# Patient Record
Sex: MALE | Race: WHITE | NOT HISPANIC OR LATINO
[De-identification: names, ages, dates, MRNs, and addresses within clinical notes are randomized per-mention and may not be internally consistent; named-entity substitution may affect disease eponyms.]

---

## 2017-01-11 ENCOUNTER — NON-APPOINTMENT (OUTPATIENT)
Age: 62
End: 2017-01-11

## 2017-01-11 ENCOUNTER — APPOINTMENT (OUTPATIENT)
Dept: ELECTROPHYSIOLOGY | Facility: CLINIC | Age: 62
End: 2017-01-11

## 2017-01-11 VITALS
DIASTOLIC BLOOD PRESSURE: 77 MMHG | BODY MASS INDEX: 31.15 KG/M2 | HEART RATE: 79 BPM | OXYGEN SATURATION: 99 % | SYSTOLIC BLOOD PRESSURE: 128 MMHG | WEIGHT: 230 LBS | HEIGHT: 72 IN

## 2017-04-26 ENCOUNTER — APPOINTMENT (OUTPATIENT)
Dept: ELECTROPHYSIOLOGY | Facility: CLINIC | Age: 62
End: 2017-04-26

## 2017-04-26 ENCOUNTER — NON-APPOINTMENT (OUTPATIENT)
Age: 62
End: 2017-04-26

## 2017-04-26 VITALS
BODY MASS INDEX: 31.15 KG/M2 | WEIGHT: 230 LBS | DIASTOLIC BLOOD PRESSURE: 85 MMHG | HEART RATE: 91 BPM | HEIGHT: 72 IN | OXYGEN SATURATION: 97 % | SYSTOLIC BLOOD PRESSURE: 133 MMHG

## 2017-04-26 RX ORDER — INSULIN GLARGINE 100 [IU]/ML
INJECTION, SOLUTION SUBCUTANEOUS
Refills: 0 | Status: ACTIVE | COMMUNITY

## 2017-08-02 ENCOUNTER — APPOINTMENT (OUTPATIENT)
Dept: ELECTROPHYSIOLOGY | Facility: CLINIC | Age: 62
End: 2017-08-02
Payer: MEDICARE

## 2017-08-02 ENCOUNTER — NON-APPOINTMENT (OUTPATIENT)
Age: 62
End: 2017-08-02

## 2017-08-02 VITALS
BODY MASS INDEX: 31.15 KG/M2 | HEIGHT: 72 IN | WEIGHT: 230 LBS | HEART RATE: 99 BPM | SYSTOLIC BLOOD PRESSURE: 118 MMHG | OXYGEN SATURATION: 98 % | DIASTOLIC BLOOD PRESSURE: 78 MMHG

## 2017-08-02 PROCEDURE — 93280 PM DEVICE PROGR EVAL DUAL: CPT

## 2017-08-22 ENCOUNTER — EMERGENCY (EMERGENCY)
Facility: HOSPITAL | Age: 62
LOS: 1 days | Discharge: HOME | End: 2017-08-22
Attending: EMERGENCY MEDICINE | Admitting: EMERGENCY MEDICINE
Payer: MEDICARE

## 2017-08-22 VITALS
DIASTOLIC BLOOD PRESSURE: 66 MMHG | HEART RATE: 100 BPM | RESPIRATION RATE: 16 BRPM | SYSTOLIC BLOOD PRESSURE: 156 MMHG | OXYGEN SATURATION: 100 % | TEMPERATURE: 97 F

## 2017-08-22 VITALS
HEART RATE: 85 BPM | RESPIRATION RATE: 16 BRPM | OXYGEN SATURATION: 99 % | SYSTOLIC BLOOD PRESSURE: 124 MMHG | DIASTOLIC BLOOD PRESSURE: 62 MMHG

## 2017-08-22 LAB
ALBUMIN SERPL ELPH-MCNC: 4.1 G/DL — SIGNIFICANT CHANGE UP (ref 3.3–5)
ALP SERPL-CCNC: 80 U/L — SIGNIFICANT CHANGE UP (ref 40–120)
ALT FLD-CCNC: 23 U/L — SIGNIFICANT CHANGE UP (ref 4–41)
APTT BLD: 31.4 SEC — SIGNIFICANT CHANGE UP (ref 27.5–37.4)
AST SERPL-CCNC: 30 U/L — SIGNIFICANT CHANGE UP (ref 4–40)
BASE EXCESS BLDV CALC-SCNC: -0.9 MMOL/L — SIGNIFICANT CHANGE UP
BASOPHILS # BLD AUTO: 0.03 K/UL — SIGNIFICANT CHANGE UP (ref 0–0.2)
BASOPHILS NFR BLD AUTO: 0.3 % — SIGNIFICANT CHANGE UP (ref 0–2)
BILIRUB SERPL-MCNC: 0.2 MG/DL — SIGNIFICANT CHANGE UP (ref 0.2–1.2)
BLOOD GAS VENOUS - CREATININE: 0.96 MG/DL — SIGNIFICANT CHANGE UP (ref 0.5–1.3)
BUN SERPL-MCNC: 21 MG/DL — SIGNIFICANT CHANGE UP (ref 7–23)
CALCIUM SERPL-MCNC: 8.7 MG/DL — SIGNIFICANT CHANGE UP (ref 8.4–10.5)
CHLORIDE BLDV-SCNC: 105 MMOL/L — SIGNIFICANT CHANGE UP (ref 96–108)
CHLORIDE SERPL-SCNC: 102 MMOL/L — SIGNIFICANT CHANGE UP (ref 98–107)
CK MB BLD-MCNC: 1.98 NG/ML — SIGNIFICANT CHANGE UP (ref 1–6.6)
CK MB BLD-MCNC: 2.24 NG/ML — SIGNIFICANT CHANGE UP (ref 1–6.6)
CK SERPL-CCNC: 61 U/L — SIGNIFICANT CHANGE UP (ref 30–200)
CK SERPL-CCNC: 68 U/L — SIGNIFICANT CHANGE UP (ref 30–200)
CO2 SERPL-SCNC: 23 MMOL/L — SIGNIFICANT CHANGE UP (ref 22–31)
CREAT SERPL-MCNC: 1.09 MG/DL — SIGNIFICANT CHANGE UP (ref 0.5–1.3)
EOSINOPHIL # BLD AUTO: 0.18 K/UL — SIGNIFICANT CHANGE UP (ref 0–0.5)
EOSINOPHIL NFR BLD AUTO: 1.6 % — SIGNIFICANT CHANGE UP (ref 0–6)
GAS PNL BLDV: 137 MMOL/L — SIGNIFICANT CHANGE UP (ref 136–146)
GLUCOSE BLDV-MCNC: 264 — HIGH (ref 70–99)
GLUCOSE SERPL-MCNC: 248 MG/DL — HIGH (ref 70–99)
HCO3 BLDV-SCNC: 23 MMOL/L — SIGNIFICANT CHANGE UP (ref 20–27)
HCT VFR BLD CALC: 36.2 % — LOW (ref 39–50)
HCT VFR BLDV CALC: 37 % — LOW (ref 39–51)
HGB BLD-MCNC: 11.9 G/DL — LOW (ref 13–17)
HGB BLDV-MCNC: 12 G/DL — LOW (ref 13–17)
IMM GRANULOCYTES # BLD AUTO: 0.1 # — SIGNIFICANT CHANGE UP
IMM GRANULOCYTES NFR BLD AUTO: 0.9 % — SIGNIFICANT CHANGE UP (ref 0–1.5)
INR BLD: 1 — SIGNIFICANT CHANGE UP (ref 0.88–1.17)
LACTATE BLDV-MCNC: 2.9 MMOL/L — HIGH (ref 0.5–2)
LYMPHOCYTES # BLD AUTO: 1.1 K/UL — SIGNIFICANT CHANGE UP (ref 1–3.3)
LYMPHOCYTES # BLD AUTO: 10 % — LOW (ref 13–44)
MCHC RBC-ENTMCNC: 28.4 PG — SIGNIFICANT CHANGE UP (ref 27–34)
MCHC RBC-ENTMCNC: 32.9 % — SIGNIFICANT CHANGE UP (ref 32–36)
MCV RBC AUTO: 86.4 FL — SIGNIFICANT CHANGE UP (ref 80–100)
MONOCYTES # BLD AUTO: 0.86 K/UL — SIGNIFICANT CHANGE UP (ref 0–0.9)
MONOCYTES NFR BLD AUTO: 7.8 % — SIGNIFICANT CHANGE UP (ref 2–14)
NEUTROPHILS # BLD AUTO: 8.69 K/UL — HIGH (ref 1.8–7.4)
NEUTROPHILS NFR BLD AUTO: 79.4 % — HIGH (ref 43–77)
NRBC # FLD: 0 — SIGNIFICANT CHANGE UP
PCO2 BLDV: 42 MMHG — SIGNIFICANT CHANGE UP (ref 41–51)
PH BLDV: 7.37 PH — SIGNIFICANT CHANGE UP (ref 7.32–7.43)
PLATELET # BLD AUTO: 255 K/UL — SIGNIFICANT CHANGE UP (ref 150–400)
PMV BLD: 10.5 FL — SIGNIFICANT CHANGE UP (ref 7–13)
PO2 BLDV: 44 MMHG — HIGH (ref 35–40)
POTASSIUM BLDV-SCNC: 4.5 MMOL/L — SIGNIFICANT CHANGE UP (ref 3.4–4.5)
POTASSIUM SERPL-MCNC: 4.8 MMOL/L — SIGNIFICANT CHANGE UP (ref 3.5–5.3)
POTASSIUM SERPL-SCNC: 4.8 MMOL/L — SIGNIFICANT CHANGE UP (ref 3.5–5.3)
PROT SERPL-MCNC: 7.5 G/DL — SIGNIFICANT CHANGE UP (ref 6–8.3)
PROTHROM AB SERPL-ACNC: 11.2 SEC — SIGNIFICANT CHANGE UP (ref 9.8–13.1)
RBC # BLD: 4.19 M/UL — LOW (ref 4.2–5.8)
RBC # FLD: 13.2 % — SIGNIFICANT CHANGE UP (ref 10.3–14.5)
SAO2 % BLDV: 75.6 % — SIGNIFICANT CHANGE UP (ref 60–85)
SODIUM SERPL-SCNC: 139 MMOL/L — SIGNIFICANT CHANGE UP (ref 135–145)
TROPONIN T SERPL-MCNC: < 0.06 NG/ML — SIGNIFICANT CHANGE UP (ref 0–0.06)
TROPONIN T SERPL-MCNC: < 0.06 NG/ML — SIGNIFICANT CHANGE UP (ref 0–0.06)
WBC # BLD: 10.96 K/UL — HIGH (ref 3.8–10.5)
WBC # FLD AUTO: 10.96 K/UL — HIGH (ref 3.8–10.5)

## 2017-08-22 PROCEDURE — 93010 ELECTROCARDIOGRAM REPORT: CPT | Mod: 76

## 2017-08-22 PROCEDURE — 71020: CPT | Mod: 26

## 2017-08-22 PROCEDURE — 99284 EMERGENCY DEPT VISIT MOD MDM: CPT | Mod: 25,GC

## 2017-08-22 RX ORDER — IPRATROPIUM/ALBUTEROL SULFATE 18-103MCG
3 AEROSOL WITH ADAPTER (GRAM) INHALATION ONCE
Qty: 0 | Refills: 0 | Status: COMPLETED | OUTPATIENT
Start: 2017-08-22 | End: 2017-08-22

## 2017-08-22 RX ADMIN — Medication 3 MILLILITER(S): at 09:05

## 2017-08-22 NOTE — ED PROVIDER NOTE - MEDICAL DECISION MAKING DETAILS
61 yo presenting with difficulty breathing that woke him up caused by mucous congestion. , afebrile, no respiratory distress on exam, some baseline anxiety. CXR, cardiac enzymes, and EKG pending. Will reassess.

## 2017-08-22 NOTE — ED PROVIDER NOTE - PMH
Arrhythmia as indication for cardiac pacemaker replacement    Depression    Diabetes mellitus type 2 in obese    Diabetes mellitus type II, controlled, with no complications    Dyslipidemia    HLD (hyperlipidemia)    HTN (hypertension)    HTN (hypertension)

## 2017-08-22 NOTE — ED PROVIDER NOTE - CARE PLAN
Instructions for follow-up, activity and diet:	1. You were seen for shortness of breath caused my mucous. Your chest X-ray showed no signs of pneumonia. Your EKG and cardiac markers were normal.  2. Take over the counter mucinex for your cough as prescribed. Try an over the counter Neti Pot for your congestion. Drink plenty of fluids and rest.   3. Follow up with your primary care doctor in 1-2 days for further workup of your congestion and cough.  4. Return to the emergency department for any shortness of breath, wheezing, chest pain, fever, bleeding, or fainting. Principal Discharge DX:	Bronchitis  Instructions for follow-up, activity and diet:	1. You were seen for shortness of breath caused my mucous. Your chest X-ray showed no signs of pneumonia. Your EKG and cardiac markers were normal.  2. Take over the counter mucinex for your cough as prescribed. Try an over the counter Neti Pot for your congestion. Drink plenty of fluids and rest.   3. Follow up with your primary care doctor in 1-2 days for further workup of your congestion and cough.  4. Return to the emergency department for any shortness of breath, wheezing, chest pain, fever, bleeding, or fainting.

## 2017-08-22 NOTE — ED PROVIDER NOTE - PROGRESS NOTE DETAILS
62 YOM PM arrhythmia NOS, pacemaker, former smoker, HTN, HLD, followed by cardiologist Dr. Champion p/w sudden onset SOB last night in the context of productive cough for 4 days.  Associated chest tightness.  Sx resolved with cough in two minutes.  In the ED Pt denies CP.  In the ED HR high 90's.  VS otherwise WNL.  Physical Exam: General: alert and oriented x3 in no acute distress.  Cardiovascular: Regular rate and rhythm, S1 and S2 normal.  No murmurs, rubs, or gallops.  No peripheral edema or JVD.  Respiratory: Bilateral rhonchi, mild wheezing diffusely.  Abdominal: Non-distended, normal bowel sounds in all four quadrants, non tender to percussion and palpation in all four quadrants.  No mass, rigidity, or guarding.  Extremities: No sign of trauma, inflammation, or effusion.  Full range of motion in the cervical, lumbar, and thoracic spine and all four extremities without limitation.  No peripheral edema.  Neurological: AOx3 NAD.  Cranial nerves I-XII intact.  Normal gross motor and sensory function. Pt ambulatory. 5/5 muscular strength with normal bulk and tone.  DDX: Bronchitis, PNA, IPF, primary pulmonary process.  Given Hx R/O ACS/Arrhythmia.  Treat symptomatically for SOB.  Reassess. Spoke with PCP on phone. PCP faxed pt's medical records to [facility]:  1. stress test 4/28/16: no evidence of stress-induced myocardial ischemia, LVEF: 30%  2. EKG on 3/10/16: ventricularly paced EKG CXR, and cardiac enzymes and EKG x 2 are normal. Pt breathing comfortably. Will discharge pt with instructions for supportive care for bronchitis and have him follow up with his PCP for further workup for persistent cough.

## 2017-08-22 NOTE — ED ADULT NURSE NOTE - OBJECTIVE STATEMENT
Patient received in room 8. Pt. is A&O x3 and ambulatory at baseline. Pt. c/o congestion and productive cough that began "about a month ago." Pt. states he woke up and had difficulty breathing, drank some water which helped. Pt. states the mucous was brown and clear. Respirations are even and unlabored on room air. VS as noted. MD Remy at bedside. Will continue to monitor.

## 2017-08-22 NOTE — ED ADULT TRIAGE NOTE - CHIEF COMPLAINT QUOTE
Pt. has been congested x 1 month.  this morning he felt as though he could not expel mucus and had some difficulty breathing. noted with some expiratory wheezes.

## 2017-08-22 NOTE — ED PROVIDER NOTE - OBJECTIVE STATEMENT
63 yo M presenting with difficulty breathing 2/2 congestion last night. Pt had pharyngitis for 1 month that resolved 1 week ago, a non-productive cough that started 3 weeks ago. A few nights ago pt started having congestion and last night pt had mucous building up that woke pt up with difficulty breathing. Mucous was white and brown and non-purulent. Pt produced 1/2 cup mucous last night. Pt drank water which helped decrease the mucous. No hx of sx.     PCP: Alesha Ng  Pharm: [id number] 61 yo M presenting with difficulty breathing 2/2 congestion last night. Pt had pharyngitis for 1 month that resolved 1 week ago, a non-productive cough that started 3 weeks ago. A few nights ago pt started having congestion and last night pt had mucous building up that woke pt up with difficulty breathing. Mucous was white and brown and non-purulent. Pt produced 1/2 cup mucous last night. Pt drank water which helped decrease the mucous. No hx of sx.     Meds: parotetine, lisinopril, gemfibrozil, januvia, metformin, glimepiride, asa 81    PCP: Alesha Ng  Pharm: [id number] [address]

## 2017-08-22 NOTE — ED PROVIDER NOTE - ATTENDING CONTRIBUTION TO CARE
ED Attending Dr. Robb: 61 yo male with PPM for unclear reasons, former smoker, in ED with 3 weeks of nonproductive cough, now with mucus production since last night.  Associated with vague intermittent chest pressure.  Pt feels improved after coughing up mucus.  At time of my exam there is no CP/SOB, N/V/D or abdominal pain.  On exam pt well appearing, in NAD, heart RRR, lungs scant wheezing and rhonchi throughout, abd NTND, extremities without swelling, strength 5/5 in all extremities and skin without rash.

## 2017-08-22 NOTE — ED PROVIDER NOTE - PLAN OF CARE
1. You were seen for shortness of breath caused my mucous. Your chest X-ray showed no signs of pneumonia. Your EKG and cardiac markers were normal.  2. Take over the counter mucinex for your cough as prescribed. Try an over the counter Neti Pot for your congestion. Drink plenty of fluids and rest.   3. Follow up with your primary care doctor in 1-2 days for further workup of your congestion and cough.  4. Return to the emergency department for any shortness of breath, wheezing, chest pain, fever, bleeding, or fainting.

## 2017-08-22 NOTE — ED PROVIDER NOTE - ENMT, MLM
Airway patent, Nasal mucosa clear. Mouth with normal mucosa. Throat has no vesicles, no oropharyngeal exudates

## 2017-12-06 ENCOUNTER — NON-APPOINTMENT (OUTPATIENT)
Age: 62
End: 2017-12-06

## 2017-12-06 ENCOUNTER — APPOINTMENT (OUTPATIENT)
Dept: ELECTROPHYSIOLOGY | Facility: CLINIC | Age: 62
End: 2017-12-06
Payer: MEDICARE

## 2017-12-06 VITALS — SYSTOLIC BLOOD PRESSURE: 119 MMHG | HEART RATE: 94 BPM | DIASTOLIC BLOOD PRESSURE: 74 MMHG | OXYGEN SATURATION: 95 %

## 2017-12-06 PROCEDURE — 93280 PM DEVICE PROGR EVAL DUAL: CPT

## 2018-01-04 ENCOUNTER — OTHER (OUTPATIENT)
Age: 63
End: 2018-01-04

## 2018-01-04 DIAGNOSIS — I47.2 VENTRICULAR TACHYCARDIA: ICD-10-CM

## 2018-01-23 ENCOUNTER — APPOINTMENT (OUTPATIENT)
Dept: CV DIAGNOSTICS | Facility: HOSPITAL | Age: 63
End: 2018-01-23

## 2018-02-08 ENCOUNTER — APPOINTMENT (OUTPATIENT)
Dept: ELECTROPHYSIOLOGY | Facility: CLINIC | Age: 63
End: 2018-02-08

## 2018-04-04 ENCOUNTER — APPOINTMENT (OUTPATIENT)
Dept: ELECTROPHYSIOLOGY | Facility: CLINIC | Age: 63
End: 2018-04-04
Payer: MEDICARE

## 2018-04-04 VITALS — HEART RATE: 71 BPM | SYSTOLIC BLOOD PRESSURE: 120 MMHG | DIASTOLIC BLOOD PRESSURE: 71 MMHG

## 2018-04-04 PROCEDURE — 93280 PM DEVICE PROGR EVAL DUAL: CPT

## 2018-04-04 RX ORDER — ASPIRIN 81 MG/1
81 TABLET ORAL
Refills: 0 | Status: ACTIVE | COMMUNITY

## 2018-08-07 ENCOUNTER — APPOINTMENT (OUTPATIENT)
Dept: ELECTROPHYSIOLOGY | Facility: CLINIC | Age: 63
End: 2018-08-07

## 2018-08-17 PROBLEM — I49.9 CARDIAC ARRHYTHMIA, UNSPECIFIED: Chronic | Status: ACTIVE | Noted: 2017-08-22

## 2018-08-21 ENCOUNTER — APPOINTMENT (OUTPATIENT)
Dept: ELECTROPHYSIOLOGY | Facility: CLINIC | Age: 63
End: 2018-08-21
Payer: MEDICARE

## 2018-08-21 VITALS
HEIGHT: 72 IN | WEIGHT: 230 LBS | HEART RATE: 99 BPM | DIASTOLIC BLOOD PRESSURE: 84 MMHG | BODY MASS INDEX: 31.15 KG/M2 | OXYGEN SATURATION: 97 % | SYSTOLIC BLOOD PRESSURE: 128 MMHG

## 2018-08-21 PROCEDURE — 93280 PM DEVICE PROGR EVAL DUAL: CPT

## 2018-11-13 ENCOUNTER — NON-APPOINTMENT (OUTPATIENT)
Age: 63
End: 2018-11-13

## 2018-11-13 ENCOUNTER — APPOINTMENT (OUTPATIENT)
Dept: ELECTROPHYSIOLOGY | Facility: CLINIC | Age: 63
End: 2018-11-13
Payer: MEDICARE

## 2018-11-13 VITALS
OXYGEN SATURATION: 97 % | HEART RATE: 60 BPM | WEIGHT: 230 LBS | DIASTOLIC BLOOD PRESSURE: 82 MMHG | HEIGHT: 72 IN | BODY MASS INDEX: 31.15 KG/M2 | SYSTOLIC BLOOD PRESSURE: 156 MMHG

## 2018-11-13 PROCEDURE — 93280 PM DEVICE PROGR EVAL DUAL: CPT

## 2018-11-13 PROCEDURE — 99214 OFFICE O/P EST MOD 30 MIN: CPT

## 2018-11-13 NOTE — DISCUSSION/SUMMARY
[FreeTextEntry1] : Normal device function with adequate safety margins.  Since his mode switch (9/21/2018) he has been 99%  at 60 bpm.  Given his symptoms of shortness of breath I suggested that we add rate response.  We also discussed that persistent atrial arrhythmia can cause these symptoms and that his ECG is c/w typical CTI dependant flutter, a rhythm amenable to ablation with a low risk and high likelihood for success.  Of course this would not address his PAF and thus he should/would remain on oral AC.  I stressed the importance of this and counseled that if finances are a concern he should consider Coumadin.  He will d/w his PMD.  He declines EPS/Flutter ablation, again out of concern for cost.  He will follow up in 2 months.

## 2018-11-13 NOTE — HISTORY OF PRESENT ILLNESS
[de-identified] : He is back on Eliquis.  He is concerned about long term therapy given the cost. No palpitations. No chest pain. He has noted some shortness of breath on exertion over the past few months.  It is not severe.  No lightheadedness/dizziness.

## 2018-11-13 NOTE — PHYSICAL EXAM
[General Appearance - Well Developed] : well developed [Normal Appearance] : normal appearance [Well Groomed] : well groomed [General Appearance - Well Nourished] : well nourished [No Deformities] : no deformities [General Appearance - In No Acute Distress] : no acute distress [Heart Rate And Rhythm] : heart rate and rhythm were normal [Heart Sounds] : normal S1 and S2 [Systolic grade ___/6] : A grade [unfilled]/6 systolic murmur was heard. [Respiration, Rhythm And Depth] : normal respiratory rhythm and effort [Exaggerated Use Of Accessory Muscles For Inspiration] : no accessory muscle use [Auscultation Breath Sounds / Voice Sounds] : lungs were clear to auscultation bilaterally [Left Infraclavicular] : left infraclavicular area [Clean] : clean [Dry] : dry [Well-Healed] : well-healed [Abdomen Soft] : soft [Abdomen Tenderness] : non-tender [Abdomen Mass (___ Cm)] : no abdominal mass palpated [Nail Clubbing] : no clubbing of the fingernails [Cyanosis, Localized] : no localized cyanosis [Petechial Hemorrhages (___cm)] : no petechial hemorrhages [] : no ischemic changes

## 2018-11-13 NOTE — PROCEDURE
[Pacemaker] : pacemaker [Threshold Testing Performed] : Threshold testing was performed [Lead Imp:  ___ohms] : lead impedance was [unfilled] ohms [Sensing Amplitude ___mv] : sensing amplitude was [unfilled] mv [___V @] : [unfilled] V [___ ms] : [unfilled] ms [de-identified] : [employer]  [de-identified] : [employer] [de-identified] : [id number] [de-identified] : 2/28/2013 [de-identified] : Persistent atrial arrhythmia since 9/21/2018

## 2019-03-05 ENCOUNTER — NON-APPOINTMENT (OUTPATIENT)
Age: 64
End: 2019-03-05

## 2019-03-05 ENCOUNTER — APPOINTMENT (OUTPATIENT)
Dept: ELECTROPHYSIOLOGY | Facility: CLINIC | Age: 64
End: 2019-03-05
Payer: MEDICARE

## 2019-03-05 VITALS
OXYGEN SATURATION: 97 % | DIASTOLIC BLOOD PRESSURE: 74 MMHG | WEIGHT: 230 LBS | SYSTOLIC BLOOD PRESSURE: 124 MMHG | HEIGHT: 72 IN | BODY MASS INDEX: 31.15 KG/M2 | HEART RATE: 63 BPM

## 2019-03-05 DIAGNOSIS — I48.92 UNSPECIFIED ATRIAL FLUTTER: ICD-10-CM

## 2019-03-05 PROCEDURE — 99213 OFFICE O/P EST LOW 20 MIN: CPT

## 2019-03-05 PROCEDURE — 93280 PM DEVICE PROGR EVAL DUAL: CPT

## 2019-03-05 RX ORDER — APIXABAN 5 MG/1
5 TABLET, FILM COATED ORAL
Qty: 60 | Refills: 5 | Status: ACTIVE | COMMUNITY
Start: 2018-11-13

## 2019-03-05 NOTE — DISCUSSION/SUMMARY
[FreeTextEntry1] : Normal device function with adequate safety margins.  Remains in persistent atrial flutter.  Will not try manual ATP as he is only taking the Eliquis 2x/day.  He was advised of the importance of this intervention.  We again discussed ablation.  He expresses concern over the cost of his healthcare, meds and procedures.  Follow up in 4 months.

## 2019-03-05 NOTE — PROCEDURE
[Pacemaker] : pacemaker [Threshold Testing Performed] : Threshold testing was performed [Lead Imp:  ___ohms] : lead impedance was [unfilled] ohms [Sensing Amplitude ___mv] : sensing amplitude was [unfilled] mv [___V @] : [unfilled] V [___ ms] : [unfilled] ms [de-identified] : [employer]  [de-identified] : [employer] [de-identified] : [id number] [de-identified] : 2/28/2013 [de-identified] : Persistent atrial arrhythmia since 9/21/2018

## 2019-03-05 NOTE — HISTORY OF PRESENT ILLNESS
[de-identified] : He has noted better effort tolerance since changing adding rate response.  No palpitations. NO chest pain. No shortness of breath.

## 2019-04-20 ENCOUNTER — INPATIENT (INPATIENT)
Facility: HOSPITAL | Age: 64
LOS: 3 days | Discharge: HOME | DRG: 378 | End: 2019-04-24
Attending: STUDENT IN AN ORGANIZED HEALTH CARE EDUCATION/TRAINING PROGRAM | Admitting: STUDENT IN AN ORGANIZED HEALTH CARE EDUCATION/TRAINING PROGRAM
Payer: MEDICARE

## 2019-04-20 VITALS
TEMPERATURE: 98 F | RESPIRATION RATE: 18 BRPM | OXYGEN SATURATION: 98 % | SYSTOLIC BLOOD PRESSURE: 105 MMHG | HEART RATE: 60 BPM | DIASTOLIC BLOOD PRESSURE: 67 MMHG | WEIGHT: 229.06 LBS

## 2019-04-20 DIAGNOSIS — K92.2 GASTROINTESTINAL HEMORRHAGE, UNSPECIFIED: ICD-10-CM

## 2019-04-20 DIAGNOSIS — E11.9 TYPE 2 DIABETES MELLITUS WITHOUT COMPLICATIONS: ICD-10-CM

## 2019-04-20 DIAGNOSIS — D50.0 IRON DEFICIENCY ANEMIA SECONDARY TO BLOOD LOSS (CHRONIC): ICD-10-CM

## 2019-04-20 DIAGNOSIS — F41.9 ANXIETY DISORDER, UNSPECIFIED: ICD-10-CM

## 2019-04-20 DIAGNOSIS — Z29.9 ENCOUNTER FOR PROPHYLACTIC MEASURES, UNSPECIFIED: ICD-10-CM

## 2019-04-20 DIAGNOSIS — I10 ESSENTIAL (PRIMARY) HYPERTENSION: ICD-10-CM

## 2019-04-20 LAB
ALBUMIN SERPL ELPH-MCNC: 4.4 G/DL — SIGNIFICANT CHANGE UP (ref 3.3–5)
ALP SERPL-CCNC: 96 U/L — SIGNIFICANT CHANGE UP (ref 40–120)
ALT FLD-CCNC: 15 U/L — SIGNIFICANT CHANGE UP (ref 10–45)
ANION GAP SERPL CALC-SCNC: 17 MMOL/L — SIGNIFICANT CHANGE UP (ref 5–17)
APPEARANCE UR: CLEAR — SIGNIFICANT CHANGE UP
APTT BLD: 30.8 SEC — SIGNIFICANT CHANGE UP (ref 27.5–36.3)
AST SERPL-CCNC: 21 U/L — SIGNIFICANT CHANGE UP (ref 10–40)
BASOPHILS # BLD AUTO: 0.1 K/UL — SIGNIFICANT CHANGE UP (ref 0–0.2)
BASOPHILS NFR BLD AUTO: 0.4 % — SIGNIFICANT CHANGE UP (ref 0–2)
BILIRUB SERPL-MCNC: 0.2 MG/DL — SIGNIFICANT CHANGE UP (ref 0.2–1.2)
BILIRUB UR-MCNC: NEGATIVE — SIGNIFICANT CHANGE UP
BLD GP AB SCN SERPL QL: NEGATIVE — SIGNIFICANT CHANGE UP
BUN SERPL-MCNC: 43 MG/DL — HIGH (ref 7–23)
CALCIUM SERPL-MCNC: 9.5 MG/DL — SIGNIFICANT CHANGE UP (ref 8.4–10.5)
CHLORIDE SERPL-SCNC: 100 MMOL/L — SIGNIFICANT CHANGE UP (ref 96–108)
CO2 SERPL-SCNC: 20 MMOL/L — LOW (ref 22–31)
COLOR SPEC: SIGNIFICANT CHANGE UP
CREAT SERPL-MCNC: 1.35 MG/DL — HIGH (ref 0.5–1.3)
DIFF PNL FLD: NEGATIVE — SIGNIFICANT CHANGE UP
EOSINOPHIL # BLD AUTO: 0.1 K/UL — SIGNIFICANT CHANGE UP (ref 0–0.5)
EOSINOPHIL NFR BLD AUTO: 1 % — SIGNIFICANT CHANGE UP (ref 0–6)
GAS PNL BLDV: SIGNIFICANT CHANGE UP
GLUCOSE SERPL-MCNC: 254 MG/DL — HIGH (ref 70–99)
GLUCOSE UR QL: ABNORMAL
HCT VFR BLD CALC: 27.3 % — LOW (ref 39–50)
HGB BLD-MCNC: 9 G/DL — LOW (ref 13–17)
INR BLD: 1.24 RATIO — HIGH (ref 0.88–1.16)
KETONES UR-MCNC: NEGATIVE — SIGNIFICANT CHANGE UP
LEUKOCYTE ESTERASE UR-ACNC: NEGATIVE — SIGNIFICANT CHANGE UP
LYMPHOCYTES # BLD AUTO: 13.8 % — SIGNIFICANT CHANGE UP (ref 13–44)
LYMPHOCYTES # BLD AUTO: 2 K/UL — SIGNIFICANT CHANGE UP (ref 1–3.3)
MCHC RBC-ENTMCNC: 27.5 PG — SIGNIFICANT CHANGE UP (ref 27–34)
MCHC RBC-ENTMCNC: 32.9 GM/DL — SIGNIFICANT CHANGE UP (ref 32–36)
MCV RBC AUTO: 83.5 FL — SIGNIFICANT CHANGE UP (ref 80–100)
MONOCYTES # BLD AUTO: 1.3 K/UL — HIGH (ref 0–0.9)
MONOCYTES NFR BLD AUTO: 8.8 % — SIGNIFICANT CHANGE UP (ref 2–14)
NEUTROPHILS # BLD AUTO: 10.9 K/UL — HIGH (ref 1.8–7.4)
NEUTROPHILS NFR BLD AUTO: 75.9 % — SIGNIFICANT CHANGE UP (ref 43–77)
NITRITE UR-MCNC: NEGATIVE — SIGNIFICANT CHANGE UP
OB PNL STL: POSITIVE
PH UR: 6 — SIGNIFICANT CHANGE UP (ref 5–8)
PLATELET # BLD AUTO: 334 K/UL — SIGNIFICANT CHANGE UP (ref 150–400)
POTASSIUM SERPL-MCNC: 4.2 MMOL/L — SIGNIFICANT CHANGE UP (ref 3.5–5.3)
POTASSIUM SERPL-SCNC: 4.2 MMOL/L — SIGNIFICANT CHANGE UP (ref 3.5–5.3)
PROT SERPL-MCNC: 7.8 G/DL — SIGNIFICANT CHANGE UP (ref 6–8.3)
PROT UR-MCNC: NEGATIVE — SIGNIFICANT CHANGE UP
PROTHROM AB SERPL-ACNC: 14.3 SEC — HIGH (ref 10–12.9)
RBC # BLD: 3.27 M/UL — LOW (ref 4.2–5.8)
RBC # FLD: 13.2 % — SIGNIFICANT CHANGE UP (ref 10.3–14.5)
RH IG SCN BLD-IMP: POSITIVE — SIGNIFICANT CHANGE UP
SODIUM SERPL-SCNC: 137 MMOL/L — SIGNIFICANT CHANGE UP (ref 135–145)
SP GR SPEC: 1.02 — SIGNIFICANT CHANGE UP (ref 1.01–1.02)
UROBILINOGEN FLD QL: NEGATIVE — SIGNIFICANT CHANGE UP
WBC # BLD: 14.4 K/UL — HIGH (ref 3.8–10.5)
WBC # FLD AUTO: 14.4 K/UL — HIGH (ref 3.8–10.5)

## 2019-04-20 PROCEDURE — 99285 EMERGENCY DEPT VISIT HI MDM: CPT | Mod: GC

## 2019-04-20 PROCEDURE — 99223 1ST HOSP IP/OBS HIGH 75: CPT | Mod: GC

## 2019-04-20 RX ORDER — DEXTROSE 50 % IN WATER 50 %
25 SYRINGE (ML) INTRAVENOUS ONCE
Qty: 0 | Refills: 0 | Status: DISCONTINUED | OUTPATIENT
Start: 2019-04-20 | End: 2019-04-24

## 2019-04-20 RX ORDER — GLUCAGON INJECTION, SOLUTION 0.5 MG/.1ML
1 INJECTION, SOLUTION SUBCUTANEOUS ONCE
Qty: 0 | Refills: 0 | Status: DISCONTINUED | OUTPATIENT
Start: 2019-04-20 | End: 2019-04-24

## 2019-04-20 RX ORDER — DEXTROSE 50 % IN WATER 50 %
12.5 SYRINGE (ML) INTRAVENOUS ONCE
Qty: 0 | Refills: 0 | Status: DISCONTINUED | OUTPATIENT
Start: 2019-04-20 | End: 2019-04-24

## 2019-04-20 RX ORDER — PANTOPRAZOLE SODIUM 20 MG/1
40 TABLET, DELAYED RELEASE ORAL ONCE
Qty: 0 | Refills: 0 | Status: COMPLETED | OUTPATIENT
Start: 2019-04-20 | End: 2019-04-20

## 2019-04-20 RX ORDER — PANTOPRAZOLE SODIUM 20 MG/1
40 TABLET, DELAYED RELEASE ORAL EVERY 12 HOURS
Qty: 0 | Refills: 0 | Status: DISCONTINUED | OUTPATIENT
Start: 2019-04-20 | End: 2019-04-23

## 2019-04-20 RX ORDER — SODIUM CHLORIDE 9 MG/ML
1000 INJECTION, SOLUTION INTRAVENOUS ONCE
Qty: 0 | Refills: 0 | Status: COMPLETED | OUTPATIENT
Start: 2019-04-20 | End: 2019-04-20

## 2019-04-20 RX ORDER — INFLUENZA VIRUS VACCINE 15; 15; 15; 15 UG/.5ML; UG/.5ML; UG/.5ML; UG/.5ML
0.5 SUSPENSION INTRAMUSCULAR ONCE
Qty: 0 | Refills: 0 | Status: DISCONTINUED | OUTPATIENT
Start: 2019-04-20 | End: 2019-04-24

## 2019-04-20 RX ORDER — INSULIN LISPRO 100/ML
VIAL (ML) SUBCUTANEOUS AT BEDTIME
Qty: 0 | Refills: 0 | Status: DISCONTINUED | OUTPATIENT
Start: 2019-04-20 | End: 2019-04-24

## 2019-04-20 RX ORDER — INSULIN LISPRO 100/ML
VIAL (ML) SUBCUTANEOUS
Qty: 0 | Refills: 0 | Status: DISCONTINUED | OUTPATIENT
Start: 2019-04-20 | End: 2019-04-24

## 2019-04-20 RX ORDER — SODIUM CHLORIDE 9 MG/ML
1000 INJECTION, SOLUTION INTRAVENOUS
Qty: 0 | Refills: 0 | Status: DISCONTINUED | OUTPATIENT
Start: 2019-04-20 | End: 2019-04-24

## 2019-04-20 RX ORDER — DEXTROSE 50 % IN WATER 50 %
15 SYRINGE (ML) INTRAVENOUS ONCE
Qty: 0 | Refills: 0 | Status: DISCONTINUED | OUTPATIENT
Start: 2019-04-20 | End: 2019-04-24

## 2019-04-20 RX ORDER — METFORMIN HYDROCHLORIDE 850 MG/1
1 TABLET ORAL
Qty: 0 | Refills: 0 | COMMUNITY

## 2019-04-20 RX ORDER — INSULIN GLARGINE 100 [IU]/ML
30 INJECTION, SOLUTION SUBCUTANEOUS AT BEDTIME
Qty: 0 | Refills: 0 | Status: DISCONTINUED | OUTPATIENT
Start: 2019-04-20 | End: 2019-04-21

## 2019-04-20 RX ADMIN — INSULIN GLARGINE 30 UNIT(S): 100 INJECTION, SOLUTION SUBCUTANEOUS at 23:43

## 2019-04-20 RX ADMIN — SODIUM CHLORIDE 1000 MILLILITER(S): 9 INJECTION, SOLUTION INTRAVENOUS at 18:36

## 2019-04-20 RX ADMIN — PANTOPRAZOLE SODIUM 40 MILLIGRAM(S): 20 TABLET, DELAYED RELEASE ORAL at 23:50

## 2019-04-20 RX ADMIN — PANTOPRAZOLE SODIUM 40 MILLIGRAM(S): 20 TABLET, DELAYED RELEASE ORAL at 19:26

## 2019-04-20 RX ADMIN — Medication 2: at 23:44

## 2019-04-20 RX ADMIN — SODIUM CHLORIDE 1000 MILLILITER(S): 9 INJECTION, SOLUTION INTRAVENOUS at 19:45

## 2019-04-20 NOTE — ED PROVIDER NOTE - CLINICAL SUMMARY MEDICAL DECISION MAKING FREE TEXT BOX
64 Y M on Eliquis for valve stenosis for 5 months presents with dark stools x 1 month with possible gross blood/melanotic diarrhea yesterday. Will get fecal occult, type and screen, transfuse PRN.

## 2019-04-20 NOTE — H&P ADULT - NSHPPHYSICALEXAM_GEN_ALL_CORE
PHYSICAL EXAM:   GEN: Age appropriate, resting comfortably in bed, no acute distress, non toxic appearing, speaking in complete sentences.   HEENT: Conjunctival pallor  PULM: Lungs CTAB, no wheezes, rales, rhonchi  CV: RRR, S1S2. +Systolic murmur FANY radiating to carotids  Abdominal: Soft, nontender to palpation, non-distended, +BS  Extremities: No edema or cyanosis  NEURO: AAOx3  Skin: No rashes, lesions PHYSICAL EXAM:   GEN: Age appropriate, resting comfortably in bed, no acute distress, non toxic appearing, speaking in complete sentences.   HEENT: Conjunctival pallor  PULM: Lungs CTAB, no wheezes, rales, rhonchi  CV: RRR, S1S2. +Systolic murmur FANY radiating to carotids  Abdominal: Soft, nontender to palpation, non-distended, +BS  Extremities: No edema or cyanosis  NEURO: AAOx3  Skin: No rashes, lesions    Vital Signs Last 24 Hrs  T(C): 36.8 (20 Apr 2019 21:27), Max: 36.8 (20 Apr 2019 21:27)  T(F): 98.2 (20 Apr 2019 21:27), Max: 98.2 (20 Apr 2019 21:27)  HR: 60 (20 Apr 2019 21:27) (60 - 62)  BP: 125/65 (20 Apr 2019 21:27) (105/67 - 142/65)  BP(mean): --  RR: 18 (20 Apr 2019 21:27) (16 - 20)  SpO2: 96% (20 Apr 2019 21:27) (96% - 99%)

## 2019-04-20 NOTE — H&P ADULT - PROBLEM SELECTOR PLAN 2
-Hb 9 on CBC. Last Hb 2017 11.9 in [place]. No Hb seen on [employer]. -Hb 9 on CBC. Last Hb 2017 11.9 in [place]. No Hb seen on [employer].  -Trend cbc

## 2019-04-20 NOTE — H&P ADULT - NSHPSOCIALHISTORY_GEN_ALL_CORE
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   No drugs etoh tobacco use currently  Smoked 1.5 PPD for 20 years quit 2001

## 2019-04-20 NOTE — H&P ADULT - ATTENDING COMMENTS
I have reviewed the labs, imaging and ekg. I have edited the above note as appropriate and agree with above.

## 2019-04-20 NOTE — H&P ADULT - ASSESSMENT
The patient is a 65 yo F with a PMH of GERD, T2DM, HTN, anxiety, valvular stenosis (on apixaban), right arm fracture (2013, takes ibuprofen once per week prior to physical therapy), s/p PPM (2013), who presents to ED with 2 days of black, tarry stools and intermittent episodes nausea, dizziness, muscle weakness and pressure like sensation running from his abdomen up his central chest to his forehead found to have evidence of an upper GI bleed with quiac + stool. Differential includes PUD vs malignancy. The patient is a 63 yo F with a PMH of GERD, T2DM, HTN, anxiety, Atrial flutter (on apixaban), aortic stenosis, right arm fracture (2013, takes ibuprofen once per week prior to physical therapy), s/p PPM (2013, last interrogated 03/19), who presents to ED with 2 days of black, tarry stools and intermittent episodes nausea, dizziness, muscle weakness and pressure like sensation running from his abdomen up his central chest to his forehead found to have evidence of an upper GI bleed with quiac + stool. Differential includes PUD vs GERD/gastritis vs malignancy.

## 2019-04-20 NOTE — H&P ADULT - NSICDXPASTMEDICALHX_GEN_ALL_CORE_FT
PAST MEDICAL HISTORY:  Arrhythmia as indication for cardiac pacemaker replacement     Depression     Diabetes mellitus type 2 in obese     Diabetes mellitus type II, controlled, with no complications     Dyslipidemia     HLD (hyperlipidemia)     HTN (hypertension)     HTN (hypertension)

## 2019-04-20 NOTE — H&P ADULT - NSHPLABSRESULTS_GEN_ALL_CORE
CBC Full  -  ( 2019 17:08 )  WBC Count : 14.4 K/uL  RBC Count : 3.27 M/uL  Hemoglobin : 9.0 g/dL  Hematocrit : 27.3 %  Platelet Count - Automated : 334 K/uL  Mean Cell Volume : 83.5 fl  Mean Cell Hemoglobin : 27.5 pg  Mean Cell Hemoglobin Concentration : 32.9 gm/dL  Auto Neutrophil # : 10.9 K/uL  Auto Lymphocyte # : 2.0 K/uL  Auto Monocyte # : 1.3 K/uL  Auto Eosinophil # : 0.1 K/uL  Auto Basophil # : 0.1 K/uL  Auto Neutrophil % : 75.9 %  Auto Lymphocyte % : 13.8 %  Auto Monocyte % : 8.8 %  Auto Eosinophil % : 1.0 %  Auto Basophil % : 0.4 %        137  |  100  |  43<H>  ----------------------------<  254<H>  4.2   |  20<L>  |  1.35<H>    LIVER FUNCTIONS - ( 2019 17:08 )  Alb: 4.4 g/dL / Pro: 7.8 g/dL / ALK PHOS: 96 U/L / ALT: 15 U/L / AST: 21 U/L / GGT: x           PT/INR - ( 2019 17:08 )   PT: 14.3 sec;   INR: 1.24 ratio         PTT - ( 2019 17:08 )  PTT:30.8 sec    Urinalysis Basic - ( 2019 17:17 )    Color: Light Yellow / Appearance: Clear / S.025 / pH: x  Gluc: x / Ketone: Negative  / Bili: Negative / Urobili: Negative   Blood: x / Protein: Negative / Nitrite: Negative   Leuk Esterase: Negative / RBC: x / WBC x   Sq Epi: x / Non Sq Epi: x / Bacteria: x    17:07 - VBG - pH: 7.35  | pCO2: 42    | pO2: 32    | Lactate: 2.5 CBC Full  -  ( 2019 17:08 )  WBC Count : 14.4 K/uL  RBC Count : 3.27 M/uL  Hemoglobin : 9.0 g/dL  Hematocrit : 27.3 %  Platelet Count - Automated : 334 K/uL  Mean Cell Volume : 83.5 fl  Mean Cell Hemoglobin : 27.5 pg  Mean Cell Hemoglobin Concentration : 32.9 gm/dL  Auto Neutrophil # : 10.9 K/uL  Auto Lymphocyte # : 2.0 K/uL  Auto Monocyte # : 1.3 K/uL  Auto Eosinophil # : 0.1 K/uL  Auto Basophil # : 0.1 K/uL  Auto Neutrophil % : 75.9 %  Auto Lymphocyte % : 13.8 %  Auto Monocyte % : 8.8 %  Auto Eosinophil % : 1.0 %  Auto Basophil % : 0.4 %        137  |  100  |  43<H>  ----------------------------<  254<H>  4.2   |  20<L>  |  1.35<H>    LIVER FUNCTIONS - ( 2019 17:08 )  Alb: 4.4 g/dL / Pro: 7.8 g/dL / ALK PHOS: 96 U/L / ALT: 15 U/L / AST: 21 U/L / GGT: x           PT/INR - ( 2019 17:08 )   PT: 14.3 sec;   INR: 1.24 ratio         PTT - ( 2019 17:08 )  PTT:30.8 sec    Urinalysis Basic - ( 2019 17:17 )    Color: Light Yellow / Appearance: Clear / S.025 / pH: x  Gluc: x / Ketone: Negative  / Bili: Negative / Urobili: Negative   Blood: x / Protein: Negative / Nitrite: Negative   Leuk Esterase: Negative / RBC: x / WBC x   Sq Epi: x / Non Sq Epi: x / Bacteria: x    17:07 - VBG - pH: 7.35  | pCO2: 42    | pO2: 32    | Lactate: 2.5    EKG reviewed by me:  Rate: 60  Rhythm: a flutter  Axis: normal axis  LA: n/a  QRS: 140  QTC: 470  No Radha/STd/TWI/LVH CBC Full  -  ( 2019 17:08 )  WBC Count : 14.4 K/uL  RBC Count : 3.27 M/uL  Hemoglobin : 9.0 g/dL  Hematocrit : 27.3 %  Platelet Count - Automated : 334 K/uL  Mean Cell Volume : 83.5 fl  Mean Cell Hemoglobin : 27.5 pg  Mean Cell Hemoglobin Concentration : 32.9 gm/dL  Auto Neutrophil # : 10.9 K/uL  Auto Lymphocyte # : 2.0 K/uL  Auto Monocyte # : 1.3 K/uL  Auto Eosinophil # : 0.1 K/uL  Auto Basophil # : 0.1 K/uL  Auto Neutrophil % : 75.9 %  Auto Lymphocyte % : 13.8 %  Auto Monocyte % : 8.8 %  Auto Eosinophil % : 1.0 %  Auto Basophil % : 0.4 %    04    137  |  100  |  43<H>  ----------------------------<  254<H>  4.2   |  20<L>  |  1.35<H>    LIVER FUNCTIONS - ( 2019 17:08 )  Alb: 4.4 g/dL / Pro: 7.8 g/dL / ALK PHOS: 96 U/L / ALT: 15 U/L / AST: 21 U/L / GGT: x           PT/INR - ( 2019 17:08 )   PT: 14.3 sec;   INR: 1.24 ratio         PTT - ( 2019 17:08 )  PTT:30.8 sec    Urinalysis Basic - ( 2019 17:17 )    Color: Light Yellow / Appearance: Clear / S.025 / pH: x  Gluc: x / Ketone: Negative  / Bili: Negative / Urobili: Negative   Blood: x / Protein: Negative / Nitrite: Negative   Leuk Esterase: Negative / RBC: x / WBC x   Sq Epi: x / Non Sq Epi: x / Bacteria: x    17:07 - VBG - pH: 7.35  | pCO2: 42    | pO2: 32    | Lactate: 2.5    EKG reviewed by me:  Rate: 60  Rhythm: a flutter  Axis: normal axis  LA: n/a  QRS: 140  QTC: 470  No Radha/STd/TWI/LVH     quiac + CBC Full  -  ( 2019 17:08 )  WBC Count : 14.4 K/uL  RBC Count : 3.27 M/uL  Hemoglobin : 9.0 g/dL  Hematocrit : 27.3 %  Platelet Count - Automated : 334 K/uL  Mean Cell Volume : 83.5 fl  Mean Cell Hemoglobin : 27.5 pg  Mean Cell Hemoglobin Concentration : 32.9 gm/dL  Auto Neutrophil # : 10.9 K/uL  Auto Lymphocyte # : 2.0 K/uL  Auto Monocyte # : 1.3 K/uL  Auto Eosinophil # : 0.1 K/uL  Auto Basophil # : 0.1 K/uL  Auto Neutrophil % : 75.9 %  Auto Lymphocyte % : 13.8 %  Auto Monocyte % : 8.8 %  Auto Eosinophil % : 1.0 %  Auto Basophil % : 0.4 %    04    137  |  100  |  43<H>  ----------------------------<  254<H>  4.2   |  20<L>  |  1.35<H>    LIVER FUNCTIONS - ( 2019 17:08 )  Alb: 4.4 g/dL / Pro: 7.8 g/dL / ALK PHOS: 96 U/L / ALT: 15 U/L / AST: 21 U/L / GGT: x           PT/INR - ( 2019 17:08 )   PT: 14.3 sec;   INR: 1.24 ratio      PTT - ( 2019 17:08 )  PTT:30.8 sec    Urinalysis Basic - ( 2019 17:17 )    Color: Light Yellow / Appearance: Clear / S.025 / pH: x  Gluc: x / Ketone: Negative  / Bili: Negative / Urobili: Negative   Blood: x / Protein: Negative / Nitrite: Negative   Leuk Esterase: Negative / RBC: x / WBC x   Sq Epi: x / Non Sq Epi: x / Bacteria: x    17:07 - VBG - pH: 7.35  | pCO2: 42    | pO2: 32    | Lactate: 2.5    EKG reviewed by me:  Rate: 60  Rhythm: a flutter  Axis: normal axis  MS: n/a  QRS: 140  QTC: 470  No Radha/STd/TWI/LVH     quiac +    I have reviewed the labs, imaging and ekg.

## 2019-04-20 NOTE — H&P ADULT - PROBLEM SELECTOR PLAN 7
-DVT prophylaxis with SCDs (on apixaban at home)  -Regular diet DASH / TLC  -CODE STATUS: Full code per discussion with patient and wife at bedside in ED

## 2019-04-20 NOTE — ED ADULT NURSE NOTE - NSIMPLEMENTINTERV_GEN_ALL_ED
Implemented All Universal Safety Interventions:  [place] to call system. Call bell, personal items and telephone within reach. Instruct patient to call for assistance. Room bathroom lighting operational. Non-slip footwear when patient is off stretcher. Physically safe environment: no spills, clutter or unnecessary equipment. Stretcher in lowest position, wheels locked, appropriate side rails in place.

## 2019-04-20 NOTE — ED PROVIDER NOTE - OBJECTIVE STATEMENT
64 Y M on Eliquis for valvular stenosis, PPM, DM on insulin, HTN, says that he has been having diarrhea for the last few days that was dark and then yesterday it was black, he says that he did take some pepto but it was after he noticed the stool was black. He has only been on the Eliquis for 5 months, he thinks that he first noticed dark stools a month ago. He thinks that the diarrhea he had yesterday was black and mixed with blood. He admits to weakness, dizziness and SOB. Pt has never had a colonoscopy or EGD.

## 2019-04-20 NOTE — H&P ADULT - PROBLEM SELECTOR PLAN 3
-On insulin 30 QHS, metformin, Empagliflozin (jardiance), glimipiride as outpt   -C/w insulin 30 QHS, moderate ISS. A1c AM. Unclear why patient is not on statin. -On insulin 30 QHS, metformin, Empagliflozin (jardiance), glimipiride as outpt. Will hold PO diabetes meds for now.   -C/w insulin 30 QHS, moderate ISS. A1c AM. Unclear why patient is not on statin.

## 2019-04-20 NOTE — H&P ADULT - PROBLEM SELECTOR PLAN 4
-hold home anti-HTN meds in setting of normal BP and UGIB -Hx of A flutter on apixaban at home  -Apixaban on hold for GI bleed.

## 2019-04-20 NOTE — ED ADULT NURSE NOTE - OBJECTIVE STATEMENT
Patient presents to Ed with c/o dark stool. Patient on Eliquis for vascular stenosis reports experiencing diarrhea since Thursday, loose stool  yesterday and normal stool today dark an blood tinged. Patient A&Ox3 denies chest pain reports sob no nausea vomiting fever chills headache    reports dizziness and  weakness no le swelling. LFA 20g iv lock place, labs drawn and sent.

## 2019-04-20 NOTE — H&P ADULT - PROBLEM SELECTOR PLAN 6
-DVT prophylaxis with SCDs (on apixaban at home)  -Regular diet DASH / TLC  -CODE STATUS: Full code per discussion with patient and wife at bedside in ED -c/w home paroxetine 40

## 2019-04-20 NOTE — ED PROVIDER NOTE - ATTENDING CONTRIBUTION TO CARE
MD Inman:  patient seen and evaluated with the resident.  I was present for key portions of the History & Physical, and I agree with the Impression & Plan.  MD Inman:  63 yo M, c/o dark stools on DOAC.  Patient has MHx of aortic stenosis; known 3/6 systolic murmur, MHx aflutter with [employer] V-pacer; being followed by Dr. Woodall.    Patient was put on Eliquis because of "the valve and the pacemaker."  ECG= v-paced, with underlying atrial flutter.    Hemoccult positive.   Impression:  DDx includes symptomatic GIB, likely exacerbated by underlying aortic stenosis.  Plan:  CBC, cmp, Type, INR, possible d/w EP

## 2019-04-20 NOTE — H&P ADULT - PROBLEM SELECTOR PLAN 1
-Likely UGIB given hx of melena, quiac +. Maybe 2/2 PUD vs GERD/gastritis vs malignancy (hx of smoking, weight loss, night sweats). Pt taking ibuprofen once per week chronically. On apixaban and ASA daily.  -received pantoprazole 40 in ED. Will give another 40 now and continue w 40 BID  -Hb 9, vitals jorje. GI consulted via email. Likely EGD next week.   -hold apixaban and ASA 81.   -Will trend CBC daily, maintain active type and screen. -Likely UGIB given hx of melena, quiac +. Maybe 2/2 PUD vs GERD/gastritis vs malignancy (hx of smoking, uniten. weight loss, night sweats) vs AVM from aortic stenosis. Pt taking ibuprofen once per week chronically. On apixaban and ASA daily.  -received pantoprazole 40 in ED. Will give another 40 now and continue w 40 BID  -Hb 9, vitals jorje. GI consulted via email. Likely EGD next week.   -hold apixaban and ASA 81.   -Will trend CBC daily, maintain active type and screen.  -monitor for melena with stool -Likely UGIB given hx of melena, guaiac +. Maybe 2/2 PUD vs GERD/gastritis vs malignancy (hx of smoking, unintentional weight loss, night sweats) vs AVM from aortic stenosis. Pt taking ibuprofen once per week chronically. On apixaban and ASA daily.  -received pantoprazole 40 in ED. Will give another 40 now and continue w 40 IV BID  -Hb 9, vitals jorje. GI consulted via email. Likely EGD next week.   -hold apixaban and ASA 81.   -Will trend CBC daily, maintain active type and screen.  -monitor for melena with stool

## 2019-04-20 NOTE — ED PROVIDER NOTE - PROGRESS NOTE DETAILS
Resident: Mike Vallejo - Paged Dr. Yi regarding admission, awaiting callback. Resident: Mike Vallejo - Spoke with Dr. Yi and she would like pt admitted to hospitalist service. GI consulted, will see patient. - resident Cl Vallejo

## 2019-04-20 NOTE — H&P ADULT - HISTORY OF PRESENT ILLNESS
The patient is a 65 yo F with a PMH of GERD, T2DM, HTN, anxiety, valvular stenosis (on apixaban), right arm fracture (2013, takes ibuprofen once per week prior to physical therapy), s/p PPM (2013), who presents to ED with 2 days of black, tarry stools and intermittent episodes nausea, dizziness, and pressure like sensation running from his abdomen up his central chest to his forehead. He reports he has never had melena in the past. He reports that he was in normal state of health prior to 2 days ago. He endorses a hx of intermittent GERD. He reports that he takes ibuprofen once per week prior to therapy for his right arm fracture.   He has no hx of ulcers or chronic steroid use. He denies CP, SOB, Abd pain, VD, BRPBR, hematemesis. The patient is a 63 yo F with a PMH of GERD, T2DM, HTN, anxiety, valvular stenosis (on apixaban), right arm fracture (2013, takes ibuprofen once per week prior to physical therapy), s/p PPM (2013), who presents to ED with 2 days of black, tarry stools and intermittent episodes nausea, dizziness, muscle weakness and pressure like sensation running from his abdomen up his central chest to his forehead. He reports he has never had melena in the past. He reports that he was in normal state of health prior to 2 days ago. He endorses a hx of intermittent GERD. He reports that he takes ibuprofen once per week prior to therapy for his right arm fracture.   He has no hx of ulcers or chronic steroid use. He and his wife report he appears more pale than usual. He denies CP, SOB, Abd pain, VD, BRPBR, hematemesis. ROS+ for 7 pounds of unintentional weight loss as well as frequent night sweats which drench his pillow. He reports that he smoked 1.5 PPD for 20 years however quit in 2001. The patient is a 63 yo F with a PMH of GERD, T2DM, HTN, anxiety, valvular stenosis (on apixaban), right arm fracture (2013, takes ibuprofen once per week prior to physical therapy), s/p PPM (2013), who presents to ED with 2 days of black, tarry stools and intermittent episodes nausea, dizziness, muscle weakness and pressure like sensation running from his abdomen up his central chest to his forehead. He reports he has never had melena in the past. He reports that he was in normal state of health prior to 2 days ago. He endorses a hx of intermittent GERD. He reports that he takes ibuprofen once per week prior to therapy for his right arm fracture.   He has no hx of ulcers or chronic steroid use. He and his wife report he appears more pale than usual. He denies CP, SOB, Abd pain, VD, BRPBR, hematemesis. ROS+ for 7 pounds of unintentional weight loss as well as frequent night sweats which drench his pillow. He reports that he smoked 1.5 PPD for 20 years however quit in 2001.     In the ED vital signs: 97.5, 60, 105/67, 18, 98. The patient received 1L of LR and was given 40 of pantoprazole IV. The patient is a 63 yo F with a PMH of GERD, T2DM, HTN, anxiety, Atrial flutter (on apixaban), aortic stenosis, right arm fracture (2013, takes ibuprofen once per week prior to physical therapy), s/p PPM (2013, last interrogated 03/19) who presents to ED with 2 days of black, tarry stools and intermittent episodes nausea, dizziness, muscle weakness and pressure like sensation running from his abdomen up his central chest to his forehead. He reports he has never had melena in the past. He reports that he was in normal state of health prior to 2 days ago. He endorses a hx of intermittent GERD. He reports that he takes ibuprofen once per week prior to therapy for his right arm fracture.   He has no hx of ulcers or chronic steroid use. He and his wife report he appears more pale than usual. He denies CP, SOB, Abd pain, VD, BRPBR, hematemesis. ROS+ for 7 pounds of unintentional weight loss as well as frequent night sweats which drench his pillow. He reports that he smoked 1.5 PPD for 20 years however quit in 2001.     In the ED vital signs: 97.5, 60, 105/67, 18, 98. The patient received 1L of LR and was given 40 of pantoprazole IV.

## 2019-04-20 NOTE — ED ADULT NURSE REASSESSMENT NOTE - NS ED NURSE REASSESS COMMENT FT1
Patient received 1900 from [facility]. Receive be assignment and awaiting transport. Denies complaints. Aware of care plan. Wife at bedside.

## 2019-04-20 NOTE — H&P ADULT - NSHPREVIEWOFSYSTEMS_GEN_ALL_CORE
Constitutional: denies fevers, chills,  HEENT: denies visual changes, hearing changes, rhinitis, odynophagia, or dysphagia  Cardiovascular: denies palpitations, chest pain, edema  Respiratory: denies SOB, wheezing  Gastrointestinal: denies V/D, abdominal pain, hematochezia,   : denies dysuria, hematuria  MSK:  joint pain  Skin: denies new rashes or masses  Heme: denies bleeding  Neuro: denies headache Constitutional: denies fevers, chills,  HEENT: denies visual changes, hearing changes, rhinitis, odynophagia, or dysphagia  Cardiovascular: denies palpitations, chest pain, edema  Respiratory: denies SOB, wheezing  Gastrointestinal: denies V/D, abdominal pain, hematochezia,   : denies dysuria, hematuria  MSK: no joint pain  Skin: denies new rashes or masses  Heme: denies bleeding  Neuro: denies headache

## 2019-04-21 DIAGNOSIS — I48.92 UNSPECIFIED ATRIAL FLUTTER: ICD-10-CM

## 2019-04-21 LAB
ANION GAP SERPL CALC-SCNC: 14 MMOL/L — SIGNIFICANT CHANGE UP (ref 5–17)
BASOPHILS # BLD AUTO: 0.04 K/UL — SIGNIFICANT CHANGE UP (ref 0–0.2)
BASOPHILS NFR BLD AUTO: 0.3 % — SIGNIFICANT CHANGE UP (ref 0–2)
BUN SERPL-MCNC: 37 MG/DL — HIGH (ref 7–23)
CALCIUM SERPL-MCNC: 8.9 MG/DL — SIGNIFICANT CHANGE UP (ref 8.4–10.5)
CHLORIDE SERPL-SCNC: 104 MMOL/L — SIGNIFICANT CHANGE UP (ref 96–108)
CO2 SERPL-SCNC: 21 MMOL/L — LOW (ref 22–31)
CREAT SERPL-MCNC: 1.35 MG/DL — HIGH (ref 0.5–1.3)
EOSINOPHIL # BLD AUTO: 0.24 K/UL — SIGNIFICANT CHANGE UP (ref 0–0.5)
EOSINOPHIL NFR BLD AUTO: 2.1 % — SIGNIFICANT CHANGE UP (ref 0–6)
GLUCOSE SERPL-MCNC: 170 MG/DL — HIGH (ref 70–99)
HBA1C BLD-MCNC: 9.8 % — HIGH (ref 4–5.6)
HCT VFR BLD CALC: 23 % — LOW (ref 39–50)
HCT VFR BLD CALC: 24.1 % — LOW (ref 39–50)
HCV AB S/CO SERPL IA: 0.06 S/CO — SIGNIFICANT CHANGE UP (ref 0–0.99)
HCV AB SERPL-IMP: SIGNIFICANT CHANGE UP
HGB BLD-MCNC: 7.5 G/DL — LOW (ref 13–17)
HGB BLD-MCNC: 7.8 G/DL — LOW (ref 13–17)
IMM GRANULOCYTES NFR BLD AUTO: 0.7 % — SIGNIFICANT CHANGE UP (ref 0–1.5)
LYMPHOCYTES # BLD AUTO: 1.97 K/UL — SIGNIFICANT CHANGE UP (ref 1–3.3)
LYMPHOCYTES # BLD AUTO: 17 % — SIGNIFICANT CHANGE UP (ref 13–44)
MCHC RBC-ENTMCNC: 26 PG — LOW (ref 27–34)
MCHC RBC-ENTMCNC: 28.3 PG — SIGNIFICANT CHANGE UP (ref 27–34)
MCHC RBC-ENTMCNC: 31.1 GM/DL — LOW (ref 32–36)
MCHC RBC-ENTMCNC: 34.1 GM/DL — SIGNIFICANT CHANGE UP (ref 32–36)
MCV RBC AUTO: 83.1 FL — SIGNIFICANT CHANGE UP (ref 80–100)
MCV RBC AUTO: 83.7 FL — SIGNIFICANT CHANGE UP (ref 80–100)
MONOCYTES # BLD AUTO: 1.15 K/UL — HIGH (ref 0–0.9)
MONOCYTES NFR BLD AUTO: 9.9 % — SIGNIFICANT CHANGE UP (ref 2–14)
NEUTROPHILS # BLD AUTO: 8.08 K/UL — HIGH (ref 1.8–7.4)
NEUTROPHILS NFR BLD AUTO: 70 % — SIGNIFICANT CHANGE UP (ref 43–77)
PLATELET # BLD AUTO: 262 K/UL — SIGNIFICANT CHANGE UP (ref 150–400)
PLATELET # BLD AUTO: 292 K/UL — SIGNIFICANT CHANGE UP (ref 150–400)
POTASSIUM SERPL-MCNC: 4.1 MMOL/L — SIGNIFICANT CHANGE UP (ref 3.5–5.3)
POTASSIUM SERPL-SCNC: 4.1 MMOL/L — SIGNIFICANT CHANGE UP (ref 3.5–5.3)
RBC # BLD: 2.77 M/UL — LOW (ref 4.2–5.8)
RBC # BLD: 2.88 M/UL — LOW (ref 4.2–5.8)
RBC # FLD: 12.9 % — SIGNIFICANT CHANGE UP (ref 10.3–14.5)
RBC # FLD: 13.2 % — SIGNIFICANT CHANGE UP (ref 10.3–14.5)
SODIUM SERPL-SCNC: 139 MMOL/L — SIGNIFICANT CHANGE UP (ref 135–145)
WBC # BLD: 11.56 K/UL — HIGH (ref 3.8–10.5)
WBC # BLD: 13 K/UL — HIGH (ref 3.8–10.5)
WBC # FLD AUTO: 11.56 K/UL — HIGH (ref 3.8–10.5)
WBC # FLD AUTO: 13 K/UL — HIGH (ref 3.8–10.5)

## 2019-04-21 PROCEDURE — 99233 SBSQ HOSP IP/OBS HIGH 50: CPT

## 2019-04-21 RX ORDER — INSULIN GLARGINE 100 [IU]/ML
8 INJECTION, SOLUTION SUBCUTANEOUS ONCE
Qty: 0 | Refills: 0 | Status: COMPLETED | OUTPATIENT
Start: 2019-04-21 | End: 2019-04-21

## 2019-04-21 RX ORDER — INSULIN GLARGINE 100 [IU]/ML
15 INJECTION, SOLUTION SUBCUTANEOUS AT BEDTIME
Qty: 0 | Refills: 0 | Status: DISCONTINUED | OUTPATIENT
Start: 2019-04-21 | End: 2019-04-24

## 2019-04-21 RX ADMIN — PANTOPRAZOLE SODIUM 40 MILLIGRAM(S): 20 TABLET, DELAYED RELEASE ORAL at 17:34

## 2019-04-21 RX ADMIN — Medication 6: at 11:59

## 2019-04-21 RX ADMIN — Medication 2: at 17:34

## 2019-04-21 RX ADMIN — Medication 2: at 08:39

## 2019-04-21 RX ADMIN — PANTOPRAZOLE SODIUM 40 MILLIGRAM(S): 20 TABLET, DELAYED RELEASE ORAL at 06:34

## 2019-04-21 RX ADMIN — INSULIN GLARGINE 8 UNIT(S): 100 INJECTION, SOLUTION SUBCUTANEOUS at 23:50

## 2019-04-21 RX ADMIN — Medication 40 MILLIGRAM(S): at 00:37

## 2019-04-21 RX ADMIN — Medication 40 MILLIGRAM(S): at 21:30

## 2019-04-21 NOTE — CONSULT NOTE ADULT - ATTENDING COMMENTS
Patient seen and examined. Agree with above. Continue PPI, plan for EGD for evaluation of melena. Monitor blood counts, transfuse for Hgb ~8.

## 2019-04-21 NOTE — PROGRESS NOTE ADULT - SUBJECTIVE AND OBJECTIVE BOX
Patient is a 64y old  Male who presents with a chief complaint of Melena (2019 12:50)        SUBJECTIVE / OVERNIGHT EVENTS: Patient denies any SOB or CP. He says he hasn't had a BM yet today. He denies any pain. He says he feels bloated.       MEDICATIONS  (STANDING):  dextrose 5%. 1000 milliLiter(s) (50 mL/Hr) IV Continuous <Continuous>  dextrose 50% Injectable 12.5 Gram(s) IV Push once  dextrose 50% Injectable 25 Gram(s) IV Push once  dextrose 50% Injectable 25 Gram(s) IV Push once  influenza   Vaccine 0.5 milliLiter(s) IntraMuscular once  insulin glargine Injectable (LANTUS) 30 Unit(s) SubCutaneous at bedtime  insulin lispro (HumaLOG) corrective regimen sliding scale   SubCutaneous three times a day before meals  insulin lispro (HumaLOG) corrective regimen sliding scale   SubCutaneous at bedtime  pantoprazole  Injectable 40 milliGRAM(s) IV Push every 12 hours  PARoxetine 40 milliGRAM(s) Oral daily    MEDICATIONS  (PRN):  dextrose 40% Gel 15 Gram(s) Oral once PRN Blood Glucose LESS THAN 70 milliGRAM(s)/deciliter  glucagon  Injectable 1 milliGRAM(s) IntraMuscular once PRN Glucose LESS THAN 70 milligrams/deciliter      Vital Signs Last 24 Hrs  T(C): 36.4 (2019 14:54), Max: 36.8 (2019 21:27)  T(F): 97.5 (2019 14:54), Max: 98.2 (2019 21:27)  HR: 60 (2019 14:54) (60 - 62)  BP: 144/73 (2019 14:54) (125/65 - 162/59)  BP(mean): --  RR: 19 (2019 14:54) (18 - 20)  SpO2: 96% (2019 14:54) (95% - 99%)  CAPILLARY BLOOD GLUCOSE      POCT Blood Glucose.: 158 mg/dL (2019 17:24)  POCT Blood Glucose.: 296 mg/dL (2019 11:58)  POCT Blood Glucose.: 166 mg/dL (2019 08:02)  POCT Blood Glucose.: 270 mg/dL (2019 23:27)    I&O's Summary    2019 07:  -  2019 07:00  --------------------------------------------------------  IN: 360 mL / OUT: 0 mL / NET: 360 mL    2019 07:  -  2019 17:28  --------------------------------------------------------  IN: 1160 mL / OUT: 0 mL / NET: 1160 mL          PHYSICAL EXAM:   GENERAL: NAD, well-developed, pale appearing  HEAD:  Atraumatic, Normocephalic  EYES: Conjunctiva and sclera mild pallor  NECK: Supple  CHEST/LUNG: Clear to auscultation bilaterally; No wheeze  HEART: S1S2 normal. Regular rate and rhythm; systolic murmur present.   ABDOMEN: Soft, Nontender, Nondistended; Bowel sounds present  EXTREMITIES: No LE edema  PSYCH/Neuro: AAOx3. Non-focal.   SKIN: No rashes or lesions      LABS:                        7.8    13.0  )-----------( 262      ( 2019 16:24 )             23.0         139  |  104  |  37<H>  ----------------------------<  170<H>  4.1   |  21<L>  |  1.35<H>    Ca    8.9      2019 06:35    TPro  7.8  /  Alb  4.4  /  TBili  0.2  /  DBili  x   /  AST  21  /  ALT  15  /  AlkPhos  96  04-20    PT/INR - ( 2019 17:08 )   PT: 14.3 sec;   INR: 1.24 ratio         PTT - ( 2019 17:08 )  PTT:30.8 sec      Urinalysis Basic - ( 2019 17:17 )    Color: Light Yellow / Appearance: Clear / S.025 / pH: x  Gluc: x / Ketone: Negative  / Bili: Negative / Urobili: Negative   Blood: x / Protein: Negative / Nitrite: Negative   Leuk Esterase: Negative / RBC: x / WBC x   Sq Epi: x / Non Sq Epi: x / Bacteria: x          RADIOLOGY & ADDITIONAL TESTS:    Imaging Personally Reviewed:   Consultant(s) Notes Reviewed:  GI  Care Discussed with Consultants/Other Providers:

## 2019-04-21 NOTE — CONSULT NOTE ADULT - ASSESSMENT
64 year old male with T2DM, HTN, anxiety, Atrial flutter (on apixaban), moderate aortic stenosis, right arm fracture (2013, takes ibuprofen once per week prior to physical therapy), s/p PPM (2013, last interrogated 03/19), GERD who presented to the ER with chief complain of black, tarry stools for 2 days.     IMPRESSION  - Acute blood loss anemis with melena Ddx: esophagitis, varices, PUD, erosive gastritis, dieulafoy lesion, angioectasia, stomach cancer    RECOMMENDATION:    - trend CBC, CMP, INR, transfuse as needed  - continue pantoprazole 40mg IV BID  - plan for upper endoscopy tomorrow 4/22/2019  - clear liquid diet today, please keep NPO after midnight   - supportive care as per primary team      Shahriar Dolan, PGY-5  GI fellow  B- [id number]/ [phone]  Please call GI fellow on call after 5pm and on weekends 64 year old male with T2DM, HTN, anxiety, Atrial flutter (on apixaban), moderate aortic stenosis, right arm fracture (2013, takes ibuprofen once per week prior to physical therapy), s/p PPM (2013, last interrogated 03/19), GERD who presented to the ER with chief complain of black, tarry stools for 2 days.     IMPRESSION  - Acute blood loss anemis with melena (Hb 9-->7.5) Ddx: esophagitis, varices, PUD, erosive gastritis, dieulafoy lesion, angioectasia, stomach cancer  - Atrial flutter (on apixaban), moderate AS, PPM placed    RECOMMENDATION:    - trend CBC, CMP, INR, transfuse as needed  - continue pantoprazole 40mg IV BID  - plan for upper endoscopy tomorrow 4/22/2019  - clear liquid diet today, please keep NPO after midnight   - would appreciate Cardiology clearance for the procedure  - recommending holding Apixaban (if no contraindications), in the setting of active GI bleeding   - supportive care as per primary team      Shahriar Dolan, PGY-5  GI fellow  B- [id number]/ [phone]  Please call GI fellow on call after 5pm and on weekends 64 year old male with T2DM, HTN, anxiety, Atrial flutter (on apixaban), moderate aortic stenosis, right arm fracture (2013, takes ibuprofen once per week prior to physical therapy), s/p PPM (2013, last interrogated 03/19), GERD who presented to the ER with chief complain of black, tarry stools for 2 days.     IMPRESSION  - Acute blood loss anemis with melena (Hb 9-->7.5) Ddx: esophagitis, varices, PUD, erosive gastritis, dieulafoy lesion, angioectasia, stomach cancer  - Atrial flutter (on apixaban), moderate AS, PPM placed    RECOMMENDATION:    - trend CBC, CMP, INR, transfuse as needed  - continue pantoprazole 40mg IV BID  - plan for upper endoscopy tomorrow 4/22/2019  - clear liquid diet today, please keep NPO after midnight   - would appreciate Cardiology risk stratification for the procedure  - recommending holding Apixaban (if no contraindications), in the setting of active GI bleeding   - supportive care as per primary team      Shahriar Dolan, PGY-5  GI fellow  B- [id number]/ [phone]  Please call GI fellow on call after 5pm and on weekends 64 year old male with T2DM, HTN, anxiety, Atrial flutter (on apixaban), moderate aortic stenosis, right arm fracture (2013, takes ibuprofen once per week prior to physical therapy), s/p PPM (2013, last interrogated 03/19), GERD who presented to the ER with chief complain of black, tarry stools for 2 days.     IMPRESSION  - Acute blood loss anemis with melena (Hb 9-->7.5), in the setting of recent NSAID use Ddx: esophagitis, varices, PUD, erosive gastritis, dieulafoy lesion, angioectasia, stomach cancer  - Atrial flutter (on apixaban), moderate AS, PPM placed    RECOMMENDATION:    - trend CBC, CMP, INR, transfuse as needed  - continue pantoprazole 40mg IV BID  - plan for upper endoscopy tomorrow 4/22/2019  - clear liquid diet today, please keep NPO after midnight   - would appreciate Cardiology risk stratification for the procedure (follows with Dr Hilton as outpatient)  - recommending holding Apixaban (if no contraindications), in the setting of active GI bleeding   - he will need a colonoscopy as an outpatient on discharge for colorectal cancer screening   - supportive care as per primary team      Shahriar Dolan, PGY-5  GI fellow  B- [id number]/ [phone]  Please call GI fellow on call after 5pm and on weekends

## 2019-04-21 NOTE — CONSULT NOTE ADULT - SUBJECTIVE AND OBJECTIVE BOX
Chief Complaint:  Patient is a 64y old  Male who presents with a chief complaint of Melena (2019 21:39)      HPI:  64 year old male with T2DM, HTN, anxiety, Atrial flutter (on apixaban), moderate aortic stenosis, right arm fracture (, takes ibuprofen once per week prior to physical therapy), s/p PPM (, last interrogated ), GERD who presented to the ER with chief complain of black, tarry stools for 2 days.     As per the patient, he has had intermittent episodes of nausea and dizziness for the past few days. He also has muscle weakness and pressure like sensation running from his abdomen up his central chest to his forehead. He has now noticed black tarry stool for 2 days. He denies vomiting, abdominal pain, BRPBR, hematemesis. but reports unintentional weight loss (7lb).     He reports that he takes ibuprofen once per week prior to therapy for his right arm fracture. He denies history of gastric ulcers.     In the ED vital signs: 97.5, 60, 105/67, 18, 98.    Last EGD:   Last colonoscopy:   Outpatient GI:     Allergies:  No Known Drug Allergies  pollen (Rhinitis; Rhinorrhea; Eye Irritation)      Home Medications:  Incomplete Medication History as of 2019 20:58 documented in Structured Notes  · 	gemfibrozil 600 mg oral tablet: 1 tab(s) orally 2 times a day, Last Dose Taken:    · 	glimepiride 4 mg oral tablet: 1 tab(s) orally once a day, Last Dose Taken:    · 	lisinopril 20 mg oral tablet: 1 tab(s) orally once a day, Last Dose Taken:    · 	PARoxetine 40 mg oral tablet: 1 tab(s) orally once a day, Last Dose Taken:    · 	Eliquis 5 mg oral tablet: 1 tab(s) orally 2 times a day  	Note: per pt may be held as of 19, Last Dose Taken: 2019 PM  · 	Toujeo SoloStar 300 units/mL subcutaneous solution: 30 unit(s) subcutaneous once a day (at bedtime), Last Dose Taken:    · 	multivitamin: 1 tab(s) orally once a day, Last Dose Taken:    · 	aspirin 81 mg oral delayed release tablet: 1 tab(s) orally once a day, Last Dose Taken:    · 	Jardiance 25 mg oral tablet: 1 tab(s) orally once a day, Last Dose Taken:    · 	metFORMIN 500 mg oral tablet: 1 tab(s) orally 2 times a day, Last Dose Taken:          Hospital Medications:  dextrose 40% Gel 15 Gram(s) Oral once PRN  dextrose 5%. 1000 milliLiter(s) IV Continuous <Continuous>  dextrose 50% Injectable 12.5 Gram(s) IV Push once  dextrose 50% Injectable 25 Gram(s) IV Push once  dextrose 50% Injectable 25 Gram(s) IV Push once  glucagon  Injectable 1 milliGRAM(s) IntraMuscular once PRN  influenza   Vaccine 0.5 milliLiter(s) IntraMuscular once  insulin glargine Injectable (LANTUS) 30 Unit(s) SubCutaneous at bedtime  insulin lispro (HumaLOG) corrective regimen sliding scale   SubCutaneous three times a day before meals  insulin lispro (HumaLOG) corrective regimen sliding scale   SubCutaneous at bedtime  pantoprazole  Injectable 40 milliGRAM(s) IV Push every 12 hours  PARoxetine 40 milliGRAM(s) Oral daily      PMHX/PSHX:  Arrhythmia as indication for cardiac pacemaker replacement  HLD (hyperlipidemia)  Depression  Diabetes mellitus type 2 in obese  HTN (hypertension)  Diabetes mellitus type II, controlled, with no complications  Diabetes mellitus type II  Dyslipidemia  HTN (hypertension)  No significant past surgical history      Family history:  No pertinent family history in first degree relatives  Denies family history of colon cancer, liver cancer, uterine cancer, ovarian cancer, breast cancer, gastric ulcers, colon polyps, gallstones    Social History:   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   No drugs etoh tobacco use currently  Smoked 1.5 PPD for 20 years quit     ROS:     General:  No wt loss, fevers, chills, night sweats, fatigue,   Eyes:  Good vision, no reported pain  ENT:  No sore throat, pain, runny nose, dysphagia  CV:  No pain, palpitations, hypo/hypertension  Resp:  No dyspnea, cough, tachypnea, wheezing  GI:  See HPI  :  No pain, bleeding, incontinence, nocturia  Muscle:  No pain, weakness  Neuro:  No weakness, tingling, memory problems  Psych:  No fatigue, insomnia, mood problems, depression  Endocrine:  No polyuria, polydipsia, cold/heat intolerance  Heme:  No petechiae, ecchymosis, easy bruisability  Skin:  No rash, edema      PHYSICAL EXAM:     GENERAL:  Appears stated age, well-groomed, well-nourished, no distress  HEENT:  NC/AT,  conjunctivae clear and pink,  no JVD  CHEST:  Full & symmetric excursion, no increased effort, breath sounds clear  HEART:  Regular rhythm, S1, S2, no murmur/rub/S3/S4, no abdominal bruit, no edema  ABDOMEN:  Soft, non-tender, non-distended, normoactive bowel sounds,  no masses ,  EXTREMITIES:  no cyanosis,clubbing or edema  SKIN:  No rash/erythema/ecchymoses/petechiae/wounds/abscess/warm/dry  NEURO:  Alert, oriented    Vital Signs:  Vital Signs Last 24 Hrs  T(C): 36.5 (2019 04:55), Max: 36.8 (2019 21:27)  T(F): 97.7 (2019 04:55), Max: 98.2 (2019 21:27)  HR: 60 (2019 04:55) (60 - 62)  BP: 162/59 (2019 04:55) (105/67 - 162/59)  BP(mean): --  RR: 18 (2019 04:55) (16 - 20)  SpO2: 95% (2019 04:55) (95% - 99%)  Daily Height in cm: 182.88 (2019 21:27)    Daily Weight in k.4 (2019 21:27)    LABS:                        7.5    11.56 )-----------( 292      ( 2019 09:19 )             24.1     04-21    139  |  104  |  37<H>  ----------------------------<  170<H>  4.1   |  21<L>  |  1.35<H>    Ca    8.9      2019 06:35    TPro  7.8  /  Alb  4.4  /  TBili  0.2  /  DBili  x   /  AST  21  /  ALT  15  /  AlkPhos  96  04-20    LIVER FUNCTIONS - ( 2019 17:08 )  Alb: 4.4 g/dL / Pro: 7.8 g/dL / ALK PHOS: 96 U/L / ALT: 15 U/L / AST: 21 U/L / GGT: x           PT/INR - ( 2019 17:08 )   PT: 14.3 sec;   INR: 1.24 ratio         PTT - ( 2019 17:08 )  PTT:30.8 sec  Urinalysis Basic - ( 2019 17:17 )    Color: Light Yellow / Appearance: Clear / S.025 / pH: x  Gluc: x / Ketone: Negative  / Bili: Negative / Urobili: Negative   Blood: x / Protein: Negative / Nitrite: Negative   Leuk Esterase: Negative / RBC: x / WBC x   Sq Epi: x / Non Sq Epi: x / Bacteria: x      Imaging:    < from: Transthoracic Echocardiogram w/Doppler (13 @ 11:28) >  Conclusions:  1. Normal mitral valve. Minimal mitral regurgitation.  2. Aortic valve leaflet morphology not well visualized.  The valve is calcified. Peak transaortic valve gradient  equals 54 mm Hg, mean transaortic valve gradient equals 37  mm Hg, consistent with at least moderate aortic stenosis.  However, unable to accurately estimate the aortic valve  area.  3. Normal left ventricular internal dimensions and wall  thicknesses.  4. Endocardium not well visualized; grossly normal left  ventricular systolic function.  5. Normal diastolic function  6. The right ventricle is not well visualized; grossly  normalright ventricular systolic function.  7. Estimated right ventricular systolic pressure equals 40  mm Hg, assuming right atrial pressure equals 10 mm Hg,  consistent with mild pulmonary hypertension.  Recommend RAJAT for further evaluation of the aorticvalve,  if clinically indicated.    < end of copied text > Chief Complaint:  Patient is a 64y old  Male who presents with a chief complaint of Melena (2019 21:39)      HPI:  64 year old male with T2DM, HTN, anxiety, Atrial flutter (on apixaban), moderate aortic stenosis, right arm fracture (, takes ibuprofen once per week prior to physical therapy), s/p PPM (, last interrogated ), GERD who presented to the ER with chief complain of black, tarry stools for 2 days.     As per the patient, he has had intermittent episodes of nausea and dizziness for the past few days. He also has muscle weakness and pressure like sensation running from his abdomen up his central chest to his forehead. He has now noticed black tarry stool for 2 days. He denies vomiting, abdominal pain, BRPBR, hematemesis. but reports unintentional weight loss (7lb). At baseline, he has 1 BM per day, with no straining.     He reports that he takes ibuprofen once per week prior to therapy for his right arm fracture. He denies history of gastric ulcers.     In the ED vital signs: 97.5, 60, 105/67, 18, 98.    Last EGD: never   Last colonoscopy: never  Outpatient GI: nonr    Allergies:  No Known Drug Allergies  pollen (Rhinitis; Rhinorrhea; Eye Irritation)      Home Medications:  Incomplete Medication History as of 2019 20:58 documented in Structured Notes  · 	gemfibrozil 600 mg oral tablet: 1 tab(s) orally 2 times a day, Last Dose Taken:    · 	glimepiride 4 mg oral tablet: 1 tab(s) orally once a day, Last Dose Taken:    · 	lisinopril 20 mg oral tablet: 1 tab(s) orally once a day, Last Dose Taken:    · 	PARoxetine 40 mg oral tablet: 1 tab(s) orally once a day, Last Dose Taken:    · 	Eliquis 5 mg oral tablet: 1 tab(s) orally 2 times a day  	Note: per pt may be held as of 19, Last Dose Taken: 2019 PM  · 	Toujeo SoloStar 300 units/mL subcutaneous solution: 30 unit(s) subcutaneous once a day (at bedtime), Last Dose Taken:    · 	multivitamin: 1 tab(s) orally once a day, Last Dose Taken:    · 	aspirin 81 mg oral delayed release tablet: 1 tab(s) orally once a day, Last Dose Taken:    · 	Jardiance 25 mg oral tablet: 1 tab(s) orally once a day, Last Dose Taken:    · 	metFORMIN 500 mg oral tablet: 1 tab(s) orally 2 times a day, Last Dose Taken:          Hospital Medications:  dextrose 40% Gel 15 Gram(s) Oral once PRN  dextrose 5%. 1000 milliLiter(s) IV Continuous <Continuous>  dextrose 50% Injectable 12.5 Gram(s) IV Push once  dextrose 50% Injectable 25 Gram(s) IV Push once  dextrose 50% Injectable 25 Gram(s) IV Push once  glucagon  Injectable 1 milliGRAM(s) IntraMuscular once PRN  influenza   Vaccine 0.5 milliLiter(s) IntraMuscular once  insulin glargine Injectable (LANTUS) 30 Unit(s) SubCutaneous at bedtime  insulin lispro (HumaLOG) corrective regimen sliding scale   SubCutaneous three times a day before meals  insulin lispro (HumaLOG) corrective regimen sliding scale   SubCutaneous at bedtime  pantoprazole  Injectable 40 milliGRAM(s) IV Push every 12 hours  PARoxetine 40 milliGRAM(s) Oral daily      PMHX/PSHX:  Arrhythmia as indication for cardiac pacemaker replacement  HLD (hyperlipidemia)  Depression  Diabetes mellitus type 2 in obese  HTN (hypertension)  Diabetes mellitus type II, controlled, with no complications  Diabetes mellitus type II  Dyslipidemia  HTN (hypertension)  No significant past surgical history      Family history:  No pertinent family history in first degree relatives  Mother: breast cancer  Uncle: colon cancer  Denies family history of liver cancer, uterine cancer, ovarian cancer, gastric ulcers, colon polyps, gallstones    Social History:   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   No drugs etoh tobacco use currently  Smoked 1.5 PPD for 20 years quit     ROS:     General:  No wt loss, fevers, chills, night sweats, fatigue,   Eyes:  Good vision, no reported pain  ENT:  No sore throat, pain, runny nose, dysphagia  CV:  No pain, palpitations, hypo/hypertension  Resp:  No dyspnea, cough, tachypnea, wheezing  GI:  See HPI  :  No pain, bleeding, incontinence, nocturia  Muscle:  No pain, weakness  Neuro:  No weakness, tingling, memory problems  Psych:  No fatigue, insomnia, mood problems, depression  Endocrine:  No polyuria, polydipsia, cold/heat intolerance  Heme:  No petechiae, ecchymosis, easy bruisability  Skin:  No rash, edema      PHYSICAL EXAM:     GENERAL:  Appears stated age, well-groomed, well-nourished, no distress  HEENT:  NC/AT,  conjunctivae clear and pink,  no JVD  CHEST:  Full & symmetric excursion, no increased effort, breath sounds clear  HEART:  Regular rhythm, S1, S2, no murmur/rub/S3/S4, no abdominal bruit, no edema  ABDOMEN:  Soft, non-tender, non-distended, normoactive bowel sounds,  no masses ,  EXTREMITIES:  no cyanosis,clubbing or edema  SKIN:  No rash/erythema/ecchymoses/petechiae/wounds/abscess/warm/dry  NEURO:  Alert, oriented    Vital Signs:  Vital Signs Last 24 Hrs  T(C): 36.5 (2019 04:55), Max: 36.8 (2019 21:27)  T(F): 97.7 (2019 04:55), Max: 98.2 (2019 21:27)  HR: 60 (2019 04:55) (60 - 62)  BP: 162/59 (2019 04:55) (105/67 - 162/59)  BP(mean): --  RR: 18 (2019 04:55) (16 - 20)  SpO2: 95% (2019 04:55) (95% - 99%)  Daily Height in cm: 182.88 (2019 21:27)    Daily Weight in k.4 (2019 21:27)    LABS:                        7.5    11.56 )-----------( 292      ( 2019 09:19 )             24.1     -    139  |  104  |  37<H>  ----------------------------<  170<H>  4.1   |  21<L>  |  1.35<H>    Ca    8.9      2019 06:35    TPro  7.8  /  Alb  4.4  /  TBili  0.2  /  DBili  x   /  AST  21  /  ALT  15  /  AlkPhos  96  04-20    LIVER FUNCTIONS - ( 2019 17:08 )  Alb: 4.4 g/dL / Pro: 7.8 g/dL / ALK PHOS: 96 U/L / ALT: 15 U/L / AST: 21 U/L / GGT: x           PT/INR - ( 2019 17:08 )   PT: 14.3 sec;   INR: 1.24 ratio         PTT - ( 2019 17:08 )  PTT:30.8 sec  Urinalysis Basic - ( 2019 17:17 )    Color: Light Yellow / Appearance: Clear / S.025 / pH: x  Gluc: x / Ketone: Negative  / Bili: Negative / Urobili: Negative   Blood: x / Protein: Negative / Nitrite: Negative   Leuk Esterase: Negative / RBC: x / WBC x   Sq Epi: x / Non Sq Epi: x / Bacteria: x      Imaging:    < from: Transthoracic Echocardiogram w/Doppler (13 @ 11:28) >  Conclusions:  1. Normal mitral valve. Minimal mitral regurgitation.  2. Aortic valve leaflet morphology not well visualized.  The valve is calcified. Peak transaortic valve gradient  equals 54 mm Hg, mean transaortic valve gradient equals 37  mm Hg, consistent with at least moderate aortic stenosis.  However, unable to accurately estimate the aortic valve  area.  3. Normal left ventricular internal dimensions and wall  thicknesses.  4. Endocardium not well visualized; grossly normal left  ventricular systolic function.  5. Normal diastolic function  6. The right ventricle is not well visualized; grossly  normalright ventricular systolic function.  7. Estimated right ventricular systolic pressure equals 40  mm Hg, assuming right atrial pressure equals 10 mm Hg,  consistent with mild pulmonary hypertension.  Recommend RAJAT for further evaluation of the aorticvalve,  if clinically indicated.    < end of copied text > Chief Complaint:  Patient is a 64y old  Male who presents with a chief complaint of Melena (2019 21:39)      HPI:  64 year old male with T2DM, HTN, anxiety, Atrial flutter (on apixaban), moderate aortic stenosis, right arm fracture (, takes ibuprofen once per week prior to physical therapy), s/p PPM (, last interrogated ), GERD who presented to the ER with chief complain of black, tarry stools for 2 days.     As per the patient, he has had intermittent episodes of nausea and dizziness for the past few days. He also has muscle weakness and pressure like sensation running from his abdomen up his central chest to his forehead. He has now noticed black tarry stool for 2 days. He denies vomiting, abdominal pain, BRPBR, hematemesis. but reports unintentional weight loss (7lb). At baseline, he has 1 BM per day, with no straining.     He reports that he takes ibuprofentherapy for his right arm fracture. He denies history of gastric ulcers.     In the ED vital signs: 97.5, 60, 105/67, 18, 98.    Last EGD: never   Last colonoscopy: never  Outpatient GI: nonr    Allergies:  No Known Drug Allergies  pollen (Rhinitis; Rhinorrhea; Eye Irritation)      Home Medications:  Incomplete Medication History as of 2019 20:58 documented in Structured Notes  · 	gemfibrozil 600 mg oral tablet: 1 tab(s) orally 2 times a day, Last Dose Taken:    · 	glimepiride 4 mg oral tablet: 1 tab(s) orally once a day, Last Dose Taken:    · 	lisinopril 20 mg oral tablet: 1 tab(s) orally once a day, Last Dose Taken:    · 	PARoxetine 40 mg oral tablet: 1 tab(s) orally once a day, Last Dose Taken:    · 	Eliquis 5 mg oral tablet: 1 tab(s) orally 2 times a day  	Note: per pt may be held as of 19, Last Dose Taken: 2019 PM  · 	Toujeo SoloStar 300 units/mL subcutaneous solution: 30 unit(s) subcutaneous once a day (at bedtime), Last Dose Taken:    · 	multivitamin: 1 tab(s) orally once a day, Last Dose Taken:    · 	aspirin 81 mg oral delayed release tablet: 1 tab(s) orally once a day, Last Dose Taken:    · 	Jardiance 25 mg oral tablet: 1 tab(s) orally once a day, Last Dose Taken:    · 	metFORMIN 500 mg oral tablet: 1 tab(s) orally 2 times a day, Last Dose Taken:          Hospital Medications:  dextrose 40% Gel 15 Gram(s) Oral once PRN  dextrose 5%. 1000 milliLiter(s) IV Continuous <Continuous>  dextrose 50% Injectable 12.5 Gram(s) IV Push once  dextrose 50% Injectable 25 Gram(s) IV Push once  dextrose 50% Injectable 25 Gram(s) IV Push once  glucagon  Injectable 1 milliGRAM(s) IntraMuscular once PRN  influenza   Vaccine 0.5 milliLiter(s) IntraMuscular once  insulin glargine Injectable (LANTUS) 30 Unit(s) SubCutaneous at bedtime  insulin lispro (HumaLOG) corrective regimen sliding scale   SubCutaneous three times a day before meals  insulin lispro (HumaLOG) corrective regimen sliding scale   SubCutaneous at bedtime  pantoprazole  Injectable 40 milliGRAM(s) IV Push every 12 hours  PARoxetine 40 milliGRAM(s) Oral daily      PMHX/PSHX:  Arrhythmia as indication for cardiac pacemaker replacement  HLD (hyperlipidemia)  Depression  Diabetes mellitus type 2 in obese  HTN (hypertension)  Diabetes mellitus type II, controlled, with no complications  Diabetes mellitus type II  Dyslipidemia  HTN (hypertension)  No significant past surgical history      Family history:  No pertinent family history in first degree relatives  Mother: breast cancer  Uncle: colon cancer  Denies family history of liver cancer, uterine cancer, ovarian cancer, gastric ulcers, colon polyps, gallstones    Social History:   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   No drugs etoh tobacco use currently  Smoked 1.5 PPD for 20 years quit     ROS:     General:  No wt loss, fevers, chills, night sweats, fatigue,   Eyes:  Good vision, no reported pain  ENT:  No sore throat, pain, runny nose, dysphagia  CV:  No pain, palpitations, hypo/hypertension  Resp:  No dyspnea, cough, tachypnea, wheezing  GI:  See HPI  :  No pain, bleeding, incontinence, nocturia  Muscle:  No pain, weakness  Neuro:  No weakness, tingling, memory problems  Psych:  No fatigue, insomnia, mood problems, depression  Endocrine:  No polyuria, polydipsia, cold/heat intolerance  Heme:  No petechiae, ecchymosis, easy bruisability  Skin:  No rash, edema      PHYSICAL EXAM:     GENERAL:  Appears stated age, well-groomed, well-nourished, no distress  HEENT:  NC/AT,  conjunctivae clear and pink,  no JVD  CHEST:  Full & symmetric excursion, no increased effort, breath sounds clear  HEART:  Regular rhythm, S1, S2, no murmur  ABDOMEN:  Soft, non-tender, non-distended, normoactive bowel sounds,  no masses ,  EXTREMITIES:  no cyanosis,clubbing or edema  SKIN:  No rash/erythema  NEURO:  Alert, oriented    Vital Signs:  Vital Signs Last 24 Hrs  T(C): 36.5 (2019 04:55), Max: 36.8 (2019 21:27)  T(F): 97.7 (2019 04:55), Max: 98.2 (2019 21:27)  HR: 60 (2019 04:55) (60 - 62)  BP: 162/59 (2019 04:55) (105/67 - 162/59)  BP(mean): --  RR: 18 (2019 04:55) (16 - 20)  SpO2: 95% (2019 04:55) (95% - 99%)  Daily Height in cm: 182.88 (2019 21:27)    Daily Weight in k.4 (2019 21:27)    LABS:                        7.5    11.56 )-----------( 292      ( 2019 09:19 )             24.1     04-21    139  |  104  |  37<H>  ----------------------------<  170<H>  4.1   |  21<L>  |  1.35<H>    Ca    8.9      2019 06:35    TPro  7.8  /  Alb  4.4  /  TBili  0.2  /  DBili  x   /  AST  21  /  ALT  15  /  AlkPhos  96  04-20    LIVER FUNCTIONS - ( 2019 17:08 )  Alb: 4.4 g/dL / Pro: 7.8 g/dL / ALK PHOS: 96 U/L / ALT: 15 U/L / AST: 21 U/L / GGT: x           PT/INR - ( 2019 17:08 )   PT: 14.3 sec;   INR: 1.24 ratio         PTT - ( 2019 17:08 )  PTT:30.8 sec  Urinalysis Basic - ( 2019 17:17 )    Color: Light Yellow / Appearance: Clear / S.025 / pH: x  Gluc: x / Ketone: Negative  / Bili: Negative / Urobili: Negative   Blood: x / Protein: Negative / Nitrite: Negative   Leuk Esterase: Negative / RBC: x / WBC x   Sq Epi: x / Non Sq Epi: x / Bacteria: x      Imaging:    < from: Transthoracic Echocardiogram w/Doppler (13 @ 11:28) >  Conclusions:  1. Normal mitral valve. Minimal mitral regurgitation.  2. Aortic valve leaflet morphology not well visualized.  The valve is calcified. Peak transaortic valve gradient  equals 54 mm Hg, mean transaortic valve gradient equals 37  mm Hg, consistent with at least moderate aortic stenosis.  However, unable to accurately estimate the aortic valve  area.  3. Normal left ventricular internal dimensions and wall  thicknesses.  4. Endocardium not well visualized; grossly normal left  ventricular systolic function.  5. Normal diastolic function  6. The right ventricle is not well visualized; grossly  normalright ventricular systolic function.  7. Estimated right ventricular systolic pressure equals 40  mm Hg, assuming right atrial pressure equals 10 mm Hg,  consistent with mild pulmonary hypertension.  Recommend RAJAT for further evaluation of the aorticvalve,  if clinically indicated.    < end of copied text >

## 2019-04-22 ENCOUNTER — TRANSCRIPTION ENCOUNTER (OUTPATIENT)
Age: 64
End: 2019-04-22

## 2019-04-22 ENCOUNTER — RESULT REVIEW (OUTPATIENT)
Age: 64
End: 2019-04-22

## 2019-04-22 DIAGNOSIS — I35.0 NONRHEUMATIC AORTIC (VALVE) STENOSIS: ICD-10-CM

## 2019-04-22 LAB
ANION GAP SERPL CALC-SCNC: 10 MMOL/L — SIGNIFICANT CHANGE UP (ref 5–17)
BUN SERPL-MCNC: 24 MG/DL — HIGH (ref 7–23)
CALCIUM SERPL-MCNC: 8.7 MG/DL — SIGNIFICANT CHANGE UP (ref 8.4–10.5)
CHLORIDE SERPL-SCNC: 105 MMOL/L — SIGNIFICANT CHANGE UP (ref 96–108)
CO2 SERPL-SCNC: 24 MMOL/L — SIGNIFICANT CHANGE UP (ref 22–31)
CREAT SERPL-MCNC: 1.05 MG/DL — SIGNIFICANT CHANGE UP (ref 0.5–1.3)
GLUCOSE SERPL-MCNC: 100 MG/DL — HIGH (ref 70–99)
HCT VFR BLD CALC: 27.2 % — LOW (ref 39–50)
HCT VFR BLD CALC: 27.3 % — LOW (ref 39–50)
HGB BLD-MCNC: 8.4 G/DL — LOW (ref 13–17)
HGB BLD-MCNC: 8.8 G/DL — LOW (ref 13–17)
MCHC RBC-ENTMCNC: 26.4 PG — LOW (ref 27–34)
MCHC RBC-ENTMCNC: 26.9 PG — LOW (ref 27–34)
MCHC RBC-ENTMCNC: 30.9 GM/DL — LOW (ref 32–36)
MCHC RBC-ENTMCNC: 32.2 GM/DL — SIGNIFICANT CHANGE UP (ref 32–36)
MCV RBC AUTO: 83.4 FL — SIGNIFICANT CHANGE UP (ref 80–100)
MCV RBC AUTO: 85.5 FL — SIGNIFICANT CHANGE UP (ref 80–100)
PLATELET # BLD AUTO: 275 K/UL — SIGNIFICANT CHANGE UP (ref 150–400)
PLATELET # BLD AUTO: 322 K/UL — SIGNIFICANT CHANGE UP (ref 150–400)
POTASSIUM SERPL-MCNC: 4 MMOL/L — SIGNIFICANT CHANGE UP (ref 3.5–5.3)
POTASSIUM SERPL-SCNC: 4 MMOL/L — SIGNIFICANT CHANGE UP (ref 3.5–5.3)
RBC # BLD: 3.18 M/UL — LOW (ref 4.2–5.8)
RBC # BLD: 3.27 M/UL — LOW (ref 4.2–5.8)
RBC # FLD: 12.9 % — SIGNIFICANT CHANGE UP (ref 10.3–14.5)
RBC # FLD: 13.2 % — SIGNIFICANT CHANGE UP (ref 10.3–14.5)
SODIUM SERPL-SCNC: 139 MMOL/L — SIGNIFICANT CHANGE UP (ref 135–145)
WBC # BLD: 11.9 K/UL — HIGH (ref 3.8–10.5)
WBC # BLD: 15.47 K/UL — HIGH (ref 3.8–10.5)
WBC # FLD AUTO: 11.9 K/UL — HIGH (ref 3.8–10.5)
WBC # FLD AUTO: 15.47 K/UL — HIGH (ref 3.8–10.5)

## 2019-04-22 PROCEDURE — 99223 1ST HOSP IP/OBS HIGH 75: CPT

## 2019-04-22 PROCEDURE — 99233 SBSQ HOSP IP/OBS HIGH 50: CPT

## 2019-04-22 PROCEDURE — 99223 1ST HOSP IP/OBS HIGH 75: CPT | Mod: GC,25

## 2019-04-22 PROCEDURE — 43239 EGD BIOPSY SINGLE/MULTIPLE: CPT | Mod: GC

## 2019-04-22 PROCEDURE — 88305 TISSUE EXAM BY PATHOLOGIST: CPT | Mod: 26

## 2019-04-22 PROCEDURE — 88312 SPECIAL STAINS GROUP 1: CPT | Mod: 26

## 2019-04-22 RX ADMIN — Medication 2: at 22:11

## 2019-04-22 RX ADMIN — Medication 40 MILLIGRAM(S): at 22:06

## 2019-04-22 RX ADMIN — Medication 2: at 11:44

## 2019-04-22 RX ADMIN — PANTOPRAZOLE SODIUM 40 MILLIGRAM(S): 20 TABLET, DELAYED RELEASE ORAL at 17:30

## 2019-04-22 RX ADMIN — INSULIN GLARGINE 15 UNIT(S): 100 INJECTION, SOLUTION SUBCUTANEOUS at 22:10

## 2019-04-22 RX ADMIN — PANTOPRAZOLE SODIUM 40 MILLIGRAM(S): 20 TABLET, DELAYED RELEASE ORAL at 05:19

## 2019-04-22 RX ADMIN — Medication 6: at 17:29

## 2019-04-22 NOTE — PRE-ANESTHESIA EVALUATION ADULT - NSANTHOSAYNRD_GEN_A_CORE
No. LENNOX screening performed.  STOP BANG Legend: 0-2 = LOW Risk; 3-4 = INTERMEDIATE Risk; 5-8 = HIGH Risk

## 2019-04-22 NOTE — CONSULT NOTE ADULT - SUBJECTIVE AND OBJECTIVE BOX
Cardiology Attending Consult Note      CHIEF COMPLAINT/REASON FOR CONSULT: pre-op risk stratification   Date of Admission: 04-20-19    HISTORY OF PRESENT ILLNESS:    64y.o. Male with DM2, HTN, s/p Right arm fracture (2013, takes ibuprofen for physical therapy), CHB s/p PPM ([facility] [employer] [id number], implanted 02/28/2013, last interrogation 03/05/2019 without events, patient with persistent AFL since 09/21/2018), Persistent Atrial Flutter (on apixaban), TTE 02/27/2013 (Normal LV/RV function, unable to arcuately assess aortic valve, gradients suggestive of at least moderate AS, mild pHTN), now admitted with black stools x 2 days, +FOBT, anemia (hgb 7.5) concerning for acute UGIB. Cardiology consulted for pre-op risk stratification in anticipation of urgent EGD. He denies Chest Pain. No SOB. No HA. +Intermittent Dizziness and nausea. No Palpitations. No V/D/F/C.      EKG with underlying AFL with variable block, paced rhythm@60      ALLERGIES:  pollen (Rhinitis; Rhinorrhea; Eye Irritation)    Home Medications:  aspirin 81 mg oral delayed release tablet: 1 tab(s) orally once a day (20 Apr 2019 20:58)  Eliquis 5 mg oral tablet: 1 tab(s) orally 2 times a day  Note: per pt may be held as of 4/20/19 (20 Apr 2019 20:58)  gemfibrozil 600 mg oral tablet: 1 tab(s) orally 2 times a day (20 Apr 2019 20:58)  glimepiride 4 mg oral tablet: 1 tab(s) orally once a day (20 Apr 2019 20:58)  Jardiance 25 mg oral tablet: 1 tab(s) orally once a day (20 Apr 2019 20:58)  lisinopril 20 mg oral tablet: 1 tab(s) orally once a day (20 Apr 2019 20:58)  metFORMIN 500 mg oral tablet: 1 tab(s) orally 2 times a day (20 Apr 2019 20:58)  multivitamin: 1 tab(s) orally once a day (20 Apr 2019 20:58)  PARoxetine 40 mg oral tablet: 1 tab(s) orally once a day (20 Apr 2019 20:58)  Toujeo SoloStar 300 units/mL subcutaneous solution: 30 unit(s) subcutaneous once a day (at bedtime) (20 Apr 2019 20:58)      MEDICATIONS:        PARoxetine 40 milliGRAM(s) Oral daily    pantoprazole  Injectable 40 milliGRAM(s) IV Push every 12 hours    dextrose 50% Injectable 12.5 Gram(s) IV Push once  dextrose 50% Injectable 25 Gram(s) IV Push once  dextrose 50% Injectable 25 Gram(s) IV Push once  insulin glargine Injectable (LANTUS) 15 Unit(s) SubCutaneous at bedtime  insulin lispro (HumaLOG) corrective regimen sliding scale   SubCutaneous three times a day before meals  insulin lispro (HumaLOG) corrective regimen sliding scale   SubCutaneous at bedtime    dextrose 5%. 1000 milliLiter(s) IV Continuous <Continuous>  influenza   Vaccine 0.5 milliLiter(s) IntraMuscular once      PAST MEDICAL & SURGICAL HISTORY:  Arrhythmia as indication for cardiac pacemaker replacement  HLD (hyperlipidemia)  Depression  Diabetes mellitus type 2 in obese  HTN (hypertension)  Diabetes mellitus type II, controlled, with no complications  Dyslipidemia  HTN (hypertension)  No significant past surgical history      FAMILY HISTORY:  No pertinent family history in first degree relatives      SOCIAL HISTORY:    Never smoked, no ETOH, no IVDU    REVIEW OF SYSTEMS:    CONSTITUTIONAL: No weakness, fevers or chills  EYES/ENT: No visual changes;  No vertigo or throat pain   NECK: No pain or stiffness  RESPIRATORY: No cough, wheezing, hemoptysis; No shortness of breath  CARDIOVASCULAR: No chest pain or palpitations  GASTROINTESTINAL: No abdominal or epigastric pain. No nausea, vomiting, or hematemesis; No diarrhea or constipation. No melena or hematochezia.  GENITOURINARY: No dysuria, frequency or hematuria  NEUROLOGICAL: No numbness or weakness  SKIN: No itching, burning, rashes, or lesions   All other review of systems is negative unless indicated above.    PHYSICAL EXAM:  T(C): 36.9 (04-22-19 @ 00:15), Max: 37.4 (04-21-19 @ 20:55)  HR: 60 (04-22-19 @ 00:15) (60 - 60)  BP: 111/42 (04-22-19 @ 00:15) (111/42 - 174/65)  RR: 20 (04-22-19 @ 00:15) (18 - 20)  SpO2: 96% (04-22-19 @ 00:15) (95% - 99%)  Wt(kg): --    Drug Dosing Weight  Height (cm): 182.88 (20 Apr 2019 21:27)  Weight (kg): 100.4 (20 Apr 2019 21:27)  BMI (kg/m2): 30 (20 Apr 2019 21:27)  BSA (m2): 2.22 (20 Apr 2019 21:27)    I&O's Summary    20 Apr 2019 07:01  -  21 Apr 2019 07:00  --------------------------------------------------------  IN: 360 mL / OUT: 0 mL / NET: 360 mL    21 Apr 2019 07:01  -  22 Apr 2019 00:45  --------------------------------------------------------  IN: 1160 mL / OUT: 0 mL / NET: 1160 mL        Appearance: Normal	  HEENT:   Normal oral mucosa, PERRL, EOMI	  Lymphatic: No lymphadenopathy  Cardiovascular: Normal S1 S2, No JVD, No murmurs  Respiratory: Lungs clear to auscultation	  Psychiatry: A & O x 3, Mood & affect appropriate  Gastrointestinal:  Soft, Non-tender, + BS	  Skin: No rashes, No ecchymoses, No cyanosis	  Neurologic: Non-focal  Extremities: Normal range of motion, No clubbing, cyanosis or edema  Vascular: Peripheral pulses palpable 2+ bilaterally    LABS:	 	    CBC Full  -  ( 21 Apr 2019 16:24 )  WBC Count : 13.0 K/uL  Hemoglobin : 7.8 g/dL  Hematocrit : 23.0 %  Platelet Count - Automated : 262 K/uL  Mean Cell Volume : 83.1 fl  Mean Cell Hemoglobin : 28.3 pg  Mean Cell Hemoglobin Concentration : 34.1 gm/dL  Auto Neutrophil # : x  Auto Lymphocyte # : x  Auto Monocyte # : x  Auto Eosinophil # : x  Auto Basophil # : x  Auto Neutrophil % : x  Auto Lymphocyte % : x  Auto Monocyte % : x  Auto Eosinophil % : x  Auto Basophil % : x    04-21    139  |  104  |  37<H>  ----------------------------<  170<H>  4.1   |  21<L>  |  1.35<H>  04-20    137  |  100  |  43<H>  ----------------------------<  254<H>  4.2   |  20<L>  |  1.35<H>    Ca    8.9      21 Apr 2019 06:35  Ca    9.5      20 Apr 2019 17:08    TPro  7.8  /  Alb  4.4  /  TBili  0.2  /  DBili  x   /  AST  21  /  ALT  15  /  AlkPhos  96  04-20    PT/INR - ( 20 Apr 2019 17:08 )   PT: 14.3 sec;   INR: 1.24 ratio         PTT - ( 20 Apr 2019 17:08 )  PTT:30.8 sec  LIVER FUNCTIONS - ( 20 Apr 2019 17:08 )  Alb: 4.4 g/dL / Pro: 7.8 g/dL / ALK PHOS: 96 U/L / ALT: 15 U/L / AST: 21 U/L / GGT: x           Hemoglobin A1C, Whole Blood: 9.8 % (04-21 @ 09:19)        EKG: EKG with underlying AFL with variable block, paced rhythm@60    Telemetry: AFL, paced rhythm    CXR: None     TTE: 02/27/2013:  Ejection Fraction: 62 %  ------------------------------------------------------------------------  Observations:  Mitral Valve: Normal mitral valve. Minimal mitral  regurgitation.  Aortic Valve/Aorta: Aortic valve leaflet morphology not  well visualized.  The valve is calcified. Peak transaortic  valve gradient equals 54 mm Hg, mean transaortic valve  gradient equals 37 mm Hg, consistent with at least moderate  aortic stenosis.  Normal aortic root.  Left Ventricle: Endocardium not well visualized; grossly  normal left ventricular systolic function. Normal left  ventricular internal dimensions and wall thicknesses.  Normal diastolic function  Right Heart: Normal right atrium. The right ventricle is  not well visualized; grossly normal right ventricular  systolic function. Normal tricuspid valve.  Minimal  tricuspid regurgitation. Normal pulmonic valve. Minimal  pulmonic regurgitation.  Pericardium/Pleura: Normal pericardium with no pericardial  effusion.  Hemodynamic: Estimated right atrial pressure is 10 mm Hg.  Estimated right ventricular systolic pressure equals 40 mm  Hg, assuming right atrial pressure equals 10 mm Hg,  consistent with mild pulmonary hypertension.  ------------------------------------------------------------------------  Conclusions:  1. Normal mitral valve. Minimal mitral regurgitation.  2. Aortic valve leaflet morphology not well visualized.  The valve is calcified. Peak transaortic valve gradient  equals 54 mm Hg, mean transaortic valve gradient equals 37  mm Hg, consistent with at least moderate aortic stenosis.  However, unable to accurately estimate the aortic valve  area.  3. Normal left ventricular internal dimensions and wall  thicknesses.  4. Endocardium not well visualized; grossly normal left  ventricular systolic function.  5. Normal diastolic function  6. The right ventricle is not well visualized; grossly  normal right ventricular systolic function.  7. Estimated right ventricular systolic pressure equals 40  mm Hg, assuming right atrial pressure equals 10 mm Hg,  consistent with mild pulmonary hypertension.  Recommend RAJAT for further evaluation of the aortic valve,  if clinically indicated.    [facility]: 01/30/2012:  Valves: Aortic valve: There was moderate aortic stenosis.  Coronary vessels: The coronary circulation is right dominant.  LM:      LM: Normal.  LAD:      D1: There was a 30 % stenosis.  CX:      Circumflex: Normal.  RCA:      Mid RCA: Angiography showed mild atherosclerosis with no flow  limiting lesions.  Complications: There were no complications.      A/P:  64y.o. Male with DM2, HTN, s/p Right arm fracture (2013, takes ibuprofen for physical therapy), CHB s/p PPM ([facility] [id number], implanted 02/28/2013, last interrogation 03/05/2019 without events, patient with persistent AFL since 09/21/2018), Persistent Atrial Flutter (on apixaban), TTE 02/27/2013 (Normal LV/RV function, unable to arcuately assess aortic valve, gradients suggestive of at least moderate AS, mild pHTN), now admitted with black stools x 2 days, +FOBT, anemia (hgb 7.5) concerning for acute UGIB. Cardiology consulted for pre-op risk stratification in anticipation of urgent EGD. He denies Chest Pain. No SOB. No HA. +Intermittent Dizziness and nausea. No Palpitations. No V/D/F/C.    1. Cardiovascular Risk Stratification - RCRI = 3 (CAD, IDDM, Aortic Stenosis ).  -Patient with significant uncorrected aortic stenosis. No recent Chest pain or SOB. >4METS exercise tolerance.   -Most recent [facility] 2012 non-obstructive  -Active arrythmia: Underlying Atrial Flutter (persistent since 09/2018), PPM dependent 2/2 complete heart block.  - Overall this patient is at high risk (>11%) for cardiac death, nonfatal myocardial infarction, and nonfatal cardiac arrest perioperatively for this low to moderate risk procedure (EGD)  -Given the urgent need for this procedure (EGD in the setting of active GIB with anemia requiring transfusion), would not delay procedure as the risk of delaying the EGD likely outweighs the benefit of further cardiac for further cardiac diagnostic or interventional procedures.  -Please order routine TTE, although would not delay EGD For TTE As noted above.  -Presumed severe aortic stenosis given expected progression in AS from last TTE in 2013.  -If possible, avoid general anesthesia as negative inotropy may decrease cardiac output.  -Consider cardiac anesthesia with hemodynamic monitoring (A-Line/[place] placement)  -No further diagnostic or interventional procedures are required prior to the planned procedure.    2. AFL, CHB s/p PPM -   -Hold Apixaban in the setting of GIB  -[id number] = 3 (HTN, DM2, CAD). Overall he's at moderate risk for thromboembolic events.  - last interrogation 03/05/2019 without events, patient with persistent AFL since 09/21/2018)  -Follows with Dr. Woodall for EPS    3. CAD - Last [facility] 2012 non-obstructive (only minor luminal irregularities at that time)  -Hold ASA in the setting of GIB    4. HTN - BP Controlled  -Hold ACE in the setting of GIB, concern for hypotension.     Thank you for this interesting consult.     Jamie Hernandez MD  Cardiology Attending  [facility]  Cell: [phone]  (Cardiology Nocturnist cell number available 7 pm - 7 am every night; available daytime week days for follow-up only; daytime weekends covered by general cardiology consult service)

## 2019-04-22 NOTE — PROGRESS NOTE ADULT - PROBLEM SELECTOR PLAN 8
-DVT prophylaxis with SCDs (holding home apixaban in setting of GIB)  -Dash diet/Diabetic diet  -CODE STATUS: Full code per previous discussion with patient and wife

## 2019-04-22 NOTE — DISCHARGE NOTE NURSING/CASE MANAGEMENT/SOCIAL WORK - NSDCPEWEB_GEN_ALL_CORE
[place] website --- [email or web address] for Tobacco Control website --- [email or web address]

## 2019-04-22 NOTE — PROGRESS NOTE ADULT - SUBJECTIVE AND OBJECTIVE BOX
Pre-Endoscopy Evaluation      Referring Physician: dr. liliana tavarez                                   Procedure:  upper gastrointestinal endoscopy     Indication for Procedure: gib    Pertinent History: 64y male with T2DM, HTN, anxiety, Atrial flutter (on apixaban), moderate aortic stenosis, right arm fracture (2013, takes ibuprofen once per week prior to physical therapy), s/p PPM (2013, last interrogated 03/19), GERD who presented to the ER with chief complain of black, tarry stools, acute blood loss anemia in the setting of NSAID use        Sedation by Anesthesia [X]    PAST MEDICAL & SURGICAL HISTORY:  Arrhythmia as indication for cardiac pacemaker replacement  HLD (hyperlipidemia)  Depression  Diabetes mellitus type 2 in obese  HTN (hypertension)  Dyslipidemia  HTN (hypertension)  No significant past surgical history      PMH of Gastroparesis [ ]  Gastric Surgery [ ]  Gastric Outlet Obstruction [ ]    Allergies;    No Known Drug Allergies  pollen (Rhinitis; Rhinorrhea; Eye Irritation)    Intolerances:    Latex allergy: [ ] yes [X] no    Medications:MEDICATIONS  (STANDING):  dextrose 5%. 1000 milliLiter(s) (50 mL/Hr) IV Continuous <Continuous>  dextrose 50% Injectable 12.5 Gram(s) IV Push once  dextrose 50% Injectable 25 Gram(s) IV Push once  dextrose 50% Injectable 25 Gram(s) IV Push once  influenza   Vaccine 0.5 milliLiter(s) IntraMuscular once  insulin glargine Injectable (LANTUS) 15 Unit(s) SubCutaneous at bedtime  insulin lispro (HumaLOG) corrective regimen sliding scale   SubCutaneous three times a day before meals  insulin lispro (HumaLOG) corrective regimen sliding scale   SubCutaneous at bedtime  pantoprazole  Injectable 40 milliGRAM(s) IV Push every 12 hours  PARoxetine 40 milliGRAM(s) Oral daily    MEDICATIONS  (PRN):  dextrose 40% Gel 15 Gram(s) Oral once PRN Blood Glucose LESS THAN 70 milliGRAM(s)/deciliter  glucagon  Injectable 1 milliGRAM(s) IntraMuscular once PRN Glucose LESS THAN 70 milligrams/deciliter      Smoking: [ ] yes  [X] no    AICD/PPM: [ ] yes   [X] no    Pertinent lab data:                        8.8    11.9  )-----------( 275      ( 22 Apr 2019 05:30 )             27.3     04-22    139  |  105  |  24<H>  ----------------------------<  100<H>  4.0   |  24  |  1.05    Ca    8.7      22 Apr 2019 05:30    TPro  7.8  /  Alb  4.4  /  TBili  0.2  /  DBili  x   /  AST  21  /  ALT  15  /  AlkPhos  96  04-20    PT/INR - ( 20 Apr 2019 17:08 )   PT: 14.3 sec;   INR: 1.24 ratio         PTT - ( 20 Apr 2019 17:08 )  PTT:30.8 sec        CAPILLARY BLOOD GLUCOSE  POCT Blood Glucose.: 107 mg/dL (22 Apr 2019 07:33)      < from: Transthoracic Echocardiogram w/Doppler (02.27.13 @ 11:28) >  Conclusions:  1. Normal mitral valve. Minimal mitral regurgitation.  2. Aortic valve leaflet morphology not well visualized.  The valve is calcified. Peak transaortic valve gradient  equals 54 mm Hg, mean transaortic valve gradient equals 37  mm Hg, consistent with at least moderate aortic stenosis.  However, unable to accurately estimate the aortic valve  area.  3. Normal left ventricular internal dimensions and wall  thicknesses.  4. Endocardium not well visualized; grossly normal left  ventricular systolic function.  5. Normal diastolic function  6. The right ventricle is not well visualized; grossly  normalright ventricular systolic function.  7. Estimated right ventricular systolic pressure equals 40  mm Hg, assuming right atrial pressure equals 10 mm Hg,  consistent with mild pulmonary hypertension.  Recommend RAJAT for further evaluation of the aorticvalve,  if clinically indicated.    < end of copied text >      Physical Examination:       Daily   Vital Signs Last 24 Hrs  T(C): 36.8 (22 Apr 2019 06:00), Max: 37.4 (21 Apr 2019 20:55)  T(F): 98.2 (22 Apr 2019 06:00), Max: 99.4 (21 Apr 2019 20:55)  HR: 60 (22 Apr 2019 06:00) (60 - 60)  BP: 153/72 (22 Apr 2019 06:00) (111/42 - 174/65)  BP(mean): --  RR: 19 (22 Apr 2019 06:00) (19 - 20)  SpO2: 97% (22 Apr 2019 06:00) (96% - 99%)    Drug Dosing Weight  Height (cm): 182.88 (20 Apr 2019 21:27)  Weight (kg): 100.4 (20 Apr 2019 21:27)  BMI (kg/m2): 30 (20 Apr 2019 21:27)  BSA (m2): 2.22 (20 Apr 2019 21:27)    Constitutional: NAD     Neck:  No JVD    Respiratory: CTAB/L    Cardiovascular: S1 and S2    Gastrointestinal: BS+, soft, NT/ND    Extremities: No peripheral edema    Neurological: A/O x 3    : No [place]    Skin: No rashes    Comments:    ASA Class: I [ ]  II [ ]  III [x]  IV [ ]    The patient is a suitable candidate for the planned procedure unless box checked [ ]  No, explain:

## 2019-04-22 NOTE — PROGRESS NOTE ADULT - SUBJECTIVE AND OBJECTIVE BOX
Patient is a 64y old  Male who presents with a chief complaint of Melena (2019 08:54)      INTERVAL HPI/OVERNIGHT EVENTS: Went for EGD this morning. Denies any additional bowel movements       Gastrointestinal hemorrhage  No pertinent family history in first degree relatives  Handoff  MEWS Score  Arrhythmia as indication for cardiac pacemaker replacement  HLD (hyperlipidemia)  Depression  Diabetes mellitus type 2 in obese  HTN (hypertension)  Diabetes mellitus type II, controlled, with no complications  Diabetes mellitus type II  Dyslipidemia  HTN (hypertension)  GI bleed  Atrial flutter  Need for prophylactic measure  Anxiety  HTN (hypertension)  Type 2 diabetes mellitus  Anemia due to blood loss  GI bleed  No significant past surgical history  RECTAL BLEED  90+      Review of Systems: 12 point review of systems otherwise negative  ( - )fevers/chills  ( - ) dyspnea  ( - ) cough  ( - ) chest pain  ( - ) palpatations  ( - ) dizziness/lightheadedness  ( - ) nausea/vomiting  ( - ) abd pain  ( - ) diarrhea  ( - ) melena  ( - ) hematochezia  ( - ) dysuria  ( - ) hematuria  ( - ) leg swelling  ( -) calf tenderness  ( - ) motor weakness  ( - ) extremity numbness  ( - ) back pain  ( + ) tolerating POs  ( + ) BM    MEDICATIONS  (STANDING):  dextrose 5%. 1000 milliLiter(s) (50 mL/Hr) IV Continuous <Continuous>  dextrose 50% Injectable 12.5 Gram(s) IV Push once  dextrose 50% Injectable 25 Gram(s) IV Push once  dextrose 50% Injectable 25 Gram(s) IV Push once  influenza   Vaccine 0.5 milliLiter(s) IntraMuscular once  insulin glargine Injectable (LANTUS) 15 Unit(s) SubCutaneous at bedtime  insulin lispro (HumaLOG) corrective regimen sliding scale   SubCutaneous three times a day before meals  insulin lispro (HumaLOG) corrective regimen sliding scale   SubCutaneous at bedtime  pantoprazole  Injectable 40 milliGRAM(s) IV Push every 12 hours  PARoxetine 40 milliGRAM(s) Oral daily    MEDICATIONS  (PRN):  dextrose 40% Gel 15 Gram(s) Oral once PRN Blood Glucose LESS THAN 70 milliGRAM(s)/deciliter  glucagon  Injectable 1 milliGRAM(s) IntraMuscular once PRN Glucose LESS THAN 70 milligrams/deciliter      Allergies    No Known Drug Allergies  pollen (Rhinitis; Rhinorrhea; Eye Irritation)    Intolerances          Vital Signs Last 24 Hrs  T(C): 36.9 (2019 12:06), Max: 37.4 (2019 20:55)  T(F): 98.4 (2019 12:06), Max: 99.4 (2019 20:55)  HR: 61 (2019 12:06) (60 - 61)  BP: 109/52 (2019 12:06) (109/52 - 174/65)  BP(mean): --  RR: 19 (2019 12:06) (19 - 20)  SpO2: 99% (2019 12:06) (96% - 99%)  CAPILLARY BLOOD GLUCOSE      POCT Blood Glucose.: 181 mg/dL (2019 11:44)  POCT Blood Glucose.: 107 mg/dL (2019 07:33)  POCT Blood Glucose.: 125 mg/dL (2019 23:45)  POCT Blood Glucose.: 125 mg/dL (2019 22:09)  POCT Blood Glucose.: 158 mg/dL (2019 17:24)       @ 07:01  -  - @ 07:00  --------------------------------------------------------  IN: 1160 mL / OUT: 0 mL / NET: 1160 mL        Physical Exam:    Daily Height in cm: 182.88 (2019 09:24)    Daily   General:  NAD  HEENT:  Nonicteric, PERRLA  CV:  RRR, no murmur  Lungs:  CTA B/L, no wheezes, rales, rhonchi  Abdomen:  Soft, non-tender, no distended, positive BS,  Extremities:  no edema  Skin:  Warm and dry, no rashes  :  No loera  Neuro:  AAOx3, non-focal  No Restraints    LABS:                        8.8    11.9  )-----------( 275      ( 2019 05:30 )             27.3         139  |  105  |  24<H>  ----------------------------<  100<H>  4.0   |  24  |  1.05    Ca    8.7      2019 05:30    TPro  7.8  /  Alb  4.4  /  TBili  0.2  /  DBili  x   /  AST  21  /  ALT  15  /  AlkPhos  96  04-20    PT/INR - ( 2019 17:08 )   PT: 14.3 sec;   INR: 1.24 ratio         PTT - ( 2019 17:08 )  PTT:30.8 sec  Urinalysis Basic - ( 2019 17:17 )    Color: Light Yellow / Appearance: Clear / S.025 / pH: x  Gluc: x / Ketone: Negative  / Bili: Negative / Urobili: Negative   Blood: x / Protein: Negative / Nitrite: Negative   Leuk Esterase: Negative / RBC: x / WBC x   Sq Epi: x / Non Sq Epi: x / Bacteria: x          RADIOLOGY & ADDITIONAL TESTS:    ---------------------------------------------------------------------------  I personally reviewed: [  ]EKG   [  ]CXR    [  ] CT    [  ]Other  ---------------------------------------------------------------------------  PLEASE CHECK WHEN PRESENT:     [  ]Heart Failure     [  ] Acute     [  ] Acute on Chronic     [  ] Chronic  -------------------------------------------------------------------     [  ]Diastolic [HFpEF]     [  ]Systolic [HFrEF]     [  ]Combined [HFpEF & HFrEF]     [  ]Other:  -------------------------------------------------------------------  [  ]MARGARITA     [  ]ATN     [  ]Reneal Medullary Necrosis     [  ]Renal Cortical Necrosis     [  ]Other Pathological Lesions:    [  ]CKD 1  [  ]CKD 2  [  ]CKD 3  [  ]CKD 4  [  ]CKD 5  [  ]Other  -------------------------------------------------------------------  [  ]Other/Unspecified:    --------------------------------------------------------------------    Abdominal Nutritional Status  [  ]Malnutrition: See Nutrition Note  [  ]Cachexia  [  ]Other:   [  ]Supplement Ordered:  [  ]Morbid Obesity (BMI >=40]

## 2019-04-22 NOTE — DISCHARGE NOTE NURSING/CASE MANAGEMENT/SOCIAL WORK - NSDCDPATPORTLINK_GEN_ALL_CORE
You can access the [facility] Portal, offered by [facility], by registering with the following website: [email or web address]

## 2019-04-22 NOTE — PROGRESS NOTE ADULT - ASSESSMENT
64 year old female with PMH of GERD, DM-2, HTN, anxiety, Atrial flutter (on apixaban), severe aortic stenosis, right arm fracture (2013, takes ibuprofen once per week prior to physical therapy), 3rd degree AV block s/p PPM (2013, last interrogated 03/19); admitted for melena and UGIB 2/2 gastritis found to have gastric nodule.

## 2019-04-23 LAB
HCT VFR BLD CALC: 23.9 % — LOW (ref 39–50)
HCT VFR BLD CALC: 26.4 % — LOW (ref 39–50)
HGB BLD-MCNC: 8 G/DL — LOW (ref 13–17)
HGB BLD-MCNC: 8 G/DL — LOW (ref 13–17)
MCHC RBC-ENTMCNC: 26.1 PG — LOW (ref 27–34)
MCHC RBC-ENTMCNC: 27.7 PG — SIGNIFICANT CHANGE UP (ref 27–34)
MCHC RBC-ENTMCNC: 30.3 GM/DL — LOW (ref 32–36)
MCHC RBC-ENTMCNC: 33.3 GM/DL — SIGNIFICANT CHANGE UP (ref 32–36)
MCV RBC AUTO: 83.2 FL — SIGNIFICANT CHANGE UP (ref 80–100)
MCV RBC AUTO: 86.3 FL — SIGNIFICANT CHANGE UP (ref 80–100)
PLATELET # BLD AUTO: 241 K/UL — SIGNIFICANT CHANGE UP (ref 150–400)
PLATELET # BLD AUTO: 299 K/UL — SIGNIFICANT CHANGE UP (ref 150–400)
RBC # BLD: 2.87 M/UL — LOW (ref 4.2–5.8)
RBC # BLD: 3.06 M/UL — LOW (ref 4.2–5.8)
RBC # FLD: 13.4 % — SIGNIFICANT CHANGE UP (ref 10.3–14.5)
RBC # FLD: 13.4 % — SIGNIFICANT CHANGE UP (ref 10.3–14.5)
WBC # BLD: 11 K/UL — HIGH (ref 3.8–10.5)
WBC # BLD: 12.64 K/UL — HIGH (ref 3.8–10.5)
WBC # FLD AUTO: 11 K/UL — HIGH (ref 3.8–10.5)
WBC # FLD AUTO: 12.64 K/UL — HIGH (ref 3.8–10.5)

## 2019-04-23 PROCEDURE — 99232 SBSQ HOSP IP/OBS MODERATE 35: CPT

## 2019-04-23 PROCEDURE — 99233 SBSQ HOSP IP/OBS HIGH 50: CPT

## 2019-04-23 PROCEDURE — 99232 SBSQ HOSP IP/OBS MODERATE 35: CPT | Mod: GC

## 2019-04-23 RX ORDER — SENNA PLUS 8.6 MG/1
2 TABLET ORAL AT BEDTIME
Qty: 0 | Refills: 0 | Status: DISCONTINUED | OUTPATIENT
Start: 2019-04-23 | End: 2019-04-24

## 2019-04-23 RX ORDER — INSULIN LISPRO 100/ML
4 VIAL (ML) SUBCUTANEOUS
Qty: 0 | Refills: 0 | Status: DISCONTINUED | OUTPATIENT
Start: 2019-04-23 | End: 2019-04-24

## 2019-04-23 RX ORDER — APIXABAN 2.5 MG/1
2.5 TABLET, FILM COATED ORAL EVERY 12 HOURS
Qty: 0 | Refills: 0 | Status: DISCONTINUED | OUTPATIENT
Start: 2019-04-23 | End: 2019-04-24

## 2019-04-23 RX ORDER — DOCUSATE SODIUM 100 MG
100 CAPSULE ORAL THREE TIMES A DAY
Qty: 0 | Refills: 0 | Status: DISCONTINUED | OUTPATIENT
Start: 2019-04-23 | End: 2019-04-24

## 2019-04-23 RX ORDER — POLYETHYLENE GLYCOL 3350 17 G/17G
17 POWDER, FOR SOLUTION ORAL DAILY
Qty: 0 | Refills: 0 | Status: DISCONTINUED | OUTPATIENT
Start: 2019-04-23 | End: 2019-04-23

## 2019-04-23 RX ORDER — PANTOPRAZOLE SODIUM 20 MG/1
40 TABLET, DELAYED RELEASE ORAL
Qty: 0 | Refills: 0 | Status: DISCONTINUED | OUTPATIENT
Start: 2019-04-23 | End: 2019-04-24

## 2019-04-23 RX ORDER — APIXABAN 2.5 MG/1
5 TABLET, FILM COATED ORAL EVERY 12 HOURS
Qty: 0 | Refills: 0 | Status: DISCONTINUED | OUTPATIENT
Start: 2019-04-23 | End: 2019-04-23

## 2019-04-23 RX ADMIN — POLYETHYLENE GLYCOL 3350 17 GRAM(S): 17 POWDER, FOR SOLUTION ORAL at 12:19

## 2019-04-23 RX ADMIN — Medication 4 UNIT(S): at 17:06

## 2019-04-23 RX ADMIN — Medication 4: at 17:06

## 2019-04-23 RX ADMIN — PANTOPRAZOLE SODIUM 40 MILLIGRAM(S): 20 TABLET, DELAYED RELEASE ORAL at 06:13

## 2019-04-23 RX ADMIN — Medication 40 MILLIGRAM(S): at 22:06

## 2019-04-23 RX ADMIN — SENNA PLUS 2 TABLET(S): 8.6 TABLET ORAL at 22:06

## 2019-04-23 RX ADMIN — Medication 100 MILLIGRAM(S): at 22:06

## 2019-04-23 RX ADMIN — APIXABAN 5 MILLIGRAM(S): 2.5 TABLET, FILM COATED ORAL at 14:49

## 2019-04-23 RX ADMIN — INSULIN GLARGINE 15 UNIT(S): 100 INJECTION, SOLUTION SUBCUTANEOUS at 22:05

## 2019-04-23 RX ADMIN — Medication 100 MILLIGRAM(S): at 12:23

## 2019-04-23 RX ADMIN — Medication 4: at 12:19

## 2019-04-23 NOTE — PHARMACOTHERAPY INTERVENTION NOTE - COMMENTS
65 yo M with DM A1C 9.8 currently on lantus 15 units subcutaneously at bedtime and mod dose correctional scale. BG in past 24 hours were 144, 258, 268, and 181. Recommendation made to add humalog 4 units subcutaneously three times daily.    Dheeraj Barksdale, PharmD  [phone]

## 2019-04-23 NOTE — DIETITIAN INITIAL EVALUATION ADULT. - ADHERENCE
poor/Pt reports not following any type of diet or restriction at home; diet recall indicates high carbohydrate and salt intake and admits to consume fast food. Reports taking Multivitamin PTA. Denies monitoring BG and states taking Glimepiride, Toujeo, Jardiance, and Metformin at home; HbA1c per chart (04/21) 9.8% - indicates poor BG control. Pt reports having DM for more than 20 years and states not doing changes as BG has always been the same and he has "no medical complications"; states following up with an outpatient dietitian every 6 months.

## 2019-04-23 NOTE — DIETITIAN INITIAL EVALUATION ADULT. - OTHER INFO
Nutrition consult received for HbA1c 9.8%. Pt previously on clear liquid diet x 1 day for EGD - states requesting to advance diet immediately after EGD due to being "extremely hungry". Pt reports good appetite and PO intake. Noted 100% PO intake as per flow sheets. Denies difficulty chewing/swallowing. Pt denies nausea, vomiting, diarrhea, or constipation, last BM 4 days ago (04/19) - pt on bowel regimen as per chart.

## 2019-04-23 NOTE — DIETITIAN INITIAL EVALUATION ADULT. - PROBLEM SELECTOR PLAN 3
-On insulin 30 QHS, metformin, Empagliflozin (jardiance), glimipiride as outpt. Will hold PO diabetes meds for now.   -C/w insulin 30 QHS, moderate ISS. A1c AM. Unclear why patient is not on statin.

## 2019-04-23 NOTE — DIETITIAN INITIAL EVALUATION ADULT. - NS FNS WEIGHT CHANGE REASON
Pt reports 10 pounds weight loss x 3 months due to starting walking "after winter", from 232 to 222 pounds; pt states feeling happy with the weight loss. Weight as per flow sheets (04/20) 221.3 pounds - will continue to monitor.

## 2019-04-23 NOTE — DIETITIAN INITIAL EVALUATION ADULT. - PROBLEM SELECTOR PLAN 1
-Likely UGIB given hx of melena, guaiac +. Maybe 2/2 PUD vs GERD/gastritis vs malignancy (hx of smoking, unintentional weight loss, night sweats) vs AVM from aortic stenosis. Pt taking ibuprofen once per week chronically. On apixaban and ASA daily.  -received pantoprazole 40 in ED. Will give another 40 now and continue w 40 IV BID  -Hb 9, vitals jorje. GI consulted via email. Likely EGD next week.   -hold apixaban and ASA 81.   -Will trend CBC daily, maintain active type and screen.  -monitor for melena with stool

## 2019-04-23 NOTE — PROGRESS NOTE ADULT - SUBJECTIVE AND OBJECTIVE BOX
Chief Complaint:  Patient is a 64y old  Male who presents with a chief complaint of Melena (22 Apr 2019 12:54)      Interval Events: Hgb is 8.0 this morning, no reported GI bleed.   ROS: All 12 point system except listed above were otherwise negative.    Allergies:  No Known Drug Allergies  pollen (Rhinitis; Rhinorrhea; Eye Irritation)        Hospital Medications:  dextrose 40% Gel 15 Gram(s) Oral once PRN  dextrose 5%. 1000 milliLiter(s) IV Continuous <Continuous>  dextrose 50% Injectable 12.5 Gram(s) IV Push once  dextrose 50% Injectable 25 Gram(s) IV Push once  dextrose 50% Injectable 25 Gram(s) IV Push once  docusate sodium 100 milliGRAM(s) Oral three times a day  glucagon  Injectable 1 milliGRAM(s) IntraMuscular once PRN  influenza   Vaccine 0.5 milliLiter(s) IntraMuscular once  insulin glargine Injectable (LANTUS) 15 Unit(s) SubCutaneous at bedtime  insulin lispro (HumaLOG) corrective regimen sliding scale   SubCutaneous three times a day before meals  insulin lispro (HumaLOG) corrective regimen sliding scale   SubCutaneous at bedtime  insulin lispro Injectable (HumaLOG) 4 Unit(s) SubCutaneous three times a day before meals  pantoprazole  Injectable 40 milliGRAM(s) IV Push every 12 hours  PARoxetine 40 milliGRAM(s) Oral daily  polyethylene glycol 3350 17 Gram(s) Oral daily  senna 2 Tablet(s) Oral at bedtime      PMHX/PSHX:  Arrhythmia as indication for cardiac pacemaker replacement  HLD (hyperlipidemia)  Depression  Diabetes mellitus type 2 in obese  HTN (hypertension)  Diabetes mellitus type II, controlled, with no complications  Diabetes mellitus type II  Dyslipidemia  HTN (hypertension)  No significant past surgical history      Family history:  No pertinent family history in first degree relatives    There is no family history of peptic ulcer disease, gastric cancer, colon polyps, colon cancer, celiac disease, biliary, hepatic, or pancreatic disease.  None of the female relatives have breast, uterine, or ovarian cancer.     PHYSICAL EXAM:   Vital Signs:  Vital Signs Last 24 Hrs  T(C): 37 (23 Apr 2019 04:29), Max: 37.4 (22 Apr 2019 20:07)  T(F): 98.6 (23 Apr 2019 04:29), Max: 99.4 (22 Apr 2019 20:07)  HR: 59 (23 Apr 2019 04:29) (59 - 61)  BP: 144/70 (23 Apr 2019 04:29) (132/56 - 154/75)  BP(mean): --  RR: 18 (23 Apr 2019 04:29) (18 - 18)  SpO2: 96% (23 Apr 2019 04:29) (94% - 96%)  Daily     Daily       PHYSICAL EXAM:     GENERAL:  Appears stated age, well-groomed, well-nourished, no distress  HEENT:  NC/AT,  conjunctivae clear and pink,  no JVD  CHEST:  Full & symmetric excursion, no increased effort, breath sounds clear  HEART:  Regular rhythm, S1, S2, no murmur  ABDOMEN:  Soft, non-tender, non-distended, normoactive bowel sounds,  no masses ,  EXTREMITIES:  no cyanosis,clubbing or edema  SKIN:  No rash/erythema  NEURO:  Alert, oriented  LABS:                        8.0    11.0  )-----------( 241      ( 23 Apr 2019 05:11 )             23.9     Mean Cell Volume: 83.2 fl (04-23-19 @ 05:11)    04-22    139  |  105  |  24<H>  ----------------------------<  100<H>  4.0   |  24  |  1.05    Ca    8.7      22 Apr 2019 05:30                                    8.0    11.0  )-----------( 241      ( 23 Apr 2019 05:11 )             23.9                         8.4    15.47 )-----------( 322      ( 22 Apr 2019 19:42 )             27.2                         8.8    11.9  )-----------( 275      ( 22 Apr 2019 05:30 )             27.3                         7.8    13.0  )-----------( 262      ( 21 Apr 2019 16:24 )             23.0                         7.5    11.56 )-----------( 292      ( 21 Apr 2019 09:19 )             24.1     Imaging:        < from: Upper Endoscopy (04.22.19 @ 09:22) >  Findings:       The examined esophagus was normal.       The Z-line was regular and was found 37 cm from the incisors.       Scattered moderate inflammation characterized by erosions was found at the incisura, in the        gastric antrum and in the prepyloric region of the stomach. No active bleedingwas seen.        Biopsies were taken with a cold forceps for histology from the antrum, incisura and body for        H pylori testing.       A single medium papule (nodule) about 1 cm in size with no stigmata of recent bleeding was        found in the prepyloric region of the stomach.       The exam of the stomach was otherwise normal.       The duodenal bulb and 2nd part of the duodenum were normal.       Impression:          - Normal esophagus.                       - Z-line regular, 37 cm from the incisors.                       - Erosive gastritis. Biopsied. Most likely the cause of melena.                       - Gastric nodule.          - Normal duodenal bulb and 2nd part of the duodenum.  Recommendation:      - [facility] patient [facility] care.                       - Use Protonix (pantoprazole) 40 mg PO daily.                       - Await pathology results.                       - Advance diet as tolerated.                       - Monitor blood counts.                       - Outpatient GI follow up for EUS of the gastric nodule.                       - Colonoscopy as outpatient for colon cancer screening, unless melena                        continues then this would have to be done inpatient.    < end of copied text >

## 2019-04-23 NOTE — PROGRESS NOTE ADULT - PROBLEM SELECTOR PLAN 8
-DVT prophylaxis resuming eliquis  -Dash diet/Diabetic diet  -CODE STATUS: Full code per previous discussion with patient and wife

## 2019-04-23 NOTE — DIETITIAN INITIAL EVALUATION ADULT. - ENERGY NEEDS
Ht: 72 inches Wt: 221.3 pounds BMI: 30.0 kg/m2 IBW: 178 (+/-10%) 124.2 %IBW  Pertinent information: Pt 65 y/o M with PMH: GERD, HTN, T2DM, anxiety, atrial flutter, aortic stenosis, right arm fracture (2013), PPM (2013), depression, HLD, admitted with melena, found with upper GI bleeding, anemia due to blood loss, S/P EGD (04/22) - showing gastric nodule.   No noted edema as per flow sheets. Skin: no noted pressure injuries as per documentation.

## 2019-04-23 NOTE — PROGRESS NOTE ADULT - SUBJECTIVE AND OBJECTIVE BOX
Cardiology Attending Progress Note    CHIEF COMPLAINT/REASON FOR CONSULT: pre-op risk stratification   Date of Admission: 04-20-19    HISTORY OF PRESENT ILLNESS:    64y.o. Male with DM2, HTN, s/p Right arm fracture (2013, takes ibuprofen for physical therapy), CHB s/p PPM ([facility] [id number], implanted 02/28/2013, last interrogation 03/05/2019 without events, patient with persistent AFL since 09/21/2018), Persistent Atrial Flutter (on apixaban), TTE 02/27/2013 (Normal LV/RV function, unable to arcuately assess aortic valve, gradients suggestive of at least moderate AS, mild pHTN), now admitted with black stools x 2 days, +FOBT, anemia (hgb 7.5) concerning for acute UGIB.  Cardiology consulted for pre-op risk stratification in anticipation of urgent EGD. Now s/p EGD (Erosive Gastritis which is most likely the source of melena). Patient still anemic despite multiple PRBC transfusion (hgb 8.0).     Interval Events:  Patient seen and examined. Overall he states that he feels well. No Chest Pain. No SOB. No HA/Dizziness. No Palpitations. No N/V/D/F/C.        Allergies    No Known Drug Allergies  pollen (Rhinitis; Rhinorrhea; Eye Irritation)    Intolerances      MEDICATIONS:  PARoxetine 40 milliGRAM(s) Oral daily    docusate sodium 100 milliGRAM(s) Oral three times a day  pantoprazole  Injectable 40 milliGRAM(s) IV Push every 12 hours  polyethylene glycol 3350 17 Gram(s) Oral daily  senna 2 Tablet(s) Oral at bedtime    dextrose 40% Gel 15 Gram(s) Oral once PRN  dextrose 50% Injectable 12.5 Gram(s) IV Push once  dextrose 50% Injectable 25 Gram(s) IV Push once  dextrose 50% Injectable 25 Gram(s) IV Push once  glucagon  Injectable 1 milliGRAM(s) IntraMuscular once PRN  insulin glargine Injectable (LANTUS) 15 Unit(s) SubCutaneous at bedtime  insulin lispro (HumaLOG) corrective regimen sliding scale   SubCutaneous three times a day before meals  insulin lispro (HumaLOG) corrective regimen sliding scale   SubCutaneous at bedtime  insulin lispro Injectable (HumaLOG) 4 Unit(s) SubCutaneous three times a day before meals    dextrose 5%. 1000 milliLiter(s) IV Continuous <Continuous>  influenza   Vaccine 0.5 milliLiter(s) IntraMuscular once      PAST MEDICAL & SURGICAL HISTORY:  Arrhythmia as indication for cardiac pacemaker replacement  HLD (hyperlipidemia)  Depression  Diabetes mellitus type 2 in obese  HTN (hypertension)  Diabetes mellitus type II, controlled, with no complications  Dyslipidemia  HTN (hypertension)  No significant past surgical history      FAMILY HISTORY:  No pertinent family history in first degree relatives      SOCIAL HISTORY:    Never smoked, no ETOH, no IVDU    REVIEW OF SYSTEMS:    CONSTITUTIONAL: No weakness, fevers or chills  EYES/ENT: No visual changes;  No vertigo or throat pain   NECK: No pain or stiffness  RESPIRATORY: No cough, wheezing, hemoptysis; No shortness of breath  CARDIOVASCULAR: No chest pain or palpitations  GASTROINTESTINAL: No abdominal or epigastric pain. No nausea, vomiting, or hematemesis; No diarrhea or constipation. No melena or hematochezia.  GENITOURINARY: No dysuria, frequency or hematuria  NEUROLOGICAL: No numbness or weakness  SKIN: No itching, burning, rashes, or lesions   All other review of systems is negative unless indicated above.    PHYSICAL EXAM:  T(C): 37 (04-23-19 @ 04:29), Max: 37.4 (04-22-19 @ 20:07)  HR: 59 (04-23-19 @ 04:29) (59 - 61)  BP: 144/70 (04-23-19 @ 04:29) (132/56 - 154/75)  RR: 18 (04-23-19 @ 04:29) (18 - 18)  SpO2: 96% (04-23-19 @ 04:29) (94% - 96%)  Wt(kg): --  I&O's Summary    22 Apr 2019 07:01  -  23 Apr 2019 07:00  --------------------------------------------------------  IN: 240 mL / OUT: 0 mL / NET: 240 mL        Appearance: Normal	  HEENT:   Normal oral mucosa, PERRL, EOMI	  Lymphatic: No lymphadenopathy  Cardiovascular: Normal S1 S2, No JVD, No murmurs, No edema  Respiratory: Lungs clear to auscultation	  Psychiatry: A & O x 3, Mood & affect appropriate  Gastrointestinal:  Soft, Non-tender, + BS	  Skin: No rashes, No ecchymoses, No cyanosis	  Neurologic: Non-focal  Extremities: Normal range of motion, No clubbing, cyanosis or edema  Vascular: Peripheral pulses palpable 2+ bilaterally    LABS:	 	    CBC Full  -  ( 23 Apr 2019 05:11 )  WBC Count : 11.0 K/uL  Hemoglobin : 8.0 g/dL  Hematocrit : 23.9 %  Platelet Count - Automated : 241 K/uL  Mean Cell Volume : 83.2 fl  Mean Cell Hemoglobin : 27.7 pg  Mean Cell Hemoglobin Concentration : 33.3 gm/dL  Auto Neutrophil # : x  Auto Lymphocyte # : x  Auto Monocyte # : x  Auto Eosinophil # : x  Auto Basophil # : x  Auto Neutrophil % : x  Auto Lymphocyte % : x  Auto Monocyte % : x  Auto Eosinophil % : x  Auto Basophil % : x    04-22    139  |  105  |  24<H>  ----------------------------<  100<H>  4.0   |  24  |  1.05    Ca    8.7      22 Apr 2019 05:30      EKG with underlying AFL with variable block, paced rhythm@60            EKG: EKG with underlying AFL with variable block, paced rhythm@60    Telemetry: AFL, paced rhythm    CXR: None     TTE: 02/27/2013:  Ejection Fraction: 62 %  ------------------------------------------------------------------------  Observations:  Mitral Valve: Normal mitral valve. Minimal mitral  regurgitation.  Aortic Valve/Aorta: Aortic valve leaflet morphology not  well visualized.  The valve is calcified. Peak transaortic  valve gradient equals 54 mm Hg, mean transaortic valve  gradient equals 37 mm Hg, consistent with at least moderate  aortic stenosis.  Normal aortic root.  Left Ventricle: Endocardium not well visualized; grossly  normal left ventricular systolic function. Normal left  ventricular internal dimensions and wall thicknesses.  Normal diastolic function  Right Heart: Normal right atrium. The right ventricle is  not well visualized; grossly normal right ventricular  systolic function. Normal tricuspid valve.  Minimal  tricuspid regurgitation. Normal pulmonic valve. Minimal  pulmonic regurgitation.  Pericardium/Pleura: Normal pericardium with no pericardial  effusion.  Hemodynamic: Estimated right atrial pressure is 10 mm Hg.  Estimated right ventricular systolic pressure equals 40 mm  Hg, assuming right atrial pressure equals 10 mm Hg,  consistent with mild pulmonary hypertension.  ------------------------------------------------------------------------  Conclusions:  1. Normal mitral valve. Minimal mitral regurgitation.  2. Aortic valve leaflet morphology not well visualized.  The valve is calcified. Peak transaortic valve gradient  equals 54 mm Hg, mean transaortic valve gradient equals 37  mm Hg, consistent with at least moderate aortic stenosis.  However, unable to accurately estimate the aortic valve  area.  3. Normal left ventricular internal dimensions and wall  thicknesses.  4. Endocardium not well visualized; grossly normal left  ventricular systolic function.  5. Normal diastolic function  6. The right ventricle is not well visualized; grossly  normal right ventricular systolic function.  7. Estimated right ventricular systolic pressure equals 40  mm Hg, assuming right atrial pressure equals 10 mm Hg,  consistent with mild pulmonary hypertension.  Recommend RAJAT for further evaluation of the aortic valve,  if clinically indicated.    [facility]: 01/30/2012:  Valves: Aortic valve: There was moderate aortic stenosis.  Coronary vessels: The coronary circulation is right dominant.  LM:      LM: Normal.  LAD:      D1: There was a 30 % stenosis.  CX:      Circumflex: Normal.  RCA:      Mid RCA: Angiography showed mild atherosclerosis with no flow  limiting lesions.  Complications: There were no complications.    A/P: 64y.o. Male with DM2, HTN, s/p Right arm fracture (2013, takes ibuprofen for physical therapy), CHB s/p PPM ([facility] [id number], implanted 02/28/2013, last interrogation 03/05/2019 without events, patient with persistent AFL since 09/21/2018), Persistent Atrial Flutter (on apixaban), TTE 02/27/2013 (Normal LV/RV function, unable to arcuately assess aortic valve, gradients suggestive of at least moderate AS, mild pHTN), now admitted with black stools x 2 days, +FOBT, anemia (hgb 7.5) concerning for acute UGIB.  Cardiology consulted for pre-op risk stratification in anticipation of urgent EGD. Now s/p EGD (Erosive Gastritis which is most likely the source of melena). Patient still anemic despite multiple PRBC transfusion (hgb 8.0).       1. Aortic Stenosis - Patient with significant uncorrected aortic stenosis based on prior TTE  -Appears well compensated at this time. No indication or diuretics.   -Most recent [facility] 2012 non-obstructive  -Active arrythmia: Underlying Atrial Flutter (persistent since 09/2018), PPM dependent 2/2 complete heart block.  -Now s/p EGD with gastritis which is likely source of melena  -Pending TTE to evaluate degree of aortic stenosis, which I suspect is severe based on expected progression in AS from last TTE in 2013.  -Given active bleeding with melena and anemia, would wait for gastritis to resolve before attempting TAVR. Ultimately he will need C-Scope as an outpatient (unless persistent bleeding this admission)  -Plan for further workup with structural heart team (Dr. Ruiz) for TAVR as an outpatient once bleeding and gastritis have resolved, and GI workup completed.    2. AFL, CHB s/p PPM -   -Cont to hold Apixaban in the setting of GIB.  -Once stable from a GI standpoint and gastritis resolved, would give trial of OAC with Apixaban 2.5 mg po BID. This may be re-started as an outpatient as time may be needed for gastritis to improve.  -OHMYO2ELAM = 3 (HTN, DM2, CAD). Overall he's at moderate risk for thromboembolic events.  - last interrogation 03/05/2019 without events, patient with persistent AFL since 09/21/2018)  -Follows with Dr. Woodall for EPS    3. CAD - Last [facility] 2012 non-obstructive (only minor luminal irregularities at that time)  -Hold ASA (for primary prevention) in the setting of GIB as noted above.  -Start atorvastatin 40 mg po QHS    4. HTN - BP Controlled  -Resume lisinopril 20 mg po QD    Cardiology will sign off at this time. Please re-consult as needed.  Please arrange for the outpatient cardiology follow-up within 4-6 weeks at the [facility] by calling [phone].      Jamie Hernandez MD  Cardiology Attending  [facility] / [facility]  Cell: [phone]  (Cardiology Nocturnist cell number available 7 pm - 7 am every night; available daytime week days for follow-up only; daytime weekends covered by general cardiology consult service) Cardiology Attending Progress Note    CHIEF COMPLAINT/REASON FOR CONSULT: pre-op risk stratification   Date of Admission: 04-20-19    HISTORY OF PRESENT ILLNESS:    64y.o. Male with DM2, HTN, s/p Right arm fracture (2013, takes ibuprofen for physical therapy), CHB s/p PPM ([facility] [id number], implanted 02/28/2013, last interrogation 03/05/2019 without events, patient with persistent AFL since 09/21/2018), Persistent Atrial Flutter (on apixaban), TTE 02/27/2013 (Normal LV/RV function, unable to arcuately assess aortic valve, gradients suggestive of at least moderate AS, mild pHTN), now admitted with black stools x 2 days, +FOBT, anemia (hgb 7.5) concerning for acute UGIB.  Cardiology consulted for pre-op risk stratification in anticipation of urgent EGD. Now s/p EGD (Erosive Gastritis which is most likely the source of melena). Patient still anemic despite multiple PRBC transfusion (hgb 8.0).     Interval Events:  Patient seen and examined. Overall he states that he feels well. No Chest Pain. No SOB. No HA/Dizziness. No Palpitations. No N/V/D/F/C.        Allergies    No Known Drug Allergies  pollen (Rhinitis; Rhinorrhea; Eye Irritation)    Intolerances      MEDICATIONS:  PARoxetine 40 milliGRAM(s) Oral daily    docusate sodium 100 milliGRAM(s) Oral three times a day  pantoprazole  Injectable 40 milliGRAM(s) IV Push every 12 hours  polyethylene glycol 3350 17 Gram(s) Oral daily  senna 2 Tablet(s) Oral at bedtime    dextrose 40% Gel 15 Gram(s) Oral once PRN  dextrose 50% Injectable 12.5 Gram(s) IV Push once  dextrose 50% Injectable 25 Gram(s) IV Push once  dextrose 50% Injectable 25 Gram(s) IV Push once  glucagon  Injectable 1 milliGRAM(s) IntraMuscular once PRN  insulin glargine Injectable (LANTUS) 15 Unit(s) SubCutaneous at bedtime  insulin lispro (HumaLOG) corrective regimen sliding scale   SubCutaneous three times a day before meals  insulin lispro (HumaLOG) corrective regimen sliding scale   SubCutaneous at bedtime  insulin lispro Injectable (HumaLOG) 4 Unit(s) SubCutaneous three times a day before meals    dextrose 5%. 1000 milliLiter(s) IV Continuous <Continuous>  influenza   Vaccine 0.5 milliLiter(s) IntraMuscular once      PAST MEDICAL & SURGICAL HISTORY:  Arrhythmia as indication for cardiac pacemaker replacement  HLD (hyperlipidemia)  Depression  Diabetes mellitus type 2 in obese  HTN (hypertension)  Diabetes mellitus type II, controlled, with no complications  Dyslipidemia  HTN (hypertension)  No significant past surgical history      FAMILY HISTORY:  No pertinent family history in first degree relatives      SOCIAL HISTORY:    Never smoked, no ETOH, no IVDU    REVIEW OF SYSTEMS:    CONSTITUTIONAL: No weakness, fevers or chills  EYES/ENT: No visual changes;  No vertigo or throat pain   NECK: No pain or stiffness  RESPIRATORY: No cough, wheezing, hemoptysis; No shortness of breath  CARDIOVASCULAR: No chest pain or palpitations  GASTROINTESTINAL: No abdominal or epigastric pain. No nausea, vomiting, or hematemesis; No diarrhea or constipation. No melena or hematochezia.  GENITOURINARY: No dysuria, frequency or hematuria  NEUROLOGICAL: No numbness or weakness  SKIN: No itching, burning, rashes, or lesions   All other review of systems is negative unless indicated above.    PHYSICAL EXAM:  T(C): 37 (04-23-19 @ 04:29), Max: 37.4 (04-22-19 @ 20:07)  HR: 59 (04-23-19 @ 04:29) (59 - 61)  BP: 144/70 (04-23-19 @ 04:29) (132/56 - 154/75)  RR: 18 (04-23-19 @ 04:29) (18 - 18)  SpO2: 96% (04-23-19 @ 04:29) (94% - 96%)  Wt(kg): --  I&O's Summary    22 Apr 2019 07:01  -  23 Apr 2019 07:00  --------------------------------------------------------  IN: 240 mL / OUT: 0 mL / NET: 240 mL        Appearance: Normal	  HEENT:   Normal oral mucosa, PERRL, EOMI	  Lymphatic: No lymphadenopathy  Cardiovascular: Normal S1 S2, No JVD, No murmurs, No edema  Respiratory: Lungs clear to auscultation	  Psychiatry: A & O x 3, Mood & affect appropriate  Gastrointestinal:  Soft, Non-tender, + BS	  Skin: No rashes, No ecchymoses, No cyanosis	  Neurologic: Non-focal  Extremities: Normal range of motion, No clubbing, cyanosis or edema  Vascular: Peripheral pulses palpable 2+ bilaterally    LABS:	 	    CBC Full  -  ( 23 Apr 2019 05:11 )  WBC Count : 11.0 K/uL  Hemoglobin : 8.0 g/dL  Hematocrit : 23.9 %  Platelet Count - Automated : 241 K/uL  Mean Cell Volume : 83.2 fl  Mean Cell Hemoglobin : 27.7 pg  Mean Cell Hemoglobin Concentration : 33.3 gm/dL  Auto Neutrophil # : x  Auto Lymphocyte # : x  Auto Monocyte # : x  Auto Eosinophil # : x  Auto Basophil # : x  Auto Neutrophil % : x  Auto Lymphocyte % : x  Auto Monocyte % : x  Auto Eosinophil % : x  Auto Basophil % : x    04-22    139  |  105  |  24<H>  ----------------------------<  100<H>  4.0   |  24  |  1.05    Ca    8.7      22 Apr 2019 05:30      EKG with underlying AFL with variable block, paced rhythm@60            EKG: EKG with underlying AFL with variable block, paced rhythm@60    Telemetry: AFL, paced rhythm    CXR: None     TTE: 02/27/2013:  Ejection Fraction: 62 %  ------------------------------------------------------------------------  Observations:  Mitral Valve: Normal mitral valve. Minimal mitral  regurgitation.  Aortic Valve/Aorta: Aortic valve leaflet morphology not  well visualized.  The valve is calcified. Peak transaortic  valve gradient equals 54 mm Hg, mean transaortic valve  gradient equals 37 mm Hg, consistent with at least moderate  aortic stenosis.  Normal aortic root.  Left Ventricle: Endocardium not well visualized; grossly  normal left ventricular systolic function. Normal left  ventricular internal dimensions and wall thicknesses.  Normal diastolic function  Right Heart: Normal right atrium. The right ventricle is  not well visualized; grossly normal right ventricular  systolic function. Normal tricuspid valve.  Minimal  tricuspid regurgitation. Normal pulmonic valve. Minimal  pulmonic regurgitation.  Pericardium/Pleura: Normal pericardium with no pericardial  effusion.  Hemodynamic: Estimated right atrial pressure is 10 mm Hg.  Estimated right ventricular systolic pressure equals 40 mm  Hg, assuming right atrial pressure equals 10 mm Hg,  consistent with mild pulmonary hypertension.  ------------------------------------------------------------------------  Conclusions:  1. Normal mitral valve. Minimal mitral regurgitation.  2. Aortic valve leaflet morphology not well visualized.  The valve is calcified. Peak transaortic valve gradient  equals 54 mm Hg, mean transaortic valve gradient equals 37  mm Hg, consistent with at least moderate aortic stenosis.  However, unable to accurately estimate the aortic valve  area.  3. Normal left ventricular internal dimensions and wall  thicknesses.  4. Endocardium not well visualized; grossly normal left  ventricular systolic function.  5. Normal diastolic function  6. The right ventricle is not well visualized; grossly  normal right ventricular systolic function.  7. Estimated right ventricular systolic pressure equals 40  mm Hg, assuming right atrial pressure equals 10 mm Hg,  consistent with mild pulmonary hypertension.  Recommend RAJAT for further evaluation of the aortic valve,  if clinically indicated.    [facility]: 01/30/2012:  Valves: Aortic valve: There was moderate aortic stenosis.  Coronary vessels: The coronary circulation is right dominant.  LM:      LM: Normal.  LAD:      D1: There was a 30 % stenosis.  CX:      Circumflex: Normal.  RCA:      Mid RCA: Angiography showed mild atherosclerosis with no flow  limiting lesions.  Complications: There were no complications.    A/P: 64y.o. Male with DM2, HTN, s/p Right arm fracture (2013, takes ibuprofen for physical therapy), CHB s/p PPM ([facility] [id number], implanted 02/28/2013, last interrogation 03/05/2019 without events, patient with persistent AFL since 09/21/2018), Persistent Atrial Flutter (on apixaban), TTE 02/27/2013 (Normal LV/RV function, unable to arcuately assess aortic valve, gradients suggestive of at least moderate AS, mild pHTN), now admitted with black stools x 2 days, +FOBT, anemia (hgb 7.5) concerning for acute UGIB.  Cardiology consulted for pre-op risk stratification in anticipation of urgent EGD. Now s/p EGD (Erosive Gastritis which is most likely the source of melena). Patient still anemic despite multiple PRBC transfusion (hgb 8.0).       1. Aortic Stenosis - Patient with significant uncorrected aortic stenosis based on prior TTE  -Appears well compensated at this time. No indication or diuretics.   -Most recent [facility] 2012 non-obstructive  -Active arrythmia: Underlying Atrial Flutter (persistent since 09/2018), PPM dependent 2/2 complete heart block.  -Now s/p EGD with gastritis which is likely source of melena  -Pending TTE to evaluate degree of aortic stenosis, which I suspect is severe based on expected progression in AS from last TTE in 2013.  -Given active bleeding with melena and anemia, would wait for gastritis to resolve before attempting TAVR. Ultimately he will need C-Scope as an outpatient (unless persistent bleeding this admission)  -If TTE confirms sevee AS, would plan for further workup with structural heart team (Dr. Ruiz) for TAVR as an outpatient once bleeding and gastritis have resolved, and GI workup completed.    2. AFL, CHB s/p PPM -   -Cont to hold Apixaban in the setting of GIB.  -Once stable from a GI standpoint and gastritis resolved, would give trial of OAC with Apixaban 2.5 mg po BID.   -[id number] = 3 (HTN, DM2, CAD). Overall he's at moderate risk for thromboembolic events.  - last interrogation 03/05/2019 without events, patient with persistent AFL since 09/21/2018)  -Follows with Dr. Woodall for EPS    3. CAD - Last [facility] 2012 non-obstructive (only minor luminal irregularities at that time)  -Hold ASA (for primary prevention) in the setting of GIB as noted above.  -Start atorvastatin 40 mg po QHS    4. HTN - BP Controlled  -Resume lisinopril 20 mg po QD    Cardiology will sign off at this time. Please re-consult as needed.  Please arrange for the outpatient cardiology follow-up within 4-6 weeks at the [facility] by calling [phone].      Jamie Hernandez MD  Cardiology Attending  [facility]  Cell: [phone]  (Cardiology Nocturnist cell number available 7 pm - 7 am every night; available daytime week days for follow-up only; daytime weekends covered by general cardiology consult service) Cardiology Attending Progress Note    CHIEF COMPLAINT/REASON FOR CONSULT: pre-op risk stratification   Date of Admission: 04-20-19    HISTORY OF PRESENT ILLNESS:    64y.o. Male with DM2, HTN, s/p Right arm fracture (2013, takes ibuprofen for physical therapy), CHB s/p PPM ([facility] [id number], implanted 02/28/2013, last interrogation 03/05/2019 without events, patient with persistent AFL since 09/21/2018), Persistent Atrial Flutter (on apixaban), TTE 02/27/2013 (Normal LV/RV function, unable to arcuately assess aortic valve, gradients suggestive of at least moderate AS, mild pHTN), now admitted with black stools x 2 days, +FOBT, anemia (hgb 7.5) concerning for acute UGIB.  Cardiology consulted for pre-op risk stratification in anticipation of urgent EGD. Now s/p EGD (Erosive Gastritis which is most likely the source of melena). Patient still anemic despite multiple PRBC transfusion (hgb 8.0).     Interval Events:  Patient seen and examined. Overall he states that he feels well. No Chest Pain. No SOB. No HA/Dizziness. No Palpitations. No N/V/D/F/C.        Allergies    No Known Drug Allergies  pollen (Rhinitis; Rhinorrhea; Eye Irritation)    Intolerances      MEDICATIONS:  PARoxetine 40 milliGRAM(s) Oral daily    docusate sodium 100 milliGRAM(s) Oral three times a day  pantoprazole  Injectable 40 milliGRAM(s) IV Push every 12 hours  polyethylene glycol 3350 17 Gram(s) Oral daily  senna 2 Tablet(s) Oral at bedtime    dextrose 40% Gel 15 Gram(s) Oral once PRN  dextrose 50% Injectable 12.5 Gram(s) IV Push once  dextrose 50% Injectable 25 Gram(s) IV Push once  dextrose 50% Injectable 25 Gram(s) IV Push once  glucagon  Injectable 1 milliGRAM(s) IntraMuscular once PRN  insulin glargine Injectable (LANTUS) 15 Unit(s) SubCutaneous at bedtime  insulin lispro (HumaLOG) corrective regimen sliding scale   SubCutaneous three times a day before meals  insulin lispro (HumaLOG) corrective regimen sliding scale   SubCutaneous at bedtime  insulin lispro Injectable (HumaLOG) 4 Unit(s) SubCutaneous three times a day before meals    dextrose 5%. 1000 milliLiter(s) IV Continuous <Continuous>  influenza   Vaccine 0.5 milliLiter(s) IntraMuscular once      PAST MEDICAL & SURGICAL HISTORY:  Arrhythmia as indication for cardiac pacemaker replacement  HLD (hyperlipidemia)  Depression  Diabetes mellitus type 2 in obese  HTN (hypertension)  Diabetes mellitus type II, controlled, with no complications  Dyslipidemia  HTN (hypertension)  No significant past surgical history      FAMILY HISTORY:  No pertinent family history in first degree relatives      SOCIAL HISTORY:    Never smoked, no ETOH, no IVDU    REVIEW OF SYSTEMS:    CONSTITUTIONAL: No weakness, fevers or chills  EYES/ENT: No visual changes;  No vertigo or throat pain   NECK: No pain or stiffness  RESPIRATORY: No cough, wheezing, hemoptysis; No shortness of breath  CARDIOVASCULAR: No chest pain or palpitations  GASTROINTESTINAL: No abdominal or epigastric pain. No nausea, vomiting, or hematemesis; No diarrhea or constipation. No melena or hematochezia.  GENITOURINARY: No dysuria, frequency or hematuria  NEUROLOGICAL: No numbness or weakness  SKIN: No itching, burning, rashes, or lesions   All other review of systems is negative unless indicated above.    PHYSICAL EXAM:  T(C): 37 (04-23-19 @ 04:29), Max: 37.4 (04-22-19 @ 20:07)  HR: 59 (04-23-19 @ 04:29) (59 - 61)  BP: 144/70 (04-23-19 @ 04:29) (132/56 - 154/75)  RR: 18 (04-23-19 @ 04:29) (18 - 18)  SpO2: 96% (04-23-19 @ 04:29) (94% - 96%)  Wt(kg): --  I&O's Summary    22 Apr 2019 07:01  -  23 Apr 2019 07:00  --------------------------------------------------------  IN: 240 mL / OUT: 0 mL / NET: 240 mL        Appearance: Normal	  HEENT:   Normal oral mucosa, PERRL, EOMI	  Lymphatic: No lymphadenopathy  Cardiovascular: Normal S1 S2, No JVD, No murmurs, No edema  Respiratory: Lungs clear to auscultation	  Psychiatry: A & O x 3, Mood & affect appropriate  Gastrointestinal:  Soft, Non-tender, + BS	  Skin: No rashes, No ecchymoses, No cyanosis	  Neurologic: Non-focal  Extremities: Normal range of motion, No clubbing, cyanosis or edema  Vascular: Peripheral pulses palpable 2+ bilaterally    LABS:	 	    CBC Full  -  ( 23 Apr 2019 05:11 )  WBC Count : 11.0 K/uL  Hemoglobin : 8.0 g/dL  Hematocrit : 23.9 %  Platelet Count - Automated : 241 K/uL  Mean Cell Volume : 83.2 fl  Mean Cell Hemoglobin : 27.7 pg  Mean Cell Hemoglobin Concentration : 33.3 gm/dL  Auto Neutrophil # : x  Auto Lymphocyte # : x  Auto Monocyte # : x  Auto Eosinophil # : x  Auto Basophil # : x  Auto Neutrophil % : x  Auto Lymphocyte % : x  Auto Monocyte % : x  Auto Eosinophil % : x  Auto Basophil % : x    04-22    139  |  105  |  24<H>  ----------------------------<  100<H>  4.0   |  24  |  1.05    Ca    8.7      22 Apr 2019 05:30      EKG with underlying AFL with variable block, paced rhythm@60            EKG: EKG with underlying AFL with variable block, paced rhythm@60    Telemetry: AFL, paced rhythm    CXR: None     TTE: 02/27/2013:  Ejection Fraction: 62 %  ------------------------------------------------------------------------  Observations:  Mitral Valve: Normal mitral valve. Minimal mitral  regurgitation.  Aortic Valve/Aorta: Aortic valve leaflet morphology not  well visualized.  The valve is calcified. Peak transaortic  valve gradient equals 54 mm Hg, mean transaortic valve  gradient equals 37 mm Hg, consistent with at least moderate  aortic stenosis.  Normal aortic root.  Left Ventricle: Endocardium not well visualized; grossly  normal left ventricular systolic function. Normal left  ventricular internal dimensions and wall thicknesses.  Normal diastolic function  Right Heart: Normal right atrium. The right ventricle is  not well visualized; grossly normal right ventricular  systolic function. Normal tricuspid valve.  Minimal  tricuspid regurgitation. Normal pulmonic valve. Minimal  pulmonic regurgitation.  Pericardium/Pleura: Normal pericardium with no pericardial  effusion.  Hemodynamic: Estimated right atrial pressure is 10 mm Hg.  Estimated right ventricular systolic pressure equals 40 mm  Hg, assuming right atrial pressure equals 10 mm Hg,  consistent with mild pulmonary hypertension.  ------------------------------------------------------------------------  Conclusions:  1. Normal mitral valve. Minimal mitral regurgitation.  2. Aortic valve leaflet morphology not well visualized.  The valve is calcified. Peak transaortic valve gradient  equals 54 mm Hg, mean transaortic valve gradient equals 37  mm Hg, consistent with at least moderate aortic stenosis.  However, unable to accurately estimate the aortic valve  area.  3. Normal left ventricular internal dimensions and wall  thicknesses.  4. Endocardium not well visualized; grossly normal left  ventricular systolic function.  5. Normal diastolic function  6. The right ventricle is not well visualized; grossly  normal right ventricular systolic function.  7. Estimated right ventricular systolic pressure equals 40  mm Hg, assuming right atrial pressure equals 10 mm Hg,  consistent with mild pulmonary hypertension.  Recommend RAJAT for further evaluation of the aortic valve,  if clinically indicated.    [facility]: 01/30/2012:  Valves: Aortic valve: There was moderate aortic stenosis.  Coronary vessels: The coronary circulation is right dominant.  LM:      LM: Normal.  LAD:      D1: There was a 30 % stenosis.  CX:      Circumflex: Normal.  RCA:      Mid RCA: Angiography showed mild atherosclerosis with no flow  limiting lesions.  Complications: There were no complications.    A/P: 64y.o. Male with DM2, HTN, s/p Right arm fracture (2013, takes ibuprofen for physical therapy), CHB s/p PPM ([facility] [employer] [id number], implanted 02/28/2013, last interrogation 03/05/2019 without events, patient with persistent AFL since 09/21/2018), Persistent Atrial Flutter (on apixaban), TTE 02/27/2013 (Normal LV/RV function, unable to arcuately assess aortic valve, gradients suggestive of at least moderate AS, mild pHTN), now admitted with black stools x 2 days, +FOBT, anemia (hgb 7.5) concerning for acute UGIB.  Cardiology consulted for pre-op risk stratification in anticipation of urgent EGD. Now s/p EGD (Erosive Gastritis which is most likely the source of melena). Patient still anemic despite multiple PRBC transfusion (hgb 8.0).       1. Aortic Stenosis - Patient with significant uncorrected aortic stenosis based on prior TTE  -Appears well compensated at this time. No indication or diuretics.   -Most recent [facility] 2012 non-obstructive  -Active arrythmia: Underlying Atrial Flutter (persistent since 09/2018), PPM dependent 2/2 complete heart block.  -Now s/p EGD with gastritis which is likely source of melena  -Pending TTE to evaluate degree of aortic stenosis, which I suspect is severe based on expected progression in AS from last TTE in 2013.  -Given active bleeding with melena and anemia, would wait for gastritis to resolve before attempting TAVR. Ultimately he will need C-Scope as an outpatient (unless persistent bleeding this admission)  -If TTE confirms severe AS, would plan for further workup with structural heart team (Dr. Ruiz) for TAVR as an outpatient once bleeding and gastritis have resolved, and GI workup completed.    2. AFL, CHB s/p PPM -   -Cont to hold Apixaban in the setting of GIB.  -Once stable from a GI standpoint and gastritis resolved, would give trial of OAC with Apixaban 2.5 mg po BID.   -[id number] = 3 (HTN, DM2, CAD). Overall he's at moderate risk for thromboembolic events.  - last interrogation 03/05/2019 without events, patient with persistent AFL since 09/21/2018)  -Follows with Dr. Woodall for EPS    3. CAD - Last [facility] 2012 non-obstructive (only minor luminal irregularities at that time)  -Hold ASA (for primary prevention) in the setting of GIB as noted above.  -Start atorvastatin 40 mg po QHS    4. HTN - BP Controlled  -Resume lisinopril 20 mg po QD    Cardiology will sign off at this time. Please re-consult as needed.  Please arrange for the outpatient cardiology follow-up within 4-6 weeks at the [facility] by calling [phone].      Jamie Hernandez MD  Cardiology Attending  [facility] / [facility]  Cell: [phone]  (Cardiology Nocturnist cell number available 7 pm - 7 am every night; available daytime week days for follow-up only; daytime weekends covered by general cardiology consult service) Cardiology Attending Progress Note    CHIEF COMPLAINT/REASON FOR CONSULT: pre-op risk stratification   Date of Admission: 04-20-19    HISTORY OF PRESENT ILLNESS:    64y.o. Male with DM2, HTN, s/p Right arm fracture (2013, takes ibuprofen for physical therapy), CHB s/p PPM ([facility] [employer] [id number], implanted 02/28/2013, last interrogation 03/05/2019 without events, patient with persistent AFL since 09/21/2018), Persistent Atrial Flutter (on apixaban), TTE 02/27/2013 (Normal LV/RV function, unable to arcuately assess aortic valve, gradients suggestive of at least moderate AS, mild pHTN), now admitted with black stools x 2 days, +FOBT, anemia (hgb 7.5) concerning for acute UGIB.  Cardiology consulted for pre-op risk stratification in anticipation of urgent EGD. Now s/p EGD (Erosive Gastritis which is most likely the source of melena). Patient still anemic despite multiple PRBC transfusion (hgb 8.0).     Interval Events:  Patient seen and examined. Overall he states that he feels well. No Chest Pain. No SOB. No HA/Dizziness. No Palpitations. No N/V/D/F/C.        Allergies    No Known Drug Allergies  pollen (Rhinitis; Rhinorrhea; Eye Irritation)    Intolerances      MEDICATIONS:  PARoxetine 40 milliGRAM(s) Oral daily    docusate sodium 100 milliGRAM(s) Oral three times a day  pantoprazole  Injectable 40 milliGRAM(s) IV Push every 12 hours  polyethylene glycol 3350 17 Gram(s) Oral daily  senna 2 Tablet(s) Oral at bedtime    dextrose 40% Gel 15 Gram(s) Oral once PRN  dextrose 50% Injectable 12.5 Gram(s) IV Push once  dextrose 50% Injectable 25 Gram(s) IV Push once  dextrose 50% Injectable 25 Gram(s) IV Push once  glucagon  Injectable 1 milliGRAM(s) IntraMuscular once PRN  insulin glargine Injectable (LANTUS) 15 Unit(s) SubCutaneous at bedtime  insulin lispro (HumaLOG) corrective regimen sliding scale   SubCutaneous three times a day before meals  insulin lispro (HumaLOG) corrective regimen sliding scale   SubCutaneous at bedtime  insulin lispro Injectable (HumaLOG) 4 Unit(s) SubCutaneous three times a day before meals    dextrose 5%. 1000 milliLiter(s) IV Continuous <Continuous>  influenza   Vaccine 0.5 milliLiter(s) IntraMuscular once      PAST MEDICAL & SURGICAL HISTORY:  Arrhythmia as indication for cardiac pacemaker replacement  HLD (hyperlipidemia)  Depression  Diabetes mellitus type 2 in obese  HTN (hypertension)  Diabetes mellitus type II, controlled, with no complications  Dyslipidemia  HTN (hypertension)  No significant past surgical history      FAMILY HISTORY:  No pertinent family history in first degree relatives      SOCIAL HISTORY:    Never smoked, no ETOH, no IVDU    REVIEW OF SYSTEMS:    CONSTITUTIONAL: No weakness, fevers or chills  EYES/ENT: No visual changes;  No vertigo or throat pain   NECK: No pain or stiffness  RESPIRATORY: No cough, wheezing, hemoptysis; No shortness of breath  CARDIOVASCULAR: No chest pain or palpitations  GASTROINTESTINAL: No abdominal or epigastric pain. No nausea, vomiting, or hematemesis; No diarrhea or constipation. No melena or hematochezia.  GENITOURINARY: No dysuria, frequency or hematuria  NEUROLOGICAL: No numbness or weakness  SKIN: No itching, burning, rashes, or lesions   All other review of systems is negative unless indicated above.    PHYSICAL EXAM:  T(C): 37 (04-23-19 @ 04:29), Max: 37.4 (04-22-19 @ 20:07)  HR: 59 (04-23-19 @ 04:29) (59 - 61)  BP: 144/70 (04-23-19 @ 04:29) (132/56 - 154/75)  RR: 18 (04-23-19 @ 04:29) (18 - 18)  SpO2: 96% (04-23-19 @ 04:29) (94% - 96%)  Wt(kg): --  I&O's Summary    22 Apr 2019 07:01  -  23 Apr 2019 07:00  --------------------------------------------------------  IN: 240 mL / OUT: 0 mL / NET: 240 mL        Appearance: Normal	  HEENT:   Normal oral mucosa, PERRL, EOMI	  Lymphatic: No lymphadenopathy  Cardiovascular: Normal S1 S2, No JVD, No murmurs, No edema  Respiratory: Lungs clear to auscultation	  Psychiatry: A & O x 3, Mood & affect appropriate  Gastrointestinal:  Soft, Non-tender, + BS	  Skin: No rashes, No ecchymoses, No cyanosis	  Neurologic: Non-focal  Extremities: Normal range of motion, No clubbing, cyanosis or edema  Vascular: Peripheral pulses palpable 2+ bilaterally    LABS:	 	    CBC Full  -  ( 23 Apr 2019 05:11 )  WBC Count : 11.0 K/uL  Hemoglobin : 8.0 g/dL  Hematocrit : 23.9 %  Platelet Count - Automated : 241 K/uL  Mean Cell Volume : 83.2 fl  Mean Cell Hemoglobin : 27.7 pg  Mean Cell Hemoglobin Concentration : 33.3 gm/dL  Auto Neutrophil # : x  Auto Lymphocyte # : x  Auto Monocyte # : x  Auto Eosinophil # : x  Auto Basophil # : x  Auto Neutrophil % : x  Auto Lymphocyte % : x  Auto Monocyte % : x  Auto Eosinophil % : x  Auto Basophil % : x    04-22    139  |  105  |  24<H>  ----------------------------<  100<H>  4.0   |  24  |  1.05    Ca    8.7      22 Apr 2019 05:30      EKG with underlying AFL with variable block, paced rhythm@60            EKG: EKG with underlying AFL with variable block, paced rhythm@60    Telemetry: AFL, paced rhythm    CXR: None     TTE: 02/27/2013:  Ejection Fraction: 62 %  ------------------------------------------------------------------------  Observations:  Mitral Valve: Normal mitral valve. Minimal mitral  regurgitation.  Aortic Valve/Aorta: Aortic valve leaflet morphology not  well visualized.  The valve is calcified. Peak transaortic  valve gradient equals 54 mm Hg, mean transaortic valve  gradient equals 37 mm Hg, consistent with at least moderate  aortic stenosis.  Normal aortic root.  Left Ventricle: Endocardium not well visualized; grossly  normal left ventricular systolic function. Normal left  ventricular internal dimensions and wall thicknesses.  Normal diastolic function  Right Heart: Normal right atrium. The right ventricle is  not well visualized; grossly normal right ventricular  systolic function. Normal tricuspid valve.  Minimal  tricuspid regurgitation. Normal pulmonic valve. Minimal  pulmonic regurgitation.  Pericardium/Pleura: Normal pericardium with no pericardial  effusion.  Hemodynamic: Estimated right atrial pressure is 10 mm Hg.  Estimated right ventricular systolic pressure equals 40 mm  Hg, assuming right atrial pressure equals 10 mm Hg,  consistent with mild pulmonary hypertension.  ------------------------------------------------------------------------  Conclusions:  1. Normal mitral valve. Minimal mitral regurgitation.  2. Aortic valve leaflet morphology not well visualized.  The valve is calcified. Peak transaortic valve gradient  equals 54 mm Hg, mean transaortic valve gradient equals 37  mm Hg, consistent with at least moderate aortic stenosis.  However, unable to accurately estimate the aortic valve  area.  3. Normal left ventricular internal dimensions and wall  thicknesses.  4. Endocardium not well visualized; grossly normal left  ventricular systolic function.  5. Normal diastolic function  6. The right ventricle is not well visualized; grossly  normal right ventricular systolic function.  7. Estimated right ventricular systolic pressure equals 40  mm Hg, assuming right atrial pressure equals 10 mm Hg,  consistent with mild pulmonary hypertension.  Recommend RAJAT for further evaluation of the aortic valve,  if clinically indicated.    [facility]: 01/30/2012:  Valves: Aortic valve: There was moderate aortic stenosis.  Coronary vessels: The coronary circulation is right dominant.  LM:      LM: Normal.  LAD:      D1: There was a 30 % stenosis.  CX:      Circumflex: Normal.  RCA:      Mid RCA: Angiography showed mild atherosclerosis with no flow  limiting lesions.  Complications: There were no complications.    A/P: 64y.o. Male with DM2, HTN, s/p Right arm fracture (2013, takes ibuprofen for physical therapy), CHB s/p PPM ([facility] [employer] [id number], implanted 02/28/2013, last interrogation 03/05/2019 without events, patient with persistent AFL since 09/21/2018), Persistent Atrial Flutter (on apixaban), TTE 02/27/2013 (Normal LV/RV function, unable to arcuately assess aortic valve, gradients suggestive of at least moderate AS, mild pHTN), now admitted with black stools x 2 days, +FOBT, anemia (hgb 7.5) concerning for acute UGIB.  Cardiology consulted for pre-op risk stratification in anticipation of urgent EGD. Now s/p EGD (Erosive Gastritis which is most likely the source of melena). Patient still anemic despite multiple PRBC transfusion (hgb 8.0).       1. Aortic Stenosis - Patient with significant uncorrected aortic stenosis based on prior TTE  -Appears well compensated at this time. No indication or diuretics.   -Most recent [facility] 2012 non-obstructive  -Active arrythmia: Underlying Atrial Flutter (persistent since 09/2018), PPM dependent 2/2 complete heart block.  -Now s/p EGD with gastritis which is likely source of melena  -Pending TTE to evaluate degree of aortic stenosis, which I suspect is severe based on expected progression in AS from last TTE in 2013.  -Given active bleeding with melena and anemia, would wait for gastritis to resolve before attempting TAVR. Ultimately he will need C-Scope as an outpatient (unless persistent bleeding this admission)  -If TTE confirms severe AS, would plan for further workup with structural heart team (Dr. Ruiz) for TAVR as an outpatient once bleeding and gastritis have resolved, and GI workup completed.    2. AFL, CHB s/p PPM -   -Cont to hold Apixaban in the setting of GIB.  -Once stable from a GI standpoint and gastritis resolved, would give trial of OAC with Apixaban 2.5 mg po BID. If he tolerates low dose apixaban without bleeding, would plan to uptitrate back to 5 mg po BID as an outpatient.   -[id number] = 3 (HTN, DM2, CAD). Overall he's at moderate risk for thromboembolic events.  - last interrogation 03/05/2019 without events, patient with persistent AFL since 09/21/2018)  -Follows with Dr. Woodall for EPS    3. CAD - Last [facility] 2012 non-obstructive (only minor luminal irregularities at that time)  -Hold ASA (for primary prevention) in the setting of GIB as noted above.  -Start atorvastatin 40 mg po QHS    4. HTN - BP Controlled  -Resume lisinopril 20 mg po QD    Cardiology will sign off at this time. Please re-consult as needed.  Please arrange for the outpatient cardiology follow-up within 4-6 weeks at the [facility] by calling [phone].      Jamie Hernandez MD  Cardiology Attending  [facility] / [facility]  Cell: [phone]  (Cardiology Nocturnist cell number available 7 pm - 7 am every night; available daytime week days for follow-up only; daytime weekends covered by general cardiology consult service)

## 2019-04-23 NOTE — PROGRESS NOTE ADULT - ASSESSMENT
64 year old male with T2DM, HTN, anxiety, Atrial flutter (on apixaban), moderate aortic stenosis, right arm fracture (2013, takes ibuprofen once per week prior to physical therapy), s/p PPM (2013, last interrogated 03/19), GERD who presented to the ER with chief complain of black, tarry stools for 2 days.     IMPRESSION  - Acute blood loss anemia with melena (Hb 9-->7.5), in the setting of recent NSAID use, status post EGD showing erosive gastritis, likely cause of melena  - Atrial flutter (on apixaban), moderate AS, PPM placed    RECOMMENDATION:  -Use of Protonix 40mg PO daily  -Diet as tolerated  -Monitor CBC and BMs  -    - trend CBC, CMP, INR, transfuse as needed  - continue pantoprazole 40mg IV BID  - plan for upper endoscopy tomorrow 4/22/2019  - clear liquid diet today, please keep NPO after midnight   - would appreciate Cardiology risk stratification for the procedure (follows with Dr Hilton as outpatient)  - recommending holding Apixaban (if no contraindications), in the setting of active GI bleeding   - he will need a colonoscopy as an outpatient on discharge for colorectal cancer screening   - supportive care as per primary team 64 year old male with T2DM, HTN, anxiety, Atrial flutter (on apixaban), moderate aortic stenosis, right arm fracture (2013, takes ibuprofen once per week prior to physical therapy), s/p PPM (2013, last interrogated 03/19), GERD who presented to the ER with chief complain of black, tarry stools for 2 days.     IMPRESSION  - Acute blood loss anemia with melena (Hb 9-->7.5), in the setting of recent NSAID use, status post EGD showing erosive gastritis, likely cause of melena  - Atrial flutter (on apixaban), moderate AS, PPM placed    RECOMMENDATION:  -Use of Protonix 40mg PO daily  -Diet as tolerated  -Monitor CBC and BMs  -No contraindication to starting AC if need be, but would monitor H/H and BMs  -If there is no further episode of melena on AC, pt can get a colonoscopy as an outpatient for colorectal cancer screening (would do inpatient colonoscopy +/- capsule if patient has a drop in Hgb on AC)  - supportive care as per primary team 64 year old male with T2DM, HTN, anxiety, Atrial flutter (on apixaban), moderate aortic stenosis, right arm fracture (2013, takes ibuprofen once per week prior to physical therapy), s/p PPM (2013, last interrogated 03/19), GERD who presented to the ER with chief complain of black, tarry stools for 2 days.     IMPRESSION  - Acute blood loss anemia with melena (Hb 9-->7.5), in the setting of recent NSAID use, status post EGD showing erosive gastritis, likely cause of melena  - Atrial flutter (on apixaban), moderate AS, PPM placed    RECOMMENDATION:  -Use of Protonix 40mg PO daily  -Diet as tolerated  -Monitor CBC and BMs  -Follow-up pathology from EGD biopsies  -No contraindication to starting AC if need be, but would monitor H/H and BMs  -If there is no further episode of melena on AC, pt can get a colonoscopy as an outpatient for colorectal cancer screening (however, would do inpatient colonoscopy +/- capsule instead if patient has a drop in Hgb on AC)  - supportive care as per primary team

## 2019-04-23 NOTE — DIETITIAN INITIAL EVALUATION ADULT. - NS AS NUTRI INTERV ED CONTENT
Pt seen with DM team for education. Provided thorough education on T2DM and heart healthy nutrition therapy. Provided education on foods containing carbohydrates, foods containing proteins, and portion sizes. Stressed the importance of a balanced meal to maintain blood glucose. Encouraged vegetables consumption. Described HbA1c and stressed importance of its normal levels. Encouraged Pt to monitor blood glucose at home at different moments of the day everyday and suggested to keep a diary. Recommended water consumption with avoidance of soda and juice. Recommended limited salt intake. Reviewed foods high in salt and amount of salt recommended per day. Discussed nutrition label reading. Stressed the importance of limited fried foods and saturated fat consumption. Discussed how to adjust diet recall to diet recommendations. Provided handout with education provided. RD remains available. Pt seen with DM team for education in DM rounds. Provided thorough education on T2DM and heart healthy nutrition therapy. Provided education on foods containing carbohydrates, foods containing proteins, and portion sizes. Stressed the importance of a balanced meal to maintain blood glucose. Encouraged vegetables consumption. Described HbA1c and stressed importance of its normal levels. Encouraged Pt to monitor blood glucose at home at different moments of the day everyday and suggested to keep a diary. Recommended water consumption with avoidance of soda and juice. Recommended limited salt intake. Reviewed foods high in salt and amount of salt recommended per day. Discussed nutrition label reading. Stressed the importance of limited fried foods and saturated fat consumption. Discussed how to adjust diet recall to diet recommendations. Provided handout with education provided. RD remains available.

## 2019-04-23 NOTE — PROGRESS NOTE ADULT - SUBJECTIVE AND OBJECTIVE BOX
Patient is a 64y old  Male who presents with a chief complaint of Melena (2019 12:25)      INTERVAL HPI/OVERNIGHT EVENTS: Still no BM, hgb slowly downtrending.       Gastrointestinal hemorrhage  No pertinent family history in first degree relatives  Handoff  MEWS Score  Arrhythmia as indication for cardiac pacemaker replacement  HLD (hyperlipidemia)  Depression  Diabetes mellitus type 2 in obese  HTN (hypertension)  Diabetes mellitus type II, controlled, with no complications  Diabetes mellitus type II  Dyslipidemia  HTN (hypertension)  GI bleed  Aortic stenosis, severe  Atrial flutter  Need for prophylactic measure  Anxiety  HTN (hypertension)  Type 2 diabetes mellitus  Anemia due to blood loss  GI bleed  No significant past surgical history  RECTAL BLEED  90+      Review of Systems: 12 point review of systems otherwise negative  ( - )fevers/chills  ( - ) dyspnea  ( - ) cough  ( - ) chest pain  ( - ) palpatations  ( - ) dizziness/lightheadedness  ( - ) nausea/vomiting  ( - ) abd pain  ( - ) diarrhea  ( - ) melena  ( - ) hematochezia  ( - ) dysuria  ( - ) hematuria  ( - ) leg swelling  ( -) calf tenderness  ( - ) motor weakness  ( - ) extremity numbness  ( - ) back pain  ( + ) tolerating POs  ( + ) BM    MEDICATIONS  (STANDING):  apixaban 5 milliGRAM(s) Oral every 12 hours  dextrose 5%. 1000 milliLiter(s) (50 mL/Hr) IV Continuous <Continuous>  dextrose 50% Injectable 12.5 Gram(s) IV Push once  dextrose 50% Injectable 25 Gram(s) IV Push once  dextrose 50% Injectable 25 Gram(s) IV Push once  docusate sodium 100 milliGRAM(s) Oral three times a day  influenza   Vaccine 0.5 milliLiter(s) IntraMuscular once  insulin glargine Injectable (LANTUS) 15 Unit(s) SubCutaneous at bedtime  insulin lispro (HumaLOG) corrective regimen sliding scale   SubCutaneous three times a day before meals  insulin lispro (HumaLOG) corrective regimen sliding scale   SubCutaneous at bedtime  insulin lispro Injectable (HumaLOG) 4 Unit(s) SubCutaneous three times a day before meals  pantoprazole    Tablet 40 milliGRAM(s) Oral before breakfast  PARoxetine 40 milliGRAM(s) Oral daily  polyethylene glycol 3350 17 Gram(s) Oral daily  senna 2 Tablet(s) Oral at bedtime    MEDICATIONS  (PRN):  dextrose 40% Gel 15 Gram(s) Oral once PRN Blood Glucose LESS THAN 70 milliGRAM(s)/deciliter  glucagon  Injectable 1 milliGRAM(s) IntraMuscular once PRN Glucose LESS THAN 70 milligrams/deciliter      Allergies    No Known Drug Allergies  pollen (Rhinitis; Rhinorrhea; Eye Irritation)    Intolerances          Vital Signs Last 24 Hrs  T(C): 37 (2019 04:29), Max: 37.4 (2019 20:07)  T(F): 98.6 (2019 04:29), Max: 99.4 (2019 20:07)  HR: 59 (2019 04:29) (59 - 61)  BP: 144/70 (2019 04:29) (132/56 - 154/75)  BP(mean): --  RR: 18 (2019 04:29) (18 - 18)  SpO2: 96% (2019 04:29) (94% - 96%)  CAPILLARY BLOOD GLUCOSE      POCT Blood Glucose.: 219 mg/dL (2019 12:04)  POCT Blood Glucose.: 144 mg/dL (2019 08:09)  POCT Blood Glucose.: 258 mg/dL (2019 21:55)  POCT Blood Glucose.: 268 mg/dL (2019 16:49)      -22 @ 07:01  -  -23 @ 07:00  --------------------------------------------------------  IN: 240 mL / OUT: 0 mL / NET: 240 mL        Physical Exam:    Daily     Daily Weight in k.7 (2019 12:38)  General:  NAD, non toxic, pale appearing but improved from yesterday  HEENT:  Nonicteric, PERRLA  CV:  RRR, + systolic murmur  Lungs:  CTA B/L, no wheezes, rales, rhonchi  Abdomen:  Soft, non-tender, no distended, positive BS,  Extremities:  no edema  Skin:  Warm and dry, no rashes  :  No loera  Neuro:  AAOx3, non-focal  No Restraints    LABS:                        8.0    11.0  )-----------( 241      ( 2019 05:11 )             23.9     04-22    139  |  105  |  24<H>  ----------------------------<  100<H>  4.0   |  24  |  1.05    Ca    8.7      2019 05:30              RADIOLOGY & ADDITIONAL TESTS:    ---------------------------------------------------------------------------  I personally reviewed: [  ]EKG   [  ]CXR    [  ] CT    [  ]Other  ---------------------------------------------------------------------------  PLEASE CHECK WHEN PRESENT:     [  ]Heart Failure     [  ] Acute     [  ] Acute on Chronic     [  ] Chronic  -------------------------------------------------------------------     [  ]Diastolic [HFpEF]     [  ]Systolic [HFrEF]     [  ]Combined [HFpEF & HFrEF]     [  ]Other:  -------------------------------------------------------------------  [  ]MARGARITA     [  ]ATN     [  ]Reneal Medullary Necrosis     [  ]Renal Cortical Necrosis     [  ]Other Pathological Lesions:    [  ]CKD 1  [  ]CKD 2  [  ]CKD 3  [  ]CKD 4  [  ]CKD 5  [  ]Other  -------------------------------------------------------------------  [  ]Other/Unspecified:    --------------------------------------------------------------------    Abdominal Nutritional Status  [  ]Malnutrition: See Nutrition Note  [  ]Cachexia  [  ]Other:   [  ]Supplement Ordered:  [  ]Morbid Obesity (BMI >=40]

## 2019-04-24 ENCOUNTER — TRANSCRIPTION ENCOUNTER (OUTPATIENT)
Age: 64
End: 2019-04-24

## 2019-04-24 VITALS
DIASTOLIC BLOOD PRESSURE: 73 MMHG | OXYGEN SATURATION: 97 % | HEART RATE: 60 BPM | TEMPERATURE: 97 F | SYSTOLIC BLOOD PRESSURE: 138 MMHG | RESPIRATION RATE: 18 BRPM

## 2019-04-24 LAB
HCT VFR BLD CALC: 24.4 % — LOW (ref 39–50)
HCT VFR BLD CALC: 25.6 % — LOW (ref 39–50)
HGB BLD-MCNC: 7.3 G/DL — LOW (ref 13–17)
HGB BLD-MCNC: 8.3 G/DL — LOW (ref 13–17)
MCHC RBC-ENTMCNC: 25.9 PG — LOW (ref 27–34)
MCHC RBC-ENTMCNC: 26.7 PG — LOW (ref 27–34)
MCHC RBC-ENTMCNC: 29.9 GM/DL — LOW (ref 32–36)
MCHC RBC-ENTMCNC: 32.5 GM/DL — SIGNIFICANT CHANGE UP (ref 32–36)
MCV RBC AUTO: 82.1 FL — SIGNIFICANT CHANGE UP (ref 80–100)
MCV RBC AUTO: 86.5 FL — SIGNIFICANT CHANGE UP (ref 80–100)
PLATELET # BLD AUTO: 253 K/UL — SIGNIFICANT CHANGE UP (ref 150–400)
PLATELET # BLD AUTO: 258 K/UL — SIGNIFICANT CHANGE UP (ref 150–400)
RBC # BLD: 2.82 M/UL — LOW (ref 4.2–5.8)
RBC # BLD: 3.12 M/UL — LOW (ref 4.2–5.8)
RBC # FLD: 13 % — SIGNIFICANT CHANGE UP (ref 10.3–14.5)
RBC # FLD: 13.3 % — SIGNIFICANT CHANGE UP (ref 10.3–14.5)
WBC # BLD: 10.6 K/UL — HIGH (ref 3.8–10.5)
WBC # BLD: 12.27 K/UL — HIGH (ref 3.8–10.5)
WBC # FLD AUTO: 10.6 K/UL — HIGH (ref 3.8–10.5)
WBC # FLD AUTO: 12.27 K/UL — HIGH (ref 3.8–10.5)

## 2019-04-24 PROCEDURE — 88312 SPECIAL STAINS GROUP 1: CPT

## 2019-04-24 PROCEDURE — 85730 THROMBOPLASTIN TIME PARTIAL: CPT

## 2019-04-24 PROCEDURE — 86901 BLOOD TYPING SEROLOGIC RH(D): CPT

## 2019-04-24 PROCEDURE — 36430 TRANSFUSION BLD/BLD COMPNT: CPT

## 2019-04-24 PROCEDURE — P9011: CPT

## 2019-04-24 PROCEDURE — 81003 URINALYSIS AUTO W/O SCOPE: CPT

## 2019-04-24 PROCEDURE — 86850 RBC ANTIBODY SCREEN: CPT

## 2019-04-24 PROCEDURE — 99285 EMERGENCY DEPT VISIT HI MDM: CPT

## 2019-04-24 PROCEDURE — 85610 PROTHROMBIN TIME: CPT

## 2019-04-24 PROCEDURE — 82947 ASSAY GLUCOSE BLOOD QUANT: CPT

## 2019-04-24 PROCEDURE — 82803 BLOOD GASES ANY COMBINATION: CPT

## 2019-04-24 PROCEDURE — 86803 HEPATITIS C AB TEST: CPT

## 2019-04-24 PROCEDURE — 80048 BASIC METABOLIC PNL TOTAL CA: CPT

## 2019-04-24 PROCEDURE — 88305 TISSUE EXAM BY PATHOLOGIST: CPT

## 2019-04-24 PROCEDURE — 84132 ASSAY OF SERUM POTASSIUM: CPT

## 2019-04-24 PROCEDURE — 82272 OCCULT BLD FECES 1-3 TESTS: CPT

## 2019-04-24 PROCEDURE — 84295 ASSAY OF SERUM SODIUM: CPT

## 2019-04-24 PROCEDURE — 82962 GLUCOSE BLOOD TEST: CPT

## 2019-04-24 PROCEDURE — 83605 ASSAY OF LACTIC ACID: CPT

## 2019-04-24 PROCEDURE — 83036 HEMOGLOBIN GLYCOSYLATED A1C: CPT

## 2019-04-24 PROCEDURE — 86900 BLOOD TYPING SEROLOGIC ABO: CPT

## 2019-04-24 PROCEDURE — 82435 ASSAY OF BLOOD CHLORIDE: CPT

## 2019-04-24 PROCEDURE — 80053 COMPREHEN METABOLIC PANEL: CPT

## 2019-04-24 PROCEDURE — 99239 HOSP IP/OBS DSCHRG MGMT >30: CPT

## 2019-04-24 PROCEDURE — 85027 COMPLETE CBC AUTOMATED: CPT

## 2019-04-24 PROCEDURE — 85014 HEMATOCRIT: CPT

## 2019-04-24 PROCEDURE — 82330 ASSAY OF CALCIUM: CPT

## 2019-04-24 PROCEDURE — 86923 COMPATIBILITY TEST ELECTRIC: CPT

## 2019-04-24 RX ORDER — APIXABAN 2.5 MG/1
1 TABLET, FILM COATED ORAL
Qty: 28 | Refills: 0
Start: 2019-04-24 | End: 2019-05-07

## 2019-04-24 RX ORDER — APIXABAN 2.5 MG/1
1 TABLET, FILM COATED ORAL
Qty: 0 | Refills: 0 | COMMUNITY

## 2019-04-24 RX ORDER — SENNA PLUS 8.6 MG/1
2 TABLET ORAL
Qty: 0 | Refills: 0 | DISCHARGE
Start: 2019-04-24

## 2019-04-24 RX ORDER — ASPIRIN/CALCIUM CARB/MAGNESIUM 324 MG
81 TABLET ORAL DAILY
Qty: 0 | Refills: 0 | Status: DISCONTINUED | OUTPATIENT
Start: 2019-04-24 | End: 2019-04-24

## 2019-04-24 RX ORDER — DOCUSATE SODIUM 100 MG
1 CAPSULE ORAL
Qty: 0 | Refills: 0 | DISCHARGE
Start: 2019-04-24

## 2019-04-24 RX ORDER — PANTOPRAZOLE SODIUM 20 MG/1
1 TABLET, DELAYED RELEASE ORAL
Qty: 0 | Refills: 0 | DISCHARGE
Start: 2019-04-24

## 2019-04-24 RX ADMIN — APIXABAN 2.5 MILLIGRAM(S): 2.5 TABLET, FILM COATED ORAL at 17:02

## 2019-04-24 RX ADMIN — Medication 4: at 12:52

## 2019-04-24 RX ADMIN — Medication 100 MILLIGRAM(S): at 05:39

## 2019-04-24 RX ADMIN — Medication 81 MILLIGRAM(S): at 13:07

## 2019-04-24 RX ADMIN — Medication 4 UNIT(S): at 08:04

## 2019-04-24 RX ADMIN — Medication 2: at 08:04

## 2019-04-24 RX ADMIN — Medication 4 UNIT(S): at 17:02

## 2019-04-24 RX ADMIN — Medication 100 MILLIGRAM(S): at 13:07

## 2019-04-24 RX ADMIN — APIXABAN 2.5 MILLIGRAM(S): 2.5 TABLET, FILM COATED ORAL at 05:39

## 2019-04-24 RX ADMIN — Medication 4 UNIT(S): at 12:53

## 2019-04-24 RX ADMIN — PANTOPRAZOLE SODIUM 40 MILLIGRAM(S): 20 TABLET, DELAYED RELEASE ORAL at 05:40

## 2019-04-24 RX ADMIN — Medication 10 MILLIGRAM(S): at 11:50

## 2019-04-24 RX ADMIN — Medication 8: at 17:01

## 2019-04-24 NOTE — DISCHARGE NOTE PROVIDER - CARE PROVIDERS DIRECT ADDRESSES
,[email or web address]_Unknown,[email or web address]

## 2019-04-24 NOTE — PROGRESS NOTE ADULT - ATTENDING COMMENTS
Andrei Andujar MD  Division of [facility] Medicine  Cell: [phone]  Pager: [phone]  Office: [phone]
Patient seen and examined. Agree with above. Would resume anticoagulation as medically necessary and monitor CBC/blood counts. If bleeding recurs, would pursue colonoscopy. Otherwise, PPI daily as the most likely cause would be the hemorrhagic gastritic seen on EGD in setting of NSAID use. He should still have a colonoscopy at some point  outpatient for colon cancer screening.
Lian Rogers DO  Hospitalist  [phone]    If hgb remains stable and pt has BM, he can be DC later today.  Discussed with Mr. Simpson and TG Jonas and who will follow up these things.   Discharge time 39 minutes
Lian Rogers [facility]  [phone]
Lian Rogers [facility]  [phone]

## 2019-04-24 NOTE — PROGRESS NOTE ADULT - PROBLEM/PLAN-7
Line is busy. Results mailed to patient along with message from Dr. Mancilla.  
DISPLAY PLAN FREE TEXT

## 2019-04-24 NOTE — PROGRESS NOTE ADULT - SUBJECTIVE AND OBJECTIVE BOX
Patient is a 64y old  Male who presents with a chief complaint of Melena (2019 08:11)      INTERVAL HPI/OVERNIGHT EVENTS: Still now BM. Restarted AC yesterday.       Gastrointestinal hemorrhage  No pertinent family history in first degree relatives  Handoff  MEWS Score  Arrhythmia as indication for cardiac pacemaker replacement  HLD (hyperlipidemia)  Depression  Diabetes mellitus type 2 in obese  HTN (hypertension)  Diabetes mellitus type II, controlled, with no complications  Diabetes mellitus type II  Dyslipidemia  HTN (hypertension)  GI bleed  Aortic stenosis, severe  Atrial flutter  Need for prophylactic measure  Anxiety  HTN (hypertension)  Type 2 diabetes mellitus  Anemia due to blood loss  GI bleed  No significant past surgical history  RECTAL BLEED  90+  Atrial flutter  HTN (hypertension)      Review of Systems: 12 point review of systems otherwise negative  ( - )fevers/chills  ( - ) dyspnea  ( - ) cough  ( - ) chest pain  ( - ) palpatations  ( - ) dizziness/lightheadedness  ( - ) nausea/vomiting  ( - ) abd pain  ( - ) diarrhea  ( - ) melena  ( - ) hematochezia  ( - ) dysuria  ( - ) hematuria  ( - ) leg swelling  ( -) calf tenderness  ( - ) motor weakness  ( - ) extremity numbness  ( - ) back pain  ( + ) tolerating POs  ( + ) BM    MEDICATIONS  (STANDING):  apixaban 2.5 milliGRAM(s) Oral every 12 hours  dextrose 5%. 1000 milliLiter(s) (50 mL/Hr) IV Continuous <Continuous>  dextrose 50% Injectable 12.5 Gram(s) IV Push once  dextrose 50% Injectable 25 Gram(s) IV Push once  dextrose 50% Injectable 25 Gram(s) IV Push once  docusate sodium 100 milliGRAM(s) Oral three times a day  influenza   Vaccine 0.5 milliLiter(s) IntraMuscular once  insulin glargine Injectable (LANTUS) 15 Unit(s) SubCutaneous at bedtime  insulin lispro (HumaLOG) corrective regimen sliding scale   SubCutaneous three times a day before meals  insulin lispro (HumaLOG) corrective regimen sliding scale   SubCutaneous at bedtime  insulin lispro Injectable (HumaLOG) 4 Unit(s) SubCutaneous three times a day before meals  pantoprazole    Tablet 40 milliGRAM(s) Oral before breakfast  PARoxetine 40 milliGRAM(s) Oral daily  senna 2 Tablet(s) Oral at bedtime    MEDICATIONS  (PRN):  bisacodyl Suppository 10 milliGRAM(s) Rectal daily PRN Constipation  dextrose 40% Gel 15 Gram(s) Oral once PRN Blood Glucose LESS THAN 70 milliGRAM(s)/deciliter  glucagon  Injectable 1 milliGRAM(s) IntraMuscular once PRN Glucose LESS THAN 70 milligrams/deciliter      Allergies    No Known Drug Allergies  pollen (Rhinitis; Rhinorrhea; Eye Irritation)    Intolerances          Vital Signs Last 24 Hrs  T(C): 36.3 (2019 12:05), Max: 36.8 (2019 14:05)  T(F): 97.4 (2019 12:05), Max: 98.2 (2019 14:05)  HR: 60 (2019 12:05) (60 - 67)  BP: 138/73 (2019 12:05) (132/51 - 150/67)  BP(mean): --  RR: 18 (2019 12:05) (18 - 18)  SpO2: 97% (2019 12:05) (93% - 99%)  CAPILLARY BLOOD GLUCOSE      POCT Blood Glucose.: 221 mg/dL (2019 11:56)  POCT Blood Glucose.: 177 mg/dL (2019 07:26)  POCT Blood Glucose.: 208 mg/dL (2019 21:35)  POCT Blood Glucose.: 202 mg/dL (2019 16:39)       @ 07: @ 07:00  --------------------------------------------------------  IN: 1560 mL / OUT: 0 mL / NET: 1560 mL     @ 07: @ 12:13  --------------------------------------------------------  IN: 296 mL / OUT: 0 mL / NET: 296 mL        Physical Exam:    Daily     Daily Weight in k.7 (2019 12:38)  General:  NAD, non toxic, comfortable.   HEENT:  Nonicteric, PERRLA  CV:  RRR, no murmur  Lungs:  CTA B/L, no wheezes, rales, rhonchi  Abdomen:  Soft, non-tender, no distended, positive BS, more distended than yesterday but still soft.   Extremities:  no edema  Skin:  Warm and dry, no rashes  :  No loera  Neuro:  AAOx3, non-focal  No Restraints    LABS:                        8.3    10.6  )-----------( 253      ( 2019 10:37 )             25.6                   RADIOLOGY & ADDITIONAL TESTS:    ---------------------------------------------------------------------------  I personally reviewed: [  ]EKG   [  ]CXR    [  ] CT    [  ]Other  ---------------------------------------------------------------------------  PLEASE CHECK WHEN PRESENT:     [  ]Heart Failure     [  ] Acute     [  ] Acute on Chronic     [  ] Chronic  -------------------------------------------------------------------     [  ]Diastolic [HFpEF]     [  ]Systolic [HFrEF]     [  ]Combined [HFpEF & HFrEF]     [  ]Other:  -------------------------------------------------------------------  [  ]MARGARITA     [  ]ATN     [  ]Reneal Medullary Necrosis     [  ]Renal Cortical Necrosis     [  ]Other Pathological Lesions:    [  ]CKD 1  [  ]CKD 2  [  ]CKD 3  [  ]CKD 4  [  ]CKD 5  [  ]Other  -------------------------------------------------------------------  [  ]Other/Unspecified:    --------------------------------------------------------------------    Abdominal Nutritional Status  [  ]Malnutrition: See Nutrition Note  [  ]Cachexia  [  ]Other:   [  ]Supplement Ordered:  [  ]Morbid Obesity (BMI >=40]

## 2019-04-24 NOTE — DISCHARGE NOTE PROVIDER - CARE PROVIDER_API CALL
Jamie Hernandez)  Cardiovascular Disease; Internal Medicine  [address] [address]  Phone: [phone]  Fax: [phone]  Follow Up Time:     Volodymyr Ortiz)  Gastroenterology; Internal Medicine  [address] [place] [address]  Phone: [phone]  Fax: [phone]  Follow Up Time: Jamie Hernandez)  Cardiovascular Disease; Internal Medicine  [address] [place] [address]  Phone: [phone]  Fax: [phone]  Follow Up Time: 1 week    Volodymyr Ortiz)  Gastroenterology; Internal Medicine  [address] [place] [address]  Phone: [phone]  Fax: [phone]  Follow Up Time: 2 weeks

## 2019-04-24 NOTE — PROGRESS NOTE ADULT - PROBLEM SELECTOR PLAN 7
-c/w home paroxetine 40mg daily
-DVT prophylaxis with SCDs (holding home apixaban in setting of GIB)  -Clear liquid diet for now and NPO at [facility].   -CODE STATUS: Full code per previous discussion with patient and wife

## 2019-04-24 NOTE — PROGRESS NOTE ADULT - PROBLEM SELECTOR PLAN 6
-Will resume Lisinopril 20mg PO daily now
-hold home anti-HTN meds in setting of UGIB for now
-hold home anti-HTN meds in setting of UGIB for now
-c/w home paroxetine 40mg daily

## 2019-04-24 NOTE — PROGRESS NOTE ADULT - PROBLEM SELECTOR PLAN 2
Gastritis w/o active bleeding on EGD   . Pt taking ibuprofen once per week chronically advised to stop. On apixaban and ASA daily for atrial flutter that was previously held.  Will resume Eliquis today and monitor for bleeding.   -Pantoprazole 40mg PO daily   -Will trend CBC Q12H for now. Transfuse PRN Hgb < 7 or if having active melena  -Needs outpt colonoscopy for cancer screening and EGD w/ EUS for gastric nodule- discussed with wife and pt who understand the plan.
Gastritis w/o active bleeding on EGD due to NSAIDS- advised to stop  -Resumed A/C will resume aspirin now as well.  If still remains stable this afternoon likely D/C.   -Pantoprazole 40mg PO daily   -Transfuse PRN Hgb < 7 or if having active melena  -Needs outpt colonoscopy for cancer screening and EGD w/ EUS for gastric nodule- discussed with wife and pt who understand the plan.
Gastritis w/o active bleeding on EGD today  . Pt taking ibuprofen once per week chronically advised to stop. On apixaban and ASA daily for atrial flutter now being held until hgb stable.  -Pantoprazole 40mg PO daily   Advance diet   -Will trend CBC Q12H for now. Transfuse PRN Hgb < 7 or if having active melena  -Needs outpt colonoscopy for cancer screening and EGD w/ EUS for gastric nodule.
-Hgb 9 on admission. Last Hgb 2017 11.9 in [place]. No Hb seen on [employer].  -Trend cbc Q8H for now.   -Hgb down to 7.5-7.8 today.    -In view of cardiac disease and potential for more bleeding, will give 2 -1/2 units slowly over 3 hours each in view of severe AS.

## 2019-04-24 NOTE — PROGRESS NOTE ADULT - PROBLEM SELECTOR PLAN 1
Acute blood loss anemia 2/2 UGIB  -Hgb 9 on admission. Last Hgb 2017 11.9 in [place].   -Trend cbc Q8H for now and transfuse as above  -Received total of 3 units, most recent last night. No more episodes of melena per pt.   -In view of cardiac disease and potential for more bleeding, receives 1/2 units slowly over 3 hours each in view of severe AS.  -Resumed Eliquis lower dose per cardio recs 2.5mg PO BID and he will titrate outpt as long as pt remains stable. Hgb is still stable this am.  Will give suppository for BM and monitor CBC this afternoon and if still stable will D/c on Eliquis 2.5mg PO BID.
F/U TTE for eval of this  Appreciate cardio recs.
F/U TTE for eval of this  Appreciate cardio recs.
-Likely UGIB given hx of melena, guaiac +. Maybe 2/2 PUD vs GERD/gastritis vs malignancy (hx of smoking, unintentional weight loss, night sweats) vs AVM from aortic stenosis. Pt taking ibuprofen once per week chronically. On apixaban and ASA daily.  -C/w pantoprazole 40mg IV BID  -GI recs appreciated; tentative plan for EGD tomorrow. NPO at [facility]. Clears today.    -hold apixaban and ASA for now.   -Will trend CBC Q8H for now. Transfuse PRN.   -Cardiology consulted for pre-procedure "clearance", per GI recs.

## 2019-04-24 NOTE — DISCHARGE NOTE PROVIDER - PROVIDER TOKENS
PROVIDER:[TOKEN:[[id number]]],PROVIDER:[TOKEN:[[id number]]] PROVIDER:[TOKEN:[[id number]],FOLLOWUP:[1 week]],PROVIDER:[TOKEN:[[id number]],FOLLOWUP:[2 weeks]]

## 2019-04-24 NOTE — PROGRESS NOTE ADULT - ASSESSMENT
64 year old female with PMH of GERD, DM-2, HTN, anxiety, Atrial flutter (on apixaban), severe aortic stenosis, right arm fracture (2013, takes ibuprofen once per week prior to physical therapy), 3rd degree AV block s/p PPM (2013, last interrogated 03/19); admitted for melena and UGIB 2/2 gastritis found to have gastric nodule now restarted on A/C w/o GIB.

## 2019-04-24 NOTE — DISCHARGE NOTE PROVIDER - NSDCCAREPROVSEEN_GEN_ALL_CORE_FT
Jamie Hernandez Michelle  [facility] Medicine, Advance PracticeTeam  [facility] Gastroenterology, Team

## 2019-04-24 NOTE — PROGRESS NOTE ADULT - PROBLEM SELECTOR PLAN 5
-Hx of A flutter on apixaban at home  -Apixaban on hold for GI bleed but will resume today and monitor for bleeding
-Hx of A flutter on apixaban at home  -Apixaban on hold for GI bleed.
F/U TTE for eval of this as outpt and then based on results may need structural heart eval.   Appreciate cardio recs from yesterday- he will f/u with Dr. Hernandez as outpt.
-hold home anti-HTN meds in setting of UGIB for now

## 2019-04-24 NOTE — PROGRESS NOTE ADULT - PROBLEM SELECTOR PLAN 3
-Hx of A flutter on apixaban at home  -Apixaban 2.5mg PO BID resumed- cardio will uptitrate as outpt if pt remains w/o GIB.
Acute blood loss anemia 2/2 UGIB  -Hgb 9 on admission. Last Hgb 2017 11.9 in [place].   -Trend cbc Q8H for now and transfuse as above  -Received total of 3 units, most recent last night. No more episodes of melena per pt.   -In view of cardiac disease and potential for more bleeding, receives 1/2 units slowly over 3 hours each in view of severe AS.
Acute blood loss anemia 2/2 UGIB  -Hgb 9 on admission. Last Hgb 2017 11.9 in [place].   -Trend cbc Q8H for now and transfuse as above  -Received total of 3 units, most recent last night. No more episodes of melena per pt.   -In view of cardiac disease and potential for more bleeding, receives 1/2 units slowly over 3 hours each in view of severe AS.
-On insulin 30 QHS, metformin, Empagliflozin (Jardiance), glimepiride as outpt. Will hold PO diabetes meds for now.   -C/w insulin will half to 15units QHS since NPO at [facility], c/w moderate ISS. A1c 9.8. Unclear why patient is not on statin.

## 2019-04-24 NOTE — PROGRESS NOTE ADULT - PROBLEM SELECTOR PLAN 4
-On insulin 30 QHS, metformin, Empagliflozin (Jardiance), glimepiride as outpt. Will hold PO diabetes meds for now.   -C/w insulin will half to 15units QHS until hgb is stable and he is eating in case he needs to be placed NPO., c/w moderate ISS. A1c 9.8. Unclear why patient is not on statin.
-On insulin 30 QHS, metformin, Empagliflozin (Jardiance), glimepiride as outpt. Will hold PO diabetes meds for now.   -C/w insulin will half to 15units QHS until hgb is stable and he is eating in case he needs to be placed NPO., c/w moderate ISS. A1c 9.8. Unclear why patient is not on statin.
-On insulin 30 QHS, metformin, Empagliflozin (Jardiance), glimepiride as outpt. Will hold PO diabetes meds for now.   -Lantus 15units QHS- used 10units SS over last 24hrs so if pt stays he will need to increase Lantus to 20units QHS- can resume home Trajenta on D/C.
-Hx of A flutter on apixaban at home  -Apixaban on hold for GI bleed.

## 2019-04-24 NOTE — DISCHARGE NOTE PROVIDER - NSDCCPCAREPLAN_GEN_ALL_CORE_FT
PRINCIPAL DISCHARGE DIAGNOSIS  Diagnosis: GI bleed  Assessment and Plan of Treatment: There are 2 common types of GI Bleed, Upper GI Bleed and Lower GI Bleed.  Upper GI Bleed affects the esophagus, stomach, and first part of the small intestine. Lower GI Bleed affects the colon and rectum.  Upper GI Bleed signs and symptoms to notify your Health Care Provider are vomiting blood, or coffee ground vomitus, and bowel movements that look like black tar.  Lower GI Bleed signs and symptoms to notify your health care provider are bright red bloody bowel movements.   Take your medications as prescribed by your Gastroenterologist.  If you have had an Endoscopy or Colonoscopy, follow up with your Gastroenterologist for Pathology results.  Avoid NSAIDs unless your Health Care Provider tells you that it is ok (Aspirin, Ibuprofen, Advil, Motrin, Aleve).  Follow up with your Gastroenterologist within 1-2 weeks of discharge.      SECONDARY DISCHARGE DIAGNOSES  Diagnosis: Atrial flutter  Assessment and Plan of Treatment: It is important to take your heart medication as prescribed  You may be on anticoagulation which is very important to take as directed - you may need blood work to monitor drug levels    Diagnosis: HTN (hypertension)  Assessment and Plan of Treatment: Take medications for your blood pressure as recommended.  Eat a heart healthy diet that is low in saturated fats and salt, and includes whole grains, fruits, vegetables and lean protein   Exercise regularly (consult with your physician or cardiologist first); maintain a heart healthy weight.   If you smoke - quit (A resource to help you stop smoking is the [facility] – phone number 038-723-5599.). Continue to follow with your primary physician or cardiologist.   Seek medical help for dizziness, Lightheadedness, Blurry vision, Headache, Chest pain, Shortness of breath  Follow up with your medical doctor to establish long term blood pressure treatment goals. PRINCIPAL DISCHARGE DIAGNOSIS  Diagnosis: GI bleed  Assessment and Plan of Treatment: There are 2 common types of GI Bleed, Upper GI Bleed and Lower GI Bleed.  Upper GI Bleed affects the esophagus, stomach, and first part of the small intestine. Lower GI Bleed affects the colon and rectum.  Upper GI Bleed signs and symptoms to notify your Health Care Provider are vomiting blood, or coffee ground vomitus, and bowel movements that look like black tar.  Lower GI Bleed signs and symptoms to notify your health care provider are bright red bloody bowel movements.   Take your medications as prescribed by your Gastroenterologist.  If you have had an Endoscopy or Colonoscopy, follow up with your Gastroenterologist for Pathology results.  Avoid NSAIDs unless your Health Care Provider tells you that it is ok (Aspirin, Ibuprofen, Advil, Motrin, Aleve).  Follow up with your Gastroenterologist within 1-2 weeks of discharge.      SECONDARY DISCHARGE DIAGNOSES  Diagnosis: Atrial flutter  Assessment and Plan of Treatment: Your eliquis dose was lowered in the hospital. Follow up with Cardiologist to titrate back to your usually dose.   It is important to take your heart medication as prescribed  You may be on anticoagulation which is very important to take as directed - you may need blood work to monitor drug levels    Diagnosis: HTN (hypertension)  Assessment and Plan of Treatment: Take medications for your blood pressure as recommended.  Eat a heart healthy diet that is low in saturated fats and salt, and includes whole grains, fruits, vegetables and lean protein   Exercise regularly (consult with your physician or cardiologist first); maintain a heart healthy weight.   If you smoke - quit (A resource to help you stop smoking is the [facility] – phone number 052-897-1994.). Continue to follow with your primary physician or cardiologist.   Seek medical help for dizziness, Lightheadedness, Blurry vision, Headache, Chest pain, Shortness of breath  Follow up with your medical doctor to establish long term blood pressure treatment goals.

## 2019-04-24 NOTE — DISCHARGE NOTE PROVIDER - HOSPITAL COURSE
64 year old female with PMH of GERD, DM-2, HTN, anxiety, Atrial flutter (on apixaban), severe aortic stenosis, right arm fracture (2013, takes ibuprofen once per week prior to physical therapy), 3rd degree AV block s/p PPM (2013, last interrogated 03/19); admitted for melena and UGIB 2/2 gastritis found to have gastric nodule now restarted on A/C w/o GIB.   He will need to follow up with GI as an outpatietn for an EUS and biopsy of gastric nodule.  He and his wife were informed of this.  He should also have his Eliquis uptitrated back to 5mg PO BID with Dr. Hernandez    as an outpatient. He had 1 BM day prior to DC which had remanant blood but hgb and vitals are stable, will likely expect dark and maroon tinged stool for another day or so but if he develops any black tarry, bright red or deep maroon colored stool he should return to the ER for a CBC check. 64 year old female with PMH of GERD, DM-2, HTN, anxiety, Atrial flutter (on apixaban), severe aortic stenosis, right arm fracture (2013, takes ibuprofen once per week prior to physical therapy), 3rd degree AV block s/p PPM (2013, last interrogated 03/19); admitted for melena and UGIB 2/2 gastritis found to have gastric nodule now restarted on A/C w/o GIB.   He will need to follow up with GI as an outpatietn for an EUS and biopsy of gastric nodule.  He and his wife were informed of this.  He should also have his Eliquis uptitrated back to 5mg PO BID with Dr. Hernandez    as an outpatient. He had 1 BM day prior to DC which had remanant blood but hgb and vitals are stable, will likely expect dark and maroon tinged stool for another day or so but if he develops any black tarry, bright red or deep maroon colored stool he should return to the ER for a CBC check. He also needs to follow up with Dr. Hernandez for repeat echo for eval of his aortic stenosis.

## 2019-05-08 ENCOUNTER — APPOINTMENT (OUTPATIENT)
Dept: GASTROENTEROLOGY | Facility: CLINIC | Age: 64
End: 2019-05-08
Payer: MEDICARE

## 2019-05-08 ENCOUNTER — INPATIENT (INPATIENT)
Facility: HOSPITAL | Age: 64
LOS: 5 days | Discharge: HOME | DRG: 378 | End: 2019-05-14
Attending: HOSPITALIST | Admitting: HOSPITALIST
Payer: MEDICARE

## 2019-05-08 VITALS
WEIGHT: 214 LBS | SYSTOLIC BLOOD PRESSURE: 116 MMHG | HEART RATE: 59 BPM | OXYGEN SATURATION: 100 % | TEMPERATURE: 97.9 F | HEIGHT: 72 IN | DIASTOLIC BLOOD PRESSURE: 69 MMHG | BODY MASS INDEX: 28.99 KG/M2

## 2019-05-08 VITALS
RESPIRATION RATE: 19 BRPM | HEIGHT: 72 IN | TEMPERATURE: 98 F | OXYGEN SATURATION: 95 % | SYSTOLIC BLOOD PRESSURE: 101 MMHG | DIASTOLIC BLOOD PRESSURE: 63 MMHG | HEART RATE: 113 BPM | WEIGHT: 214.07 LBS

## 2019-05-08 DIAGNOSIS — D50.0 IRON DEFICIENCY ANEMIA SECONDARY TO BLOOD LOSS (CHRONIC): ICD-10-CM

## 2019-05-08 DIAGNOSIS — Z78.9 OTHER SPECIFIED HEALTH STATUS: ICD-10-CM

## 2019-05-08 DIAGNOSIS — R42 DIZZINESS AND GIDDINESS: ICD-10-CM

## 2019-05-08 DIAGNOSIS — K92.1 MELENA: ICD-10-CM

## 2019-05-08 DIAGNOSIS — E11.9 TYPE 2 DIABETES MELLITUS WITHOUT COMPLICATIONS: ICD-10-CM

## 2019-05-08 DIAGNOSIS — I48.92 UNSPECIFIED ATRIAL FLUTTER: ICD-10-CM

## 2019-05-08 DIAGNOSIS — Z80.3 FAMILY HISTORY OF MALIGNANT NEOPLASM OF BREAST: ICD-10-CM

## 2019-05-08 DIAGNOSIS — M79.10 MYALGIA, UNSPECIFIED SITE: ICD-10-CM

## 2019-05-08 DIAGNOSIS — I10 ESSENTIAL (PRIMARY) HYPERTENSION: ICD-10-CM

## 2019-05-08 DIAGNOSIS — F32.9 MAJOR DEPRESSIVE DISORDER, SINGLE EPISODE, UNSPECIFIED: ICD-10-CM

## 2019-05-08 DIAGNOSIS — I35.0 NONRHEUMATIC AORTIC (VALVE) STENOSIS: ICD-10-CM

## 2019-05-08 DIAGNOSIS — K92.2 GASTROINTESTINAL HEMORRHAGE, UNSPECIFIED: ICD-10-CM

## 2019-05-08 DIAGNOSIS — K25.9 GASTRIC ULCER, UNSPECIFIED AS ACUTE OR CHRONIC, W/OUT HEMORRHAGE OR PERFORATION: ICD-10-CM

## 2019-05-08 LAB
ALBUMIN SERPL ELPH-MCNC: 4.4 G/DL — SIGNIFICANT CHANGE UP (ref 3.3–5)
ALP SERPL-CCNC: 99 U/L — SIGNIFICANT CHANGE UP (ref 40–120)
ALT FLD-CCNC: 19 U/L — SIGNIFICANT CHANGE UP (ref 10–45)
ANION GAP SERPL CALC-SCNC: 16 MMOL/L — SIGNIFICANT CHANGE UP (ref 5–17)
APTT BLD: 32.7 SEC — SIGNIFICANT CHANGE UP (ref 27.5–36.3)
AST SERPL-CCNC: 28 U/L — SIGNIFICANT CHANGE UP (ref 10–40)
BASE EXCESS BLDV CALC-SCNC: -5.1 MMOL/L — LOW (ref -2–2)
BASOPHILS # BLD AUTO: 0 K/UL — SIGNIFICANT CHANGE UP (ref 0–0.2)
BASOPHILS NFR BLD AUTO: 0.3 % — SIGNIFICANT CHANGE UP (ref 0–2)
BILIRUB SERPL-MCNC: 0.3 MG/DL — SIGNIFICANT CHANGE UP (ref 0.2–1.2)
BUN SERPL-MCNC: 36 MG/DL — HIGH (ref 7–23)
CA-I SERPL-SCNC: 1.24 MMOL/L — SIGNIFICANT CHANGE UP (ref 1.12–1.3)
CALCIUM SERPL-MCNC: 9.5 MG/DL — SIGNIFICANT CHANGE UP (ref 8.4–10.5)
CHLORIDE BLDV-SCNC: 102 MMOL/L — SIGNIFICANT CHANGE UP (ref 96–108)
CHLORIDE SERPL-SCNC: 98 MMOL/L — SIGNIFICANT CHANGE UP (ref 96–108)
CO2 BLDV-SCNC: 22 MMOL/L — SIGNIFICANT CHANGE UP (ref 22–30)
CO2 SERPL-SCNC: 18 MMOL/L — LOW (ref 22–31)
CREAT SERPL-MCNC: 1.24 MG/DL — SIGNIFICANT CHANGE UP (ref 0.5–1.3)
EOSINOPHIL # BLD AUTO: 0.1 K/UL — SIGNIFICANT CHANGE UP (ref 0–0.5)
EOSINOPHIL NFR BLD AUTO: 0.7 % — SIGNIFICANT CHANGE UP (ref 0–6)
GAS PNL BLDV: 131 MMOL/L — LOW (ref 136–145)
GAS PNL BLDV: SIGNIFICANT CHANGE UP
GLUCOSE BLDC GLUCOMTR-MCNC: 137 MG/DL — HIGH (ref 70–99)
GLUCOSE BLDV-MCNC: 224 MG/DL — HIGH (ref 70–99)
GLUCOSE SERPL-MCNC: 222 MG/DL — HIGH (ref 70–99)
HCO3 BLDV-SCNC: 20 MMOL/L — LOW (ref 21–29)
HCT VFR BLD CALC: 20.6 % — CRITICAL LOW (ref 39–50)
HCT VFR BLD CALC: 21.9 % — LOW (ref 39–50)
HCT VFR BLD CALC: 23.5 % — LOW (ref 39–50)
HCT VFR BLDA CALC: 21 % — CRITICAL LOW (ref 39–50)
HGB BLD CALC-MCNC: 6.8 G/DL — CRITICAL LOW (ref 13–17)
HGB BLD-MCNC: 6.4 G/DL — CRITICAL LOW (ref 13–17)
HGB BLD-MCNC: 6.5 G/DL — CRITICAL LOW (ref 13–17)
HGB BLD-MCNC: 7.5 G/DL — LOW (ref 13–17)
INR BLD: 1.24 RATIO — HIGH (ref 0.88–1.16)
LACTATE BLDV-MCNC: 3.2 MMOL/L — HIGH (ref 0.7–2)
LYMPHOCYTES # BLD AUTO: 14.2 % — SIGNIFICANT CHANGE UP (ref 13–44)
LYMPHOCYTES # BLD AUTO: 2.2 K/UL — SIGNIFICANT CHANGE UP (ref 1–3.3)
MCHC RBC-ENTMCNC: 23 PG — LOW (ref 27–34)
MCHC RBC-ENTMCNC: 23.9 PG — LOW (ref 27–34)
MCHC RBC-ENTMCNC: 24.2 PG — LOW (ref 27–34)
MCHC RBC-ENTMCNC: 29.2 GM/DL — LOW (ref 32–36)
MCHC RBC-ENTMCNC: 31.3 GM/DL — LOW (ref 32–36)
MCHC RBC-ENTMCNC: 31.9 GM/DL — LOW (ref 32–36)
MCV RBC AUTO: 75.8 FL — LOW (ref 80–100)
MCV RBC AUTO: 76.4 FL — LOW (ref 80–100)
MCV RBC AUTO: 78.8 FL — LOW (ref 80–100)
MONOCYTES # BLD AUTO: 1.6 K/UL — HIGH (ref 0–0.9)
MONOCYTES NFR BLD AUTO: 10.2 % — SIGNIFICANT CHANGE UP (ref 2–14)
NEUTROPHILS # BLD AUTO: 11.6 K/UL — HIGH (ref 1.8–7.4)
NEUTROPHILS NFR BLD AUTO: 74.7 % — SIGNIFICANT CHANGE UP (ref 43–77)
NRBC # BLD: 2 /100 WBCS — HIGH (ref 0–0)
OB PNL STL: POSITIVE
PCO2 BLDV: 42 MMHG — SIGNIFICANT CHANGE UP (ref 35–50)
PH BLDV: 7.31 — LOW (ref 7.35–7.45)
PLATELET # BLD AUTO: 287 K/UL — SIGNIFICANT CHANGE UP (ref 150–400)
PLATELET # BLD AUTO: 363 K/UL — SIGNIFICANT CHANGE UP (ref 150–400)
PLATELET # BLD AUTO: 377 K/UL — SIGNIFICANT CHANGE UP (ref 150–400)
PO2 BLDV: 30 MMHG — SIGNIFICANT CHANGE UP (ref 25–45)
POTASSIUM BLDV-SCNC: 4.7 MMOL/L — SIGNIFICANT CHANGE UP (ref 3.5–5.3)
POTASSIUM SERPL-MCNC: 4.9 MMOL/L — SIGNIFICANT CHANGE UP (ref 3.5–5.3)
POTASSIUM SERPL-SCNC: 4.9 MMOL/L — SIGNIFICANT CHANGE UP (ref 3.5–5.3)
PROT SERPL-MCNC: 7.7 G/DL — SIGNIFICANT CHANGE UP (ref 6–8.3)
PROTHROM AB SERPL-ACNC: 14.4 SEC — HIGH (ref 10–12.9)
RBC # BLD: 2.7 M/UL — LOW (ref 4.2–5.8)
RBC # BLD: 2.78 M/UL — LOW (ref 4.2–5.8)
RBC # BLD: 3.1 M/UL — LOW (ref 4.2–5.8)
RBC # FLD: 16.5 % — HIGH (ref 10.3–14.5)
RBC # FLD: 18 % — HIGH (ref 10.3–14.5)
RBC # FLD: 18.2 % — HIGH (ref 10.3–14.5)
SAO2 % BLDV: 39 % — LOW (ref 67–88)
SODIUM SERPL-SCNC: 132 MMOL/L — LOW (ref 135–145)
TROPONIN T, HIGH SENSITIVITY RESULT: 35 NG/L — SIGNIFICANT CHANGE UP (ref 0–51)
TROPONIN T, HIGH SENSITIVITY RESULT: 37 NG/L — SIGNIFICANT CHANGE UP (ref 0–51)
WBC # BLD: 12.2 K/UL — HIGH (ref 3.8–10.5)
WBC # BLD: 14.4 K/UL — HIGH (ref 3.8–10.5)
WBC # BLD: 15.5 K/UL — HIGH (ref 3.8–10.5)
WBC # FLD AUTO: 12.2 K/UL — HIGH (ref 3.8–10.5)
WBC # FLD AUTO: 14.4 K/UL — HIGH (ref 3.8–10.5)
WBC # FLD AUTO: 15.5 K/UL — HIGH (ref 3.8–10.5)

## 2019-05-08 PROCEDURE — 99213 OFFICE O/P EST LOW 20 MIN: CPT

## 2019-05-08 PROCEDURE — 71045 X-RAY EXAM CHEST 1 VIEW: CPT | Mod: 26

## 2019-05-08 PROCEDURE — 99203 OFFICE O/P NEW LOW 30 MIN: CPT | Mod: PD

## 2019-05-08 PROCEDURE — 99285 EMERGENCY DEPT VISIT HI MDM: CPT | Mod: 25

## 2019-05-08 PROCEDURE — 99223 1ST HOSP IP/OBS HIGH 75: CPT | Mod: AI

## 2019-05-08 PROCEDURE — 93010 ELECTROCARDIOGRAM REPORT: CPT

## 2019-05-08 RX ORDER — OMEPRAZOLE 40 MG/1
40 CAPSULE, DELAYED RELEASE ORAL
Qty: 30 | Refills: 2 | Status: ACTIVE | COMMUNITY
Start: 2019-05-08 | End: 1900-01-01

## 2019-05-08 RX ORDER — GLUCAGON INJECTION, SOLUTION 0.5 MG/.1ML
1 INJECTION, SOLUTION SUBCUTANEOUS ONCE
Qty: 0 | Refills: 0 | Status: DISCONTINUED | OUTPATIENT
Start: 2019-05-08 | End: 2019-05-14

## 2019-05-08 RX ORDER — INSULIN GLARGINE 100 [IU]/ML
30 INJECTION, SOLUTION SUBCUTANEOUS
Qty: 0 | Refills: 0 | COMMUNITY

## 2019-05-08 RX ORDER — INSULIN GLARGINE 100 [IU]/ML
30 INJECTION, SOLUTION SUBCUTANEOUS AT BEDTIME
Qty: 0 | Refills: 0 | Status: DISCONTINUED | OUTPATIENT
Start: 2019-05-08 | End: 2019-05-08

## 2019-05-08 RX ORDER — DEXTROSE 50 % IN WATER 50 %
25 SYRINGE (ML) INTRAVENOUS ONCE
Qty: 0 | Refills: 0 | Status: DISCONTINUED | OUTPATIENT
Start: 2019-05-08 | End: 2019-05-14

## 2019-05-08 RX ORDER — DEXTROSE 50 % IN WATER 50 %
12.5 SYRINGE (ML) INTRAVENOUS ONCE
Qty: 0 | Refills: 0 | Status: DISCONTINUED | OUTPATIENT
Start: 2019-05-08 | End: 2019-05-14

## 2019-05-08 RX ORDER — PANTOPRAZOLE SODIUM 20 MG/1
40 TABLET, DELAYED RELEASE ORAL EVERY 12 HOURS
Qty: 0 | Refills: 0 | Status: DISCONTINUED | OUTPATIENT
Start: 2019-05-08 | End: 2019-05-13

## 2019-05-08 RX ORDER — INSULIN LISPRO 100/ML
VIAL (ML) SUBCUTANEOUS
Qty: 0 | Refills: 0 | Status: DISCONTINUED | OUTPATIENT
Start: 2019-05-08 | End: 2019-05-13

## 2019-05-08 RX ORDER — SODIUM CHLORIDE 9 MG/ML
1000 INJECTION, SOLUTION INTRAVENOUS
Qty: 0 | Refills: 0 | Status: DISCONTINUED | OUTPATIENT
Start: 2019-05-08 | End: 2019-05-14

## 2019-05-08 RX ORDER — PANTOPRAZOLE SODIUM 20 MG/1
40 TABLET, DELAYED RELEASE ORAL
Qty: 0 | Refills: 0 | Status: DISCONTINUED | OUTPATIENT
Start: 2019-05-08 | End: 2019-05-08

## 2019-05-08 RX ORDER — DEXTROSE 50 % IN WATER 50 %
15 SYRINGE (ML) INTRAVENOUS ONCE
Qty: 0 | Refills: 0 | Status: DISCONTINUED | OUTPATIENT
Start: 2019-05-08 | End: 2019-05-14

## 2019-05-08 RX ADMIN — Medication 40 MILLIGRAM(S): at 23:48

## 2019-05-08 RX ADMIN — PANTOPRAZOLE SODIUM 40 MILLIGRAM(S): 20 TABLET, DELAYED RELEASE ORAL at 20:10

## 2019-05-08 NOTE — H&P ADULT - PROBLEM SELECTOR PLAN 1
cbc q8hr, had egd showing gastritis 4/2019 but c scope not performed due to as  gi c/s emailed  keep clears for now, npo at midnight  transfuse if Hb ,7 would do 1/2 units since severe as  hold off on IVF  hold asa, and ac

## 2019-05-08 NOTE — H&P ADULT - ASSESSMENT
64 year old male with a PMH of GERD, T2DM, HTN, anxiety, Atrial flutter (on apixaban), severe aortic stenosis, third degree av block s/p PPM p/w weaknes found to have decreased Hb concern for acute blood loss anemia

## 2019-05-08 NOTE — HISTORY OF PRESENT ILLNESS
[_________] : Performed [unfilled] [de-identified] : This is a 64 year old male with HTN, DM2, anxiety, A flutter on Apixaban, moderate AS, arm fracture previously on Ibuprofen, s/p PPM, who presents for follow-up after recent hospitalization for melena.\par \par The patient was admitted to [facility] from 4/20/19 to 4/24/19 for melena x 2 days. He required 1 U of PRBC total on 4/21/19 for Hgb the lowest of which was 7.5. He underwent an EGD where he was found to have many small gastric erosions, likely the source of bleeding. He also had a small antral submucosal nodule that EUS was recommended as outpatient. He has never had a colonoscopy before. He was discharged on Protonix daily. The patient reports return of his stools from melena in the hospital back to brown now. He denies any abdominal pain, nausea/vomiting. He has stopped all NSAIDs now other than baby aspirin. He is still on Eliquis. He is not on a PPI - he reports he was not given it as a prescription on discharge. He reports dizziness and light headedness but no syncope - these symptoms have been present since the hospitalization and has not gone away. He also reports some muscle aches. He reports no orthopnea or actual chest pain.  \par \par Recent labs:\par \par 4/24/19:\par \par WBC 10.6, Hgb 8.3, Hct 25.6, , MCV 82\par \par 4/22/19: Na 139, K 4, Cl 105, Bicarb 24, BUN 24, Cr 1.05, Glucose 100, Ca 8.7\par  \par \par 4/20/19: Total protein 7.8, Albumin 4.4, T bili 0.2, ALP 96, AST 21, ALT 15 [de-identified] : EGD:\par \par Findings:\par \par      The examined esophagus was normal.\par \par      The Z-line was regular and was found 37 cm from the incisors.\par \par      Scattered moderate inflammation characterized by erosions was found at the incisura, in the\par \par      gastric antrum and in the prepyloric region of the stomach. No active bleeding was seen.\par \par      Biopsies were taken with a cold forceps for histology from the antrum, incisura and body for\par \par      H pylori testing.\par \par      A single medium papule (nodule) about 1 cm in size with no stigmata of recent bleeding was\par \par      found in the prepyloric region of the stomach.\par \par      The exam of the stomach was otherwise normal.\par \par      The duodenal bulb and 2nd part of the duodenum were normal.\par \par                                                                                                    \par \par Impression:          - Normal esophagus.\par \par                      - Z-line regular, 37 cm from the incisors.\par \par                      - Erosive gastritis. Biopsied. Most likely the cause of melena.\par \par                      - Gastric nodule.\par \par                      - Normal duodenal bulb and 2nd part of the duodenum.\par \par Recommendation:      - Return patient to [facility] for [facility] care.\par \par                      - Use Protonix (pantoprazole) 40 mg PO daily.\par \par                      - Await pathology results.\par \par                      - Advance diet as tolerated.\par \par                      - Monitor blood counts.\par \par                      - Outpatient GI follow up for EUS of the gastric nodule.\par \par                      - Colonoscopy as outpatient for colon cancer screening, unless melena\par \par                      continues then this would have to be done inpatient.\par \par  \par \par Pathology:\par \par Final Diagnosis\par \par Stomach, biopsy\par \par - Gastric antral type mucosa with focal erosion\par \par - Gastric oxyntic mucosa with mild chronic inactive\par \par gastritis\par \par - Negative for Helicobacter microorganisms ([place]-Starry\par \par stain)

## 2019-05-08 NOTE — ED ADULT NURSE REASSESSMENT NOTE - NS ED NURSE REASSESS COMMENT FT1
Report received from KO Engle. Pt a&oX4 resting comfortably in chair next to bed at this time. Pt escorted back into bed and provided with red socks for fall risk. Pt educated on how to call for assistance with getting out of bed, verbalizes understanding. VSS. Pt with no complaints at this time. IV site remains intact with no redness or swelling noted to site. Report given to KO Acevedo in ED holding. Safety measures maintained.

## 2019-05-08 NOTE — PHYSICAL EXAM
[General Appearance - In No Acute Distress] : in no acute distress [General Appearance - Alert] : alert [Neck Appearance] : the appearance of the neck was normal [Sclera] : the sclera and conjunctiva were normal [] : no respiratory distress [Exaggerated Use Of Accessory Muscles For Inspiration] : no accessory muscle use [Heart Rate And Rhythm] : heart rate was normal and rhythm regular [Heart Sounds] : normal S1 and S2 [Auscultation Breath Sounds / Voice Sounds] : lungs were clear to auscultation bilaterally [Edema] : there was no peripheral edema [Murmurs] : no murmurs [Abdomen Tenderness] : non-tender [Abdomen Mass (___ Cm)] : no abdominal mass palpated [Abdomen Soft] : soft [Involuntary Movements] : no involuntary movements were seen [FreeTextEntry1] : Unsteady gait [No Focal Deficits] : no focal deficits [Oriented To Time, Place, And Person] : oriented to person, place, and time [Affect] : the affect was normal [Mood] : the mood was normal

## 2019-05-08 NOTE — ED PROVIDER NOTE - OBJECTIVE STATEMENT
64 year old male with a PMH of GERD, T2DM, HTN, anxiety, Atrial flutter (on apixaban), aortic stenosis, s/p PPM who presents to ED c/o weakness and lightheaded sensation this morning. Patient recently admitted for UGIB 4/20-4/24 secondary to gastritis. States since d/c stools have normalized. This morning upon getting up felt generally weak and dizzy. Presented to GI for repeat blood work and was advised to come to ED given symptoms. Denies fevers, chills, chest pain, sob, abdominal pain, n/v/d, melena, brbpr

## 2019-05-08 NOTE — H&P ADULT - HISTORY OF PRESENT ILLNESS
64 year old male with a PMH of GERD, T2DM, HTN, anxiety, Atrial flutter (on apixaban), severe aortic stenosis, third degree av block s/p PPM p/w weakness x 3 weeks. Patient has been having increasing weakness for the past three weeks, since he was discharged. He also is endorsing black stools intermittenly but stating that one of his doctors told him that this would be residual from his last GI bleed. He denies any LOC, chest pain. He does feel shipley and previously had unlimited ET but now can only walk a few feet without feeling weak or sob. he denies chest pain. no leg swelling, sleeps with one pillow and no pnd. he last saw black stools 3 days ago. he went to his gi doctor today who told him to come to the ed

## 2019-05-08 NOTE — ASSESSMENT
[FreeTextEntry1] : Impression:\par 1. Recent melena due to NSAID induced gastric erosions - now resolved\par 2. Dizziness - unclear etiology, need to first rule out continued anemia/blood loss, vs cardiac disease given his history of AS, pacemaker history\par \par Plan:\par -Patient prescribed PPI daily to heal the gastric erosions\par -Avoid NSAIDs\par -The patient reports dizziness and unsteadiness - patient sent to the ER for blood work and evaluation now directly from the office to the ER, as he could have worsening anemia that requires more immediate treatment\par -If anemia is stable/improved, likely needs cardiac evaluation\par -The ER was made aware of patient's arrival

## 2019-05-08 NOTE — ED PROVIDER NOTE - PHYSICAL EXAMINATION
CONSTITUTIONAL: Patient is awake, alert and oriented x 3. Patient is well appearing and in no acute distress.  HEAD: NCAT,   EYES: PERRL b/l, EOMI,   ENT: Airway patent, Nasal mucosa clear. Mouth with normal mucosa. Throat has no vesicles, no oropharyngeal exudates and uvula is midline.  NECK: supple, No LAD,  LUNGS: CTA B/L,  HEART: RRR.+S1S2 no murmurs,   ABDOMEN: Soft nd/nt+bs no rebound or guarding.   EXTREMITY: no edema or calf tenderness b/l, FROM upper and lower ext b/l  SKIN: with no rash or lesions.   NEURO: No focal deficits CONSTITUTIONAL: Patient is awake, alert and oriented x 3. Patient is well appearing and in no acute distress.  HEAD: NCAT,   EYES: PERRL b/l, EOMI,   ENT: Airway patent, Nasal mucosa clear. Mouth with normal mucosa. Throat has no vesicles, no oropharyngeal exudates and uvula is midline.  NECK: supple, No LAD,  LUNGS: CTA B/L,  HEART: RRR.+S1S2 + murmur,   ABDOMEN: Soft nd/nt+bs no rebound or guarding.   EXTREMITY: no edema or calf tenderness b/l, FROM upper and lower ext b/l  SKIN: with no rash or lesions.   NEURO: No focal deficits

## 2019-05-08 NOTE — H&P ADULT - NSHPLABSRESULTS_GEN_ALL_CORE
CBC Full  -  ( 08 May 2019 12:12 )  WBC Count : 15.5 K/uL  RBC Count : 3.10 M/uL  Hemoglobin : 7.5 g/dL  Hematocrit : 23.5 %  Platelet Count - Automated : 377 K/uL  Mean Cell Volume : 75.8 fl  Mean Cell Hemoglobin : 24.2 pg  Mean Cell Hemoglobin Concentration : 31.9 gm/dL  Auto Neutrophil # : 11.6 K/uL  Auto Lymphocyte # : 2.2 K/uL  Auto Monocyte # : 1.6 K/uL  Auto Eosinophil # : 0.1 K/uL  Auto Basophil # : 0.0 K/uL  Auto Neutrophil % : 74.7 %  Auto Lymphocyte % : 14.2 %  Auto Monocyte % : 10.2 %  Auto Eosinophil % : 0.7 %  Auto Basophil % : 0.3 %    05-08    132<L>  |  98  |  36<H>  ----------------------------<  222<H>  4.9   |  18<L>  |  1.24    Ca    9.5      08 May 2019 12:12    TPro  7.7  /  Alb  4.4  /  TBili  0.3  /  DBili  x   /  AST  28  /  ALT  19  /  AlkPhos  99  05-08      'cxr no obvious infiltrate    ekg paced

## 2019-05-08 NOTE — ED PROVIDER NOTE - ATTENDING CONTRIBUTION TO CARE
64 year old female with PMH of GERD, DM-2, HTN, anxiety, Atrial flutter (on apixaban), severe aortic stenosis, right arm fracture (2013, takes ibuprofen once per week prior to physical therapy), 3rd degree AV block s/p PPM (2013, last interrogated 03/19); admitted for melena and UGIB 2/2 gastritis found to have gastric nodule now restarted on A/C w/o GIB.  presents with light headedness since he left the hospital, unclear if its recurrent gib, denies melena, sent down by his gi for labs, to check h/h, cardiac work up, ekg.

## 2019-05-08 NOTE — ED ADULT NURSE NOTE - OBJECTIVE STATEMENT
Patient presents to Ed with c/o sob when sitting up and lightheadedness. Patient on Eliquis reports he was admitted  here 2.5 weeks ago for rectal bleeding and now feels weak lightheaded and sob on movement. Patient A&Ox3 denies  chest pain +sob when positioning himself c/o weakness, no headache fever chills nausea vomiting or lower ext swelling.  Rt wrist 20g iv lock placed, labs drawn, comfort measures provided.

## 2019-05-09 DIAGNOSIS — Z01.818 ENCOUNTER FOR OTHER PREPROCEDURAL EXAMINATION: ICD-10-CM

## 2019-05-09 LAB
ANION GAP SERPL CALC-SCNC: 10 MMOL/L — SIGNIFICANT CHANGE UP (ref 5–17)
BLD GP AB SCN SERPL QL: NEGATIVE — SIGNIFICANT CHANGE UP
BUN SERPL-MCNC: 23 MG/DL — SIGNIFICANT CHANGE UP (ref 7–23)
CALCIUM SERPL-MCNC: 8.5 MG/DL — SIGNIFICANT CHANGE UP (ref 8.4–10.5)
CHLORIDE SERPL-SCNC: 100 MMOL/L — SIGNIFICANT CHANGE UP (ref 96–108)
CO2 SERPL-SCNC: 21 MMOL/L — LOW (ref 22–31)
CREAT SERPL-MCNC: 1.31 MG/DL — HIGH (ref 0.5–1.3)
GLUCOSE BLDC GLUCOMTR-MCNC: 108 MG/DL — HIGH (ref 70–99)
GLUCOSE BLDC GLUCOMTR-MCNC: 142 MG/DL — HIGH (ref 70–99)
GLUCOSE BLDC GLUCOMTR-MCNC: 166 MG/DL — HIGH (ref 70–99)
GLUCOSE BLDC GLUCOMTR-MCNC: 213 MG/DL — HIGH (ref 70–99)
GLUCOSE SERPL-MCNC: 249 MG/DL — HIGH (ref 70–99)
HCT VFR BLD CALC: 25.1 % — LOW (ref 39–50)
HCT VFR BLD CALC: 26 % — LOW (ref 39–50)
HGB BLD-MCNC: 7.6 G/DL — LOW (ref 13–17)
HGB BLD-MCNC: 7.9 G/DL — LOW (ref 13–17)
MCHC RBC-ENTMCNC: 24.1 PG — LOW (ref 27–34)
MCHC RBC-ENTMCNC: 24.7 PG — LOW (ref 27–34)
MCHC RBC-ENTMCNC: 29.2 GM/DL — LOW (ref 32–36)
MCHC RBC-ENTMCNC: 31.6 GM/DL — LOW (ref 32–36)
MCV RBC AUTO: 78.2 FL — LOW (ref 80–100)
MCV RBC AUTO: 82.3 FL — SIGNIFICANT CHANGE UP (ref 80–100)
PLATELET # BLD AUTO: 308 K/UL — SIGNIFICANT CHANGE UP (ref 150–400)
PLATELET # BLD AUTO: 325 K/UL — SIGNIFICANT CHANGE UP (ref 150–400)
POTASSIUM SERPL-MCNC: 4.5 MMOL/L — SIGNIFICANT CHANGE UP (ref 3.5–5.3)
POTASSIUM SERPL-SCNC: 4.5 MMOL/L — SIGNIFICANT CHANGE UP (ref 3.5–5.3)
RBC # BLD: 3.16 M/UL — LOW (ref 4.2–5.8)
RBC # BLD: 3.21 M/UL — LOW (ref 4.2–5.8)
RBC # FLD: 16 % — HIGH (ref 10.3–14.5)
RBC # FLD: 18.2 % — HIGH (ref 10.3–14.5)
RH IG SCN BLD-IMP: POSITIVE — SIGNIFICANT CHANGE UP
SODIUM SERPL-SCNC: 131 MMOL/L — LOW (ref 135–145)
WBC # BLD: 12.9 K/UL — HIGH (ref 3.8–10.5)
WBC # BLD: 14.2 K/UL — HIGH (ref 3.8–10.5)
WBC # FLD AUTO: 12.9 K/UL — HIGH (ref 3.8–10.5)
WBC # FLD AUTO: 14.2 K/UL — HIGH (ref 3.8–10.5)

## 2019-05-09 PROCEDURE — 99223 1ST HOSP IP/OBS HIGH 75: CPT | Mod: GC

## 2019-05-09 PROCEDURE — 93306 TTE W/DOPPLER COMPLETE: CPT | Mod: 26

## 2019-05-09 PROCEDURE — 99222 1ST HOSP IP/OBS MODERATE 55: CPT | Mod: GC

## 2019-05-09 PROCEDURE — 99233 SBSQ HOSP IP/OBS HIGH 50: CPT

## 2019-05-09 RX ORDER — SOD SULF/SODIUM/NAHCO3/KCL/PEG
1000 SOLUTION, RECONSTITUTED, ORAL ORAL EVERY 4 HOURS
Refills: 0 | Status: COMPLETED | OUTPATIENT
Start: 2019-05-09 | End: 2019-05-09

## 2019-05-09 RX ORDER — FUROSEMIDE 40 MG
20 TABLET ORAL ONCE
Refills: 0 | Status: COMPLETED | OUTPATIENT
Start: 2019-05-09 | End: 2019-05-09

## 2019-05-09 RX ADMIN — PANTOPRAZOLE SODIUM 40 MILLIGRAM(S): 20 TABLET, DELAYED RELEASE ORAL at 17:16

## 2019-05-09 RX ADMIN — Medication 1000 MILLILITER(S): at 19:18

## 2019-05-09 RX ADMIN — Medication 1000 MILLILITER(S): at 21:54

## 2019-05-09 RX ADMIN — Medication 20 MILLIGRAM(S): at 16:15

## 2019-05-09 RX ADMIN — PANTOPRAZOLE SODIUM 40 MILLIGRAM(S): 20 TABLET, DELAYED RELEASE ORAL at 05:16

## 2019-05-09 RX ADMIN — Medication 4: at 18:07

## 2019-05-09 RX ADMIN — Medication 40 MILLIGRAM(S): at 12:02

## 2019-05-09 RX ADMIN — Medication 2: at 12:01

## 2019-05-09 NOTE — PROGRESS NOTE ADULT - PROBLEM SELECTOR PLAN 5
obtain 2d ECHO given decreased ET and weakness and sob   - cardiology consult appreciated rec would avoid general anesthesia and no cardiac contraindication to proceeding with colonoscopy given anemia and active GIB  -continue to hold ASA and Apixaban in setting of GIB  - f/u ECHO results as may need evaluation for TAVR with structural heart once anemia and GIB addressed

## 2019-05-09 NOTE — CONSULT NOTE ADULT - ASSESSMENT
Impression:    1. Anemia with recent episode of acute blood loss anemia with melena: Currently with no overt bleeding, HD stable, however, with Hgb of 6.5. Upper endoscopy on 4/22/19 revealing erosive gastropathy, which was the presumed source of bleeding. Differential diagnosis includes right colonic lesions including colorectal malignancy, colonic angioectasias, and colonic Dieulafoy's lesion. Differential also includes small bowel angioectasias in the setting of severe AS (Heyde's syndrome).   2. Severe AS  3. A-fib on apixaban: AC on hold in setting of anemia  4. 3rd degree AV block s/p PPM  5. DM  6. HTN  7. GERD    Recommendations:  - Plan for colonoscopy +/- VCE tomorrow pending Cardiology risk assessment and optimization  - Cardiology consult   - Clear liquid diet today  - NPO after midnight  - GI fellow to order bowel preparation  - Monitor CBCs q12h  - Transfuse for goal Hgb >/= 7.0, transfuse cautiously in setting of severe AS  - Rest of care per primary team    Keenan Arias MD  Gastroenterology Fellow  Pager number: [phone] / [phone]

## 2019-05-09 NOTE — CHART NOTE - NSCHARTNOTEFT_GEN_A_CORE
Notified by RN critical labs of H/H 6.4/21.9. Last H/H prior was 7.5/23.5.  As per H&P, transfuse pt for hgb < 7 with half units due to severe AS.   Hospitalist on call notified.   Two half units of PRBCs ordered.   Discussed with patient and consent obtained - on chart.   RN aware.       Randa Gilman [facility]  [phone]

## 2019-05-09 NOTE — CONSULT NOTE ADULT - SUBJECTIVE AND OBJECTIVE BOX
Reason for Consultation: Preoperative Risk Assessment  Chief Complaint: Weakness  Date of Admission: 5/8/10      HPI:  63 y/o M w/ hx of DM, HTN, GERD, A-fib on apixaban, severe AS, 3rd degree AV block s/p PPM with recent hospitalization for acute blood loss anemia with melena, presenting with weakness and found to be anemic; GI consulted for evaluation.    Mr. Simpson endorses feeling weak and fatigue over the past three weeks, which has not resolved since his recent hospitalization. He admitted from 4/20/19 to 4/24/19 for evaluation of acute blood loss with anemia. He underwent upper endoscopy revealing erosive gastropathy which was thought to represent the patient's source of bleeding. The patient no longer endorses black tarry stools, stating his last episode of "dark stool" was 1 week ago. He otherwise denies bloody stools, bloody emesis, and coffee ground emesis. He denies nausea/vomiting and abdominal pain. The patient takes aspirin and denied other NSAID use, but previously endorsed Ibuprofen use for right arm pain after a fracture.     He has never had a colonoscopy.    Cardiology consulted for pre-operative risk assessment. Patient seen and examined. Vitals: HR 68 and /76. SOB and weakness resolved after transfusion. No chest pain. Exam notable for Severe AS and mild basilar crackles. No edema.       Past Medical History:   Arrhythmia as indication for cardiac pacemaker replacement  HLD (hyperlipidemia)  Depression  Diabetes mellitus type 2 in obese  HTN (hypertension)  Diabetes mellitus type II, controlled, with no complications  Diabetes mellitus type II  Dyslipidemia  HTN (hypertension)      Past Surgical History:   No significant past surgical history      Medications:   dextrose 40% Gel 15 Gram(s) Oral once PRN  dextrose 5%. 1000 milliLiter(s) IV Continuous <Continuous>  dextrose 50% Injectable 12.5 Gram(s) IV Push once  dextrose 50% Injectable 25 Gram(s) IV Push once  dextrose 50% Injectable 25 Gram(s) IV Push once  glucagon  Injectable 1 milliGRAM(s) IntraMuscular once PRN  insulin lispro (HumaLOG) corrective regimen sliding scale   SubCutaneous three times a day before meals  pantoprazole  Injectable 40 milliGRAM(s) IV Push every 12 hours  PARoxetine 40 milliGRAM(s) Oral daily      Allergies:  No Known Drug Allergies  pollen (Rhinitis; Rhinorrhea; Eye Irritation)      FAMILY HISTORY:  No pertinent family history in first degree relatives      Social History:  Tobacco Use: denies  Alcohol Use: denies  Drug Use: denies  Occupation:  Lives:    Review of Systems:  REVIEW OF SYSTEMS:  CONSTITUTIONAL: No weakness, fevers or chills  EYES/ENT: No visual changes;  No dysphagia  NECK: No pain or stiffness  RESPIRATORY: No cough, wheezing, hemoptysis; No shortness of breath  CARDIOVASCULAR: No chest pain or palpitations; No lower extremity edema  GASTROINTESTINAL: No abdominal or epigastric pain. No nausea, vomiting, or hematemesis; No diarrhea or constipation. No melena or hematochezia.  BACK: No back pain  GENITOURINARY: No dysuria, frequency or hematuria  NEUROLOGICAL: No numbness or weakness  SKIN: No itching, burning, rashes, or lesions   All other review of systems is negative unless indicated above.    Vitals:  T(F): 98.3 (05-09), Max: 98.3 (05-09)  HR: 68 (05-09) (60 - 68)  BP: 142/76 (05-09) (98/36 - 153/65)  RR: 18 (05-09)  SpO2: 95% (05-09)    Physical Exam:  Appearance: no acute distress  HEENT: PERRL  Neck: no JVD  Cardiovascular: RRR, normal S1 S2, no murmurs, rubs, or gallops  Respiratory: clear to auscultation bilaterally  Gastrointestinal: soft, non-tender, non-distended  Neurologic: non-focal  Psychiatry: AAOx3, mood & affect appropriate  Extremities: no LE edema  Vascular: peripheral pulses palpable 2+ bilaterally  Skin: no rashes, ecchymoses, or cyanosis  Lymphatic: no lymphadenopathy    Labs: Personally reviewed                        7.9    12.9  )-----------( 308      ( 09 May 2019 09:39 )             25.1     05-08    132<L>  |  98  |  36<H>  ----------------------------<  222<H>  4.9   |  18<L>  |  1.24    Ca    9.5      08 May 2019 12:12    TPro  7.7  /  Alb  4.4  /  TBili  0.3  /  DBili  x   /  AST  28  /  ALT  19  /  AlkPhos  99  05-08    PT/INR - ( 08 May 2019 12:13 )   PT: 14.4 sec;   INR: 1.24 ratio         PTT - ( 08 May 2019 12:13 )  PTT:32.7 sec                CARDIOVASCULAR DIAGNOSTIC TESTING:  Telemetry:  ECG: Personally Reviewed    [] Echocardiogram:  [] Stress Test:  [] Catheterization:    RADIOLOGY: Reason for Consultation: Preoperative Risk Assessment  Chief Complaint: Weakness  Date of Admission: 5/8/10      HPI:  63 y/o M w/ hx of DM, HTN, GERD, A-fib on apixaban, severe AS, 3rd degree AV block s/p PPM with recent hospitalization for acute blood loss anemia with melena, presenting with weakness and found to be anemic; GI consulted for evaluation.    Mr. Simpson endorses feeling weak and fatigue over the past three weeks, which has not resolved since his recent hospitalization. He admitted from 4/20/19 to 4/24/19 for evaluation of acute blood loss with anemia. He underwent upper endoscopy revealing erosive gastropathy which was thought to represent the patient's source of bleeding. The patient no longer endorses black tarry stools, stating his last episode of "dark stool" was 1 week ago. He otherwise denies bloody stools, bloody emesis, and coffee ground emesis. He denies nausea/vomiting and abdominal pain. The patient takes aspirin and denied other NSAID use, but previously endorsed Ibuprofen use for right arm pain after a fracture.     He has never had a colonoscopy.    Cardiology consulted for pre-operative risk assessment. Patient seen and examined. Vitals: HR 68 and /76. SOB and weakness resolved after transfusion. No chest pain. Exam notable for Severe AS and mild basilar crackles. No edema.       Past Medical History:   Arrhythmia as indication for cardiac pacemaker replacement  HLD (hyperlipidemia)  Depression  Diabetes mellitus type 2 in obese  HTN (hypertension)  Diabetes mellitus type II, controlled, with no complications  Diabetes mellitus type II  Dyslipidemia  HTN (hypertension)    Past Surgical History:   No significant past surgical history    Medications:   dextrose 40% Gel 15 Gram(s) Oral once PRN  dextrose 5%. 1000 milliLiter(s) IV Continuous <Continuous>  dextrose 50% Injectable 12.5 Gram(s) IV Push once  dextrose 50% Injectable 25 Gram(s) IV Push once  dextrose 50% Injectable 25 Gram(s) IV Push once  glucagon  Injectable 1 milliGRAM(s) IntraMuscular once PRN  insulin lispro (HumaLOG) corrective regimen sliding scale   SubCutaneous three times a day before meals  pantoprazole  Injectable 40 milliGRAM(s) IV Push every 12 hours  PARoxetine 40 milliGRAM(s) Oral daily    Allergies:  No Known Drug Allergies  pollen (Rhinitis; Rhinorrhea; Eye Irritation)    FAMILY HISTORY:  No pertinent family history in first degree relatives    Social History:  Tobacco Use: denies  Alcohol Use: denies  Drug Use: denies    Review of Systems:  CONSTITUTIONAL: no fevers or chills, +weakness  EYES/ENT: No visual changes;  No dysphagia  NECK: No pain or stiffness  RESPIRATORY: No cough, wheezing, hemoptysis; +SOB  CARDIOVASCULAR: No chest pain or palpitations; No lower extremity edema  GASTROINTESTINAL: No abdominal or epigastric pain. No nausea, vomiting, or hematemesis; No diarrhea or constipation. No melena or hematochezia.  BACK: No back pain  GENITOURINARY: No dysuria, frequency or hematuria  NEUROLOGICAL: No numbness or weakness  SKIN: No itching, burning, rashes, or lesions   All other review of systems is negative unless indicated above.    Vitals:  T(F): 98.3 (05-09), Max: 98.3 (05-09)  HR: 68 (05-09) (60 - 68)  BP: 142/76 (05-09) (98/36 - 153/65)  RR: 18 (05-09)  SpO2: 95% (05-09)    Physical Exam:  Appearance: no acute distress  HEENT: PERRL  Neck: no JVD  Cardiovascular: RRR, normal S1 S2, no murmurs, rubs, or gallops  Respiratory: clear to auscultation bilaterally  Gastrointestinal: soft, non-tender, non-distended  Neurologic: non-focal  Psychiatry: AAOx3, mood & affect appropriate  Extremities: no LE edema  Vascular: peripheral pulses palpable 2+ bilaterally  Skin: no rashes, ecchymoses, or cyanosis    Labs: Personally reviewed                        7.9    12.9  )-----------( 308      ( 09 May 2019 09:39 )             25.1     05-08    132<L>  |  98  |  36<H>  ----------------------------<  222<H>  4.9   |  18<L>  |  1.24    Ca    9.5      08 May 2019 12:12    TPro  7.7  /  Alb  4.4  /  TBili  0.3  /  DBili  x   /  AST  28  /  ALT  19  /  AlkPhos  99  05-08    PT/INR - ( 08 May 2019 12:13 )   PT: 14.4 sec;   INR: 1.24 ratio   PTT - ( 08 May 2019 12:13 )  PTT:32.7 sec    CARDIOVASCULAR DIAGNOSTIC TESTING:  Telemetry:  ECG: Personally Reviewed    [] Echocardiogram:  < from: Transthoracic Echocardiogram (05.09.19 @ 07:41) >  EF (Teicholtz): 54 %  Doppler Peak Velocity (m/sec): AoV=3.7  ------------------------------------------------------------------------  Observations:  Mitral Valve: Mitral annular calcification, otherwise  normal mitral valve. Minimal mitral regurgitation.  Aortic Valve/Aorta: Calcified trileaflet aortic valve with  decreased opening. Peak transaortic valve gradient equals  55 mm Hg, mean transaortic valve gradient equals 34 mm Hg,  estimated aortic valve area equals 0.8 sqcm (by continuity  equation), aortic valve velocity time integral equals 84  cm, consistent with severe aortic stenosis. Peak left  ventricular outflow tract gradient equals 2 mm Hg, mean  gradient is equal to 1 mm Hg, LVOT velocity time integral  equals 17 cm.  Aortic Root: 3.5 cm.  Left Atrium: Mildly dilated left atrium.  LA volumeindex =  40 cc/m2.  Left Ventricle: Mild segmental left ventricular systolic  dysfunction, basal to mid inferoseptal hypokinesis. Basal  septal hypertrophy.  Right Heart: Normal right atrium. The right ventricle is  not well visualized; grossly normal right ventricular  systolic function.   A device wire is noted in the right  heart. Normal tricuspid valve. Minimal tricuspid  regurgitation. Normal pulmonic valve.  Pericardium/Pleura: Normal pericardium with no pericardial  effusion.  Hemodynamic:Estimated right atrial pressure is 8 mm Hg.  Estimated right ventricular systolic pressure equals 46 mm  Hg, assuming right atrial pressure equals 8 mm Hg,  consistent with mild pulmonary hypertension.    [] Stress Test:  [] Catheterization:  < from: Cardiac Cath Lab (01.30.12 @ 19:01) >  Ventricles: Global left ventricular function was mildly depressed. EF  estimated by contrast ventriculography was 45 %.  Valves: Aortic valve: There was moderate aortic stenosis.  Coronary vessels: The coronary circulation is right dominant.  LM:      LM: Normal.  LAD:      D1: There was a 30 % stenosis.  CX:      Circumflex: Normal.  RCA:      Mid RCA: Angiography showed mild atherosclerosis with no flow  limiting lesions.  Complications: There were no complications.    RADIOLOGY: Reason for Consultation: Preoperative Risk Assessment  Chief Complaint: Weakness  Date of Admission: 5/8/10      HPI:  64M with HTN, DM2, CHB s/p PPM, Persistent Atrial Flutter off apixaban with recent hospitalization for acute anemia with melena now presenting with weakness and SOB. Found to be have acute on chronic anemia with Hb 6.4 (baseline 8). Underwent EGD on prior admission revealing erosive gastropathy which was thought to represent the patient's source of bleeding. No longer endorses tarry stools. No nausea, vomiting, abdominal pain. No fevers, chills, cough. Transfused 1 unit with SOB and weakness resolved.     Cardiology consulted for pre-operative risk assessment. Patient seen and examined. Vitals: HR 68 and /76. SOB and weakness resolved after transfusion. No chest pain. Exam notable for Severe AS and mild basilar crackles. No edema. EKG 5/8 with atrial flutter, v-paced.     Past Medical History:   Arrhythmia as indication for cardiac pacemaker replacement  HLD (hyperlipidemia)  Depression  Diabetes mellitus type 2 in obese  HTN (hypertension)  Diabetes mellitus type II, controlled, with no complications  Diabetes mellitus type II  Dyslipidemia  HTN (hypertension)    Past Surgical History:   No significant past surgical history    Medications:   dextrose 40% Gel 15 Gram(s) Oral once PRN  dextrose 5%. 1000 milliLiter(s) IV Continuous <Continuous>  dextrose 50% Injectable 12.5 Gram(s) IV Push once  dextrose 50% Injectable 25 Gram(s) IV Push once  dextrose 50% Injectable 25 Gram(s) IV Push once  glucagon  Injectable 1 milliGRAM(s) IntraMuscular once PRN  insulin lispro (HumaLOG) corrective regimen sliding scale   SubCutaneous three times a day before meals  pantoprazole  Injectable 40 milliGRAM(s) IV Push every 12 hours  PARoxetine 40 milliGRAM(s) Oral daily    Allergies:  No Known Drug Allergies  pollen (Rhinitis; Rhinorrhea; Eye Irritation)    FAMILY HISTORY:  No pertinent family history in first degree relatives    Social History:  Tobacco Use: denies  Alcohol Use: denies  Drug Use: denies    Review of Systems:  CONSTITUTIONAL: no fevers or chills, +weakness  EYES/ENT: No visual changes;  No dysphagia  NECK: No pain or stiffness  RESPIRATORY: No cough, wheezing, hemoptysis; +SOB  CARDIOVASCULAR: No chest pain or palpitations; No lower extremity edema  GASTROINTESTINAL: No abdominal or epigastric pain. No nausea, vomiting, or hematemesis; No diarrhea or constipation. No melena or hematochezia.  BACK: No back pain  GENITOURINARY: No dysuria, frequency or hematuria  NEUROLOGICAL: No numbness or weakness  SKIN: No itching, burning, rashes, or lesions   All other review of systems is negative unless indicated above.    Vitals:  T(F): 98.3 (05-09), Max: 98.3 (05-09)  HR: 68 (05-09) (60 - 68)  BP: 142/76 (05-09) (98/36 - 153/65)  RR: 18 (05-09)  SpO2: 95% (05-09)    Physical Exam:  Appearance: no acute distress  HEENT: PERRL  Neck: no JVD  Cardiovascular: RRR, normal S1 S2, 3/6 late peaking systolic murmur  Respiratory: clear to auscultation bilaterally  Gastrointestinal: soft, non-tender, non-distended  Neurologic: non-focal  Psychiatry: AAOx3, mood & affect appropriate  Extremities: no LE edema  Vascular: peripheral pulses palpable 2+ bilaterally  Skin: no rashes, ecchymoses, or cyanosis    Labs: Personally reviewed                        7.9    12.9  )-----------( 308      ( 09 May 2019 09:39 )             25.1     05-08    132<L>  |  98  |  36<H>  ----------------------------<  222<H>  4.9   |  18<L>  |  1.24    Ca    9.5      08 May 2019 12:12    TPro  7.7  /  Alb  4.4  /  TBili  0.3  /  DBili  x   /  AST  28  /  ALT  19  /  AlkPhos  99  05-08    PT/INR - ( 08 May 2019 12:13 )   PT: 14.4 sec;   INR: 1.24 ratio   PTT - ( 08 May 2019 12:13 )  PTT:32.7 sec    CARDIOVASCULAR DIAGNOSTIC TESTING:  Telemetry:  ECG: Personally Reviewed    [] Echocardiogram:  < from: Transthoracic Echocardiogram (05.09.19 @ 07:41) >  EF (Teicholtz): 54 %  Doppler Peak Velocity (m/sec): AoV=3.7  ------------------------------------------------------------------------  Observations:  Mitral Valve: Mitral annular calcification, otherwise  normal mitral valve. Minimal mitral regurgitation.  Aortic Valve/Aorta: Calcified trileaflet aortic valve with  decreased opening. Peak transaortic valve gradient equals  55 mm Hg, mean transaortic valve gradient equals 34 mm Hg,  estimated aortic valve area equals 0.8 sqcm (by continuity  equation), aortic valve velocity time integral equals 84  cm, consistent with severe aortic stenosis. Peak left  ventricular outflow tract gradient equals 2 mm Hg, mean  gradient is equal to 1 mm Hg, LVOT velocity time integral  equals 17 cm.  Aortic Root: 3.5 cm.  Left Atrium: Mildly dilated left atrium.  LA volumeindex =  40 cc/m2.  Left Ventricle: Mild segmental left ventricular systolic  dysfunction, basal to mid inferoseptal hypokinesis. Basal  septal hypertrophy.  Right Heart: Normal right atrium. The right ventricle is  not well visualized; grossly normal right ventricular  systolic function.   A device wire is noted in the right  heart. Normal tricuspid valve. Minimal tricuspid  regurgitation. Normal pulmonic valve.  Pericardium/Pleura: Normal pericardium with no pericardial  effusion.  Hemodynamic:Estimated right atrial pressure is 8 mm Hg.  Estimated right ventricular systolic pressure equals 46 mm  Hg, assuming right atrial pressure equals 8 mm Hg,  consistent with mild pulmonary hypertension.    [] Stress Test:  [] Catheterization:  < from: Cardiac Cath Lab (01.30.12 @ 19:01) >  Ventricles: Global left ventricular function was mildly depressed. EF  estimated by contrast ventriculography was 45 %.  Valves: Aortic valve: There was moderate aortic stenosis.  Coronary vessels: The coronary circulation is right dominant.  LM:      LM: Normal.  LAD:      D1: There was a 30 % stenosis.  CX:      Circumflex: Normal.  RCA:      Mid RCA: Angiography showed mild atherosclerosis with no flow  limiting lesions.  Complications: There were no complications.    RADIOLOGY:

## 2019-05-09 NOTE — CONSULT NOTE ADULT - ATTENDING COMMENTS
- agree with plan as above, would order echo already
64 year old man presents with acute severe GI bleed requiring transfusions. Has permanent pacemaker for complete heart block and persistent non-conducted atrial flutter. On exam normotensive, lungs now clear, heart rhythm regular (paced) gr iii/vi harsh mid to late peaking systolic murmur base radiating to carotids and late peaking systolic murmur audible apex as well. A2 soft with dominant P2. No peripheral edema.    Thus known gradually progressive aortic stenosis, preserved LV systolic function, no heart failure, no ischemic heart disease and rhythm stable with pacemaker. There is no contraindication to necessary GI endoscopic procedure and sedation/analgesia.

## 2019-05-09 NOTE — PROGRESS NOTE ADULT - PROBLEM SELECTOR PLAN 1
due to acute GI bleed   s/p 1 units of PRBC, H/h Stable monitor CBC q8h   -Lasix 20mg IV x1 d basilar crackles 2/2 recent transfusion  keep clears for now, npo at midnight for colonoscopy per GI   transfuse if Hb ,7 would do 1/2 units at a time due to sever AS  hold off on IVF  hold asa, and ac

## 2019-05-09 NOTE — PROVIDER CONTACT NOTE (CRITICAL VALUE NOTIFICATION) - ASSESSMENT
Pt A&OX4, VSS, Denies cp, sob, and distress
Pt A&OX3, VSS, Pt on bedrest, Denies cp, sob, and distress

## 2019-05-09 NOTE — PROVIDER CONTACT NOTE (CRITICAL VALUE NOTIFICATION) - ACTION/TREATMENT ORDERED:
NP notified, NP at bedside, Stat Type and Screen & CBC x1 ordered, Pt ordered Half unit of PRBCs x2 pending results, Will continue to monitor
NP notified, Half unit x2 ordered, Pt pending type and Screen results, Will transfuse patient once type and screen results are available, will continue to monitor

## 2019-05-09 NOTE — CONSULT NOTE ADULT - ASSESSMENT
64M with HTN, DM, CHB s/p PPM (SJM, implanted 02/28/2013, last interrogation 03/05/2019 without events, patient with persistent AFL since 09/21/2018), Persistent Atrial Flutter (off apixaban) presents with SOB and weakness in setting of acute on chronic anemia. GI planning Colonoscopy tomorrow     Impression  1. Severe AS  2. CAD  3. HTN  4. CHB s/p PPM  5. Persistent Atrial Flutter    Preoperative Risk Assessment  -no evidence of ACS or decompensated HF,   -would avoid general anesthesia given severe AS  -persistent Atrial Flutter    Recommendation:  -no cardiac contraindication to proceeding with Colonoscopy  -Lasix 20mg IV x1 give mild basilar crackles in setting of recent transfusion 64M with HTN, DM, CHB s/p PPM (SJM, implanted 02/28/2013, last interrogation 03/05/2019 without events, patient with persistent AFL since 09/21/2018), Persistent Atrial Flutter (off apixaban) presents with SOB and weakness in setting of acute on chronic anemia. GI planning Colonoscopy tomorrow     Impression:  1. Severe AS: FOZIA 0.8cm, no chest pain or decompensated HF  2. CAD: [facility] 2012 non-obstructive  3. HTN: controlled  4. CHB s/p PPM  5. Persistent Atrial Flutter: CHADSVASC 3, off Apixaban 2/2 GIB    Preoperative Risk Assessment  -RCRI = 0 (3.9% risk of MACE)  -METS>4, no evidence of ACS or decompensated HF  -known Severe AS, would avoid general anesthesia  -active arrythmia: underlying atrial flutter (persistent since 9/2018), PPM dependent 2/2 CHB    Recommendation:  -no cardiac contraindication to proceeding with Colonoscopy  -Lasix 20mg IV x1 give mild basilar crackles in setting of recent transfusion  -continue to hold ASA and Apixaban in setting of GIB  -needs evaluation for TAVR with structural heart once anemia and GIB addressed    Carter Hernandez M.D.  Cardiology Fellow  [phone]  For all Cardiology service contact information, go to [email or web address] and use "[email or web address]" to login. 64M with HTN, DM2, CHB s/p PPM (SJM, implanted 02/28/2013, last interrogation 03/05/2019 without events, patient with persistent AFL since 09/21/2018), Persistent Atrial Flutter (off apixaban) presents with SOB and weakness in setting of acute on chronic anemia. GI planning Colonoscopy tomorrow.    Impression:  1. Severe AS: FOZIA 0.8cm, no chest pain or decompensated HF  2. CAD: [facility] 2012 non-obstructive  3. HTN: controlled  4. CHB s/p PPM  5. Persistent Atrial Flutter: CHADSVASC 3, off Apixaban 2/2 GIB    Preoperative Risk Assessment  -RCRI = 1 (6.0% risk of MACE)  -METS>4, no evidence of ACS or decompensated HF  -known Severe AS, would avoid general anesthesia  -active arrythmia: underlying atrial flutter (persistent since 9/2018), PPM dependent 2/2 CHB    Recommendation:  -no cardiac contraindication to proceeding with Colonoscopy  -Lasix 20mg IV x1 give mild basilar crackles in setting of recent transfusion  -continue to hold ASA and Apixaban in setting of GIB  -needs evaluation for TAVR with structural heart once anemia and GIB addressed    Carter Hernandez M.D.  Cardiology Fellow  [phone]  For all Cardiology service contact information, go to [email or web address] and use "[email or web address]" to login. 64M with HTN, DM2, CHB s/p PPM (SJM, implanted 02/28/2013, last interrogation 03/05/2019 without events, patient with persistent AFL since 09/21/2018), Persistent Atrial Flutter (off apixaban) presents with SOB and weakness in setting of acute on chronic anemia. GI planning Colonoscopy tomorrow.    Impression:  1. Severe AS: FOZIA 0.8cm, no chest pain or decompensated HF  2. CAD: [facility] 2012 non-obstructive  3. HTN: controlled  4. CHB s/p PPM  5. Persistent Atrial Flutter: CHADSVASC 3, off Apixaban 2/2 GIB    Preoperative Risk Assessment  -RCRI = 2 (10.1% risk of MACE)  -METS>4, no evidence of ACS or decompensated HF  -known Severe AS, would avoid general anesthesia  -active arrythmia: underlying atrial flutter (persistent since 9/2018), PPM dependent 2/2 CHB    Recommendation:  -no cardiac contraindication to proceeding with colonoscopy given anemia and active GIB  -Lasix 20mg IV x1 (lasix naive) given mild basilar crackles in setting of recent transfusion  -continue to hold ASA and Apixaban in setting of GIB  -needs evaluation for TAVR with structural heart once anemia and GIB addressed    Carter Hernandez M.D.  Cardiology Fellow  [phone]  For all Cardiology service contact information, go to [email or web address] and use "[email or web address]" to login. 64M with HTN, DM2, CHB s/p PPM (SJM, implanted 02/28/2013, last interrogation 03/05/2019 without events, patient with persistent AFL since 09/21/2018), Persistent Atrial Flutter (off apixaban) presents with SOB and weakness in setting of acute on chronic anemia. GI planning Colonoscopy tomorrow.    Impression:  1. Severe AS: FOZIA 0.8cm (intermediate gradient), no chest pain or decompensated HF  2. CAD: [facility] 2012 non-obstructive  3. HTN: controlled  4. CHB s/p PPM  5. Persistent Atrial Flutter: CHADSVASC 3, off Apixaban 2/2 GIB    Preoperative Risk Assessment  -RCRI = 2 (10.1% risk of MACE)  -METS>4, no evidence of ACS or decompensated HF  -known Severe AS, intermediate gradient  -active arrythmia: underlying atrial flutter (persistent since 9/2018), PPM dependent 2/2 CHB    Recommendation:  -no cardiac contraindication to proceeding with colonoscopy for anemia and active GIB  -Lasix 20mg IV x1 (lasix naive) given mild basilar crackles in setting of recent transfusion  -continue to hold ASA and Apixaban in setting of severe blood loss  -consider evaluation for TAVR with structural heart once anemia and GIB addressed    Carter Hernandez M.D.  Cardiology Fellow  [phone]  For all Cardiology service contact information, go to [email or web address] and use "cardfellows" to login.

## 2019-05-09 NOTE — CONSULT NOTE ADULT - SUBJECTIVE AND OBJECTIVE BOX
Chief Complaint: Weakness    HPI:    63 y/o M w/ hx of DM, HTN, GERD, A-fib on apixaban, severe AS, 3rd degree AV block s/p PPM with recent hospitalization for acute blood loss anemia with melena, presenting with weakness and found to be anemic; GI consulted for evaluation.    Mr. Simpson endorses feeling weak and fatigue over the past three weeks, which has not resolved since his recent hospitalization. He admitted from 4/20/19 to 4/24/19 for evaluation of acute blood loss with anemia. He underwent upper endoscopy revealing erosive gastropathy which was thought to represent the patient's source of bleeding. The patient no longer endorses black tarry stools, stating his last episode of "dark stool" was 1 week ago. He otherwise denies bloody stools, bloody emesis, and coffee ground emesis. He denies nausea/vomiting and abdominal pain. He denies NSAID use.    He has never had a colonoscopy.    Allergies:  No Known Drug Allergies  pollen (Rhinitis; Rhinorrhea; Eye Irritation)    Home Medications:    · 	pantoprazole 40 mg oral delayed release tablet: 1 tab(s) orally once a day (before a meal), Last Dose Taken:    · 	docusate sodium 100 mg oral capsule: 1 cap(s) orally 3 times a day, Last Dose Taken:    · 	senna oral tablet: 2 tab(s) orally once a day (at bedtime), Last Dose Taken:    · 	gemfibrozil 600 mg oral tablet: 1 tab(s) orally 2 times a day, Last Dose Taken:    · 	glimepiride 4 mg oral tablet: 1 tab(s) orally once a day, Last Dose Taken:    · 	lisinopril 20 mg oral tablet: 1 tab(s) orally once a day, Last Dose Taken:    · 	PARoxetine 40 mg oral tablet: 1 tab(s) orally once a day, Last Dose Taken:    · 	Toujeo SoloStar 300 units/mL subcutaneous solution: 30 unit(s) subcutaneous once a day (at bedtime), Last Dose Taken:    · 	multivitamin: 1 tab(s) orally once a day, Last Dose Taken:    · 	aspirin 81 mg oral delayed release tablet: 1 tab(s) orally once a day, Last Dose Taken:    · 	Jardiance 25 mg oral tablet: 1 tab(s) orally once a day, Last Dose Taken:    · 	metFORMIN 500 mg oral tablet: 1 tab(s) orally 2 times a day, Last Dose Taken:      Hospital Medications:  dextrose 40% Gel 15 Gram(s) Oral once PRN  dextrose 5%. 1000 milliLiter(s) IV Continuous <Continuous>  dextrose 50% Injectable 12.5 Gram(s) IV Push once  dextrose 50% Injectable 25 Gram(s) IV Push once  dextrose 50% Injectable 25 Gram(s) IV Push once  glucagon  Injectable 1 milliGRAM(s) IntraMuscular once PRN  insulin lispro (HumaLOG) corrective regimen sliding scale   SubCutaneous three times a day before meals  pantoprazole  Injectable 40 milliGRAM(s) IV Push every 12 hours  PARoxetine 40 milliGRAM(s) Oral daily    PMHX/PSHX:  Arrhythmia as indication for cardiac pacemaker replacement  HLD (hyperlipidemia)  Depression  Diabetes mellitus type 2 in obese  HTN (hypertension)  Diabetes mellitus type II, controlled, with no complications  Diabetes mellitus type II  Dyslipidemia  HTN (hypertension)    Family history:      Uncle with colon cancer    Denies family history of stomach cancer/polyps, pancreatic cancer/masses, liver cancer/disease, ovarian cancer and endometrial cancer.    Social History:     Tob: Denies  EtOH: Denies  Illicit Drugs: Denies    ROS:     General:  No wt loss, fevers, chills, night sweats, + fatigue  Eyes:  Good vision, no reported pain  ENT:  No sore throat, pain, runny nose, dysphagia  CV:  No pain, palpitations, hypo/hypertension  Pulm:  No dyspnea, cough, tachypnea, wheezing  GI:  No pain, No nausea, No vomiting, No diarrhea, No constipation, No weight loss, No fever, No pruritis, No rectal bleeding, No tarry stools, No dysphagia,  :  No pain, bleeding, incontinence, nocturia  Skin:  No rash, tattoos, scars, edema    PHYSICAL EXAM:     GENERAL:  No acute distress  HEENT:  Normocephalic/atraumatic, no scleral icterus  CHEST:  Normal effort, no accessory muscle use  HEART: Irregularly irregular rhythm, systolic murmur  ABDOMEN:  Soft, non-tender, non-distended, normoactive bowel sounds  EXTREMITIES: No cyanosis, clubbing, or edema  RECTAL: Nancy-anal skin tag, no external hemorrhoids, no masses, rectal vault with minimal amount of brown stool  SKIN:  No rash/erythema  NEURO:  Alert and oriented x 3    Vital Signs:  Vital Signs Last 24 Hrs  T(C): 36.4 (09 May 2019 07:34), Max: 36.8 (08 May 2019 11:27)  T(F): 97.5 (09 May 2019 07:34), Max: 98.2 (08 May 2019 11:27)  HR: 60 (09 May 2019 07:34) (60 - 113)  BP: 142/84 (09 May 2019 07:34) (98/36 - 153/65)  BP(mean): --  RR: 19 (09 May 2019 07:34) (18 - 20)  SpO2: 97% (09 May 2019 07:34) (94% - 100%)  Daily Height in cm: 182.88 (08 May 2019 10:54)      LABS:                        6.5    14.4  )-----------( 287      ( 08 May 2019 23:29 )             20.6     Mean Cell Volume: 76.4 fl (05-08-19 @ 23:29)    05-08    132<L>  |  98  |  36<H>  ----------------------------<  222<H>  4.9   |  18<L>  |  1.24    Ca    9.5      08 May 2019 12:12    TPro  7.7  /  Alb  4.4  /  TBili  0.3  /  DBili  x   /  AST  28  /  ALT  19  /  AlkPhos  99  05-08    LIVER FUNCTIONS - ( 08 May 2019 12:12 )  Alb: 4.4 g/dL / Pro: 7.7 g/dL / ALK PHOS: 99 U/L / ALT: 19 U/L / AST: 28 U/L / GGT: x           PT/INR - ( 08 May 2019 12:13 )   PT: 14.4 sec;   INR: 1.24 ratio         PTT - ( 08 May 2019 12:13 )  PTT:32.7 sec               6.5    14.4  )-----------( 287      ( 08 May 2019 23:29 )             20.6                         6.4    12.20 )-----------( 363      ( 08 May 2019 22:11 )             21.9                         7.5    15.5  )-----------( 377      ( 08 May 2019 12:12 )             23.5     Imaging:    < from: Xray Chest 1 View- PORTABLE-Urgent (05.08.19 @ 12:23) >    EXAM:  XR CHEST PORTABLE URGENT 1V                            PROCEDURE DATE:  05/08/2019      INTERPRETATION:  AP chest x-ray at 1222 hours on 5/8/2019.    Clinical information: Shortness of breath.    Comparison: Chest x-ray dated 8/22/2017.    Findings: Bipolar transvenous pacemaker is present.    Heart size is normal. Pulmonary vascularity appears normal. No   consolidation or pleural effusion is noted.    Impression: Clear lungs.    ANGLE HERNANDEZ M.D., ATTENDING RADIOLOGIST  This document has been electronically signed. May  8 2019  1:53PM      < end of copied text >    Procedures:    < from: Upper Endoscopy (04.22.19 @ 09:22) >    [facility]  ____________________________________________________________________________________________________  Patient Name: Cameron Simpson                       MRN: [id number]  Account Number: [id number]                     YOB: 1955  Room: endo 5                                     Gender: Male  Attending MD: Jens Ortiz MD          Procedure Date No Time: 4/22/2019  ____________________________________________________________________________________________________     Procedure:           Upper GI endoscopy  Indications:         Melena  Providers:           Jens Ortiz MD, Dash Lorenzana (Fellow)  Medicines:           Monitored Anesthesia Care  Complications:       No immediate complications. Estimated blood loss: None.  ____________________________________________________________________________________________________  Procedure:           Pre-Anesthesia Assessment:                       - Prior to the procedure, a History andPhysical was performed, and patient                        medications and allergies were reviewed. The risks and benefits of the                        procedure and the sedation options and risks were discussed with the patient.      All questions were answered and informed consent was obtained. Patient                        identification and proposed procedure were verified by the physician, the                        nurse and the anesthesiologist in the pre-procedure areain the procedure                        room in the endoscopy suite. Prophylactic Antibiotics: The patient does not                        require prophylactic antibiotics. Prior Anticoagulants: The patient has taken                        no previous anticoagulant or antiplatelet agents. ASA Grade Assessment: III -                        A patient with severe systemic disease. After reviewing the risks and                        benefits, the patient was deemed in satisfactory condition to undergo the                        procedure. The anesthesia plan was to use monitored anesthesia care (MAC).                        Immediately prior to administration of medications, the patient was                        re-assessed for adequacy to receive sedatives. The heart rate, respiratory                        rate, oxygen saturations, blood pressure, adequacy of pulmonary ventilation,                        and response to care were monitored throughout the procedure. The physical               status of the patient was re-assessed after the procedure.                       After obtaining informed consent, the endoscope was passed under direct                        vision. Throughout the procedure, the patient's blood pressure, pulse, and                        oxygen saturations were monitored continuously. The Endoscope was introduced                        through the mouth, and advanced to the second part of duodenum. The upper GI                        endoscopy was accomplished without difficulty. The patient tolerated the                        procedure well.                                                                                                        Findings:       The examined esophagus was normal.       The Z-line was regular and was found 37 cm from the incisors.       Scattered moderate inflammation characterized by erosions was found at the incisura, in the        gastric antrum and in the prepyloric region of the stomach. No active bleedingwas seen.        Biopsies were taken with a cold forceps for histology from the antrum, incisura and body for        H pylori testing.       A single medium papule (nodule) about 1 cm in size with no stigmata of recent bleeding was        found in the prepyloric region of the stomach.       The exam of the stomach was otherwise normal.       The duodenal bulb and 2nd part of the duodenum were normal.       Impression:          - Normal esophagus.                       - Z-line regular, 37 cm from the incisors.                       - Erosive gastritis. Biopsied. Most likely the cause of melena.                       - Gastric nodule.          - Normal duodenal bulb and 2nd part of the duodenum.  Recommendation:      - [facility].                       - Use Protonix (pantoprazole) 40 mg PO daily.                       - Await pathology results.                       - Advance diet as tolerated.                       - Monitor blood counts.                       - Outpatient GI follow up for EUS of the gastric nodule.                       - Colonoscopy as outpatient for colon cancer screening, unless melena                        continues then this would have to be done inpatient.                                                                                                        Attending Participation:       I was present and participated during the entire procedure, including non-key portions.                                                                                                          ________________________  Jens Ortiz MD  4/22/2019 11:57:44 AM  Number of Addenda: 0    Note Initiated On: 4/22/2019 9:22 AM    < end of copied text > Chief Complaint: Weakness    HPI:    65 y/o M w/ hx of DM, HTN, GERD, A-fib on apixaban, severe AS, 3rd degree AV block s/p PPM with recent hospitalization for acute blood loss anemia with melena, presenting with weakness and found to be anemic; GI consulted for evaluation.    Mr. Simpson endorses feeling weak and fatigue over the past three weeks, which has not resolved since his recent hospitalization. He admitted from 4/20/19 to 4/24/19 for evaluation of acute blood loss with anemia. He underwent upper endoscopy revealing erosive gastropathy which was thought to represent the patient's source of bleeding. The patient no longer endorses black tarry stools, stating his last episode of "dark stool" was 1 week ago. He otherwise denies bloody stools, bloody emesis, and coffee ground emesis. He denies nausea/vomiting and abdominal pain. The patient takes aspirin and denied other NSAID use, but previously endorsed Ibuprofen use for right arm pain after a fracture.     He has never had a colonoscopy.    Allergies:  No Known Drug Allergies  pollen (Rhinitis; Rhinorrhea; Eye Irritation)    Home Medications:    · 	pantoprazole 40 mg oral delayed release tablet: 1 tab(s) orally once a day (before a meal), Last Dose Taken:    · 	docusate sodium 100 mg oral capsule: 1 cap(s) orally 3 times a day, Last Dose Taken:    · 	senna oral tablet: 2 tab(s) orally once a day (at bedtime), Last Dose Taken:    · 	gemfibrozil 600 mg oral tablet: 1 tab(s) orally 2 times a day, Last Dose Taken:    · 	glimepiride 4 mg oral tablet: 1 tab(s) orally once a day, Last Dose Taken:    · 	lisinopril 20 mg oral tablet: 1 tab(s) orally once a day, Last Dose Taken:    · 	PARoxetine 40 mg oral tablet: 1 tab(s) orally once a day, Last Dose Taken:    · 	Toujeo SoloStar 300 units/mL subcutaneous solution: 30 unit(s) subcutaneous once a day (at bedtime), Last Dose Taken:    · 	multivitamin: 1 tab(s) orally once a day, Last Dose Taken:    · 	aspirin 81 mg oral delayed release tablet: 1 tab(s) orally once a day, Last Dose Taken:    · 	Jardiance 25 mg oral tablet: 1 tab(s) orally once a day, Last Dose Taken:    · 	metFORMIN 500 mg oral tablet: 1 tab(s) orally 2 times a day, Last Dose Taken:      Hospital Medications:  dextrose 40% Gel 15 Gram(s) Oral once PRN  dextrose 5%. 1000 milliLiter(s) IV Continuous <Continuous>  dextrose 50% Injectable 12.5 Gram(s) IV Push once  dextrose 50% Injectable 25 Gram(s) IV Push once  dextrose 50% Injectable 25 Gram(s) IV Push once  glucagon  Injectable 1 milliGRAM(s) IntraMuscular once PRN  insulin lispro (HumaLOG) corrective regimen sliding scale   SubCutaneous three times a day before meals  pantoprazole  Injectable 40 milliGRAM(s) IV Push every 12 hours  PARoxetine 40 milliGRAM(s) Oral daily    PMHX/PSHX:  Arrhythmia as indication for cardiac pacemaker replacement  HLD (hyperlipidemia)  Depression  Diabetes mellitus type 2 in obese  HTN (hypertension)  Diabetes mellitus type II, controlled, with no complications  Diabetes mellitus type II  Dyslipidemia  HTN (hypertension)    Family history:      Uncle with colon cancer    Denies family history of stomach cancer/polyps, pancreatic cancer/masses, liver cancer/disease, ovarian cancer and endometrial cancer.    Social History:     Tob: Denies  EtOH: Denies  Illicit Drugs: Denies    ROS:     General:  No wt loss, fevers, chills, night sweats, + fatigue  Eyes:  Good vision, no reported pain  ENT:  No sore throat, pain, runny nose, dysphagia  CV:  No pain, palpitations, hypo/hypertension  Pulm:  No dyspnea, cough, tachypnea, wheezing  GI:  No pain, No nausea, No vomiting, No diarrhea, No constipation, No weight loss, No fever, No pruritis, No rectal bleeding, No tarry stools, No dysphagia,  :  No pain, bleeding, incontinence, nocturia  Skin:  No rash, tattoos, scars, edema    PHYSICAL EXAM:     GENERAL:  No acute distress  HEENT:  Normocephalic/atraumatic, no scleral icterus  CHEST:  Normal effort, no accessory muscle use  HEART: Irregularly irregular rhythm, systolic murmur  ABDOMEN:  Soft, non-tender, non-distended, normoactive bowel sounds  EXTREMITIES: No cyanosis, clubbing, or edema  RECTAL: Nancy-anal skin tag, no external hemorrhoids, no masses, rectal vault with minimal amount of brown stool  SKIN:  No rash/erythema  NEURO:  Alert and oriented x 3    Vital Signs:  Vital Signs Last 24 Hrs  T(C): 36.4 (09 May 2019 07:34), Max: 36.8 (08 May 2019 11:27)  T(F): 97.5 (09 May 2019 07:34), Max: 98.2 (08 May 2019 11:27)  HR: 60 (09 May 2019 07:34) (60 - 113)  BP: 142/84 (09 May 2019 07:34) (98/36 - 153/65)  BP(mean): --  RR: 19 (09 May 2019 07:34) (18 - 20)  SpO2: 97% (09 May 2019 07:34) (94% - 100%)  Daily Height in cm: 182.88 (08 May 2019 10:54)      LABS:                        6.5    14.4  )-----------( 287      ( 08 May 2019 23:29 )             20.6     Mean Cell Volume: 76.4 fl (05-08-19 @ 23:29)    05-08    132<L>  |  98  |  36<H>  ----------------------------<  222<H>  4.9   |  18<L>  |  1.24    Ca    9.5      08 May 2019 12:12    TPro  7.7  /  Alb  4.4  /  TBili  0.3  /  DBili  x   /  AST  28  /  ALT  19  /  AlkPhos  99  05-08    LIVER FUNCTIONS - ( 08 May 2019 12:12 )  Alb: 4.4 g/dL / Pro: 7.7 g/dL / ALK PHOS: 99 U/L / ALT: 19 U/L / AST: 28 U/L / GGT: x           PT/INR - ( 08 May 2019 12:13 )   PT: 14.4 sec;   INR: 1.24 ratio         PTT - ( 08 May 2019 12:13 )  PTT:32.7 sec               6.5    14.4  )-----------( 287      ( 08 May 2019 23:29 )             20.6                         6.4    12.20 )-----------( 363      ( 08 May 2019 22:11 )             21.9                         7.5    15.5  )-----------( 377      ( 08 May 2019 12:12 )             23.5     Imaging:    < from: Xray Chest 1 View- PORTABLE-Urgent (05.08.19 @ 12:23) >    EXAM:  XR CHEST PORTABLE URGENT 1V                            PROCEDURE DATE:  05/08/2019      INTERPRETATION:  AP chest x-ray at 1222 hours on 5/8/2019.    Clinical information: Shortness of breath.    Comparison: Chest x-ray dated 8/22/2017.    Findings: Bipolar transvenous pacemaker is present.    Heart size is normal. Pulmonary vascularity appears normal. No   consolidation or pleural effusion is noted.    Impression: Clear lungs.    ANGLE HERNANDEZ M.D., ATTENDING RADIOLOGIST  This document has been electronically signed. May  8 2019  1:53PM      < end of copied text >    Procedures:    < from: Upper Endoscopy (04.22.19 @ 09:22) >    [facility]  ____________________________________________________________________________________________________  Patient Name: Cameron Simpson                       MRN: [id number]  Account Number: [id number]                     YOB: 1955  Room: [facility] 5                                     Gender: Male  Attending MD: Jens Ortiz MD          Procedure Date No Time: 4/22/2019  ____________________________________________________________________________________________________     Procedure:           Upper GI endoscopy  Indications:         Melena  Providers:           Jens Ortiz MD, Dash Lorenzana (Fellow)  Medicines:           Monitored Anesthesia Care  Complications:       No immediate complications. Estimated blood loss: None.  ____________________________________________________________________________________________________  Procedure:           Pre-Anesthesia Assessment:                       - Prior to the procedure, a History andPhysical was performed, and patient                        medications and allergies were reviewed. The risks and benefits of the                        procedure and the sedation options and risks were discussed with the patient.      All questions were answered and informed consent was obtained. Patient                        identification and proposed procedure were verified by the physician, the                        nurse and the anesthesiologist in the pre-procedure areain the procedure                        room in the endoscopy suite. Prophylactic Antibiotics: The patient does not                        require prophylactic antibiotics. Prior Anticoagulants: The patient has taken                        no previous anticoagulant or antiplatelet agents. ASA Grade Assessment: III -                        A patient with severe systemic disease. After reviewing the risks and                        benefits, the patient was deemed in satisfactory condition to undergo the                        procedure. The anesthesia plan was to use monitored anesthesia care (MAC).                        Immediately prior to administration of medications, the patient was                        re-assessed for adequacy to receive sedatives. The heart rate, respiratory                        rate, oxygen saturations, blood pressure, adequacy of pulmonary ventilation,                        and response to care were monitored throughout the procedure. The physical               status of the patient was re-assessed after the procedure.                       After obtaining informed consent, the endoscope was passed under direct                        vision. Throughout the procedure, the patient's blood pressure, pulse, and                        oxygen saturations were monitored continuously. The Endoscope was introduced                        through the mouth, and advanced to the second part of duodenum. The upper GI                        endoscopy was accomplished without difficulty. The patient tolerated the                        procedure well.                                                                                                        Findings:       The examined esophagus was normal.       The Z-line was regular and was found 37 cm from the incisors.       Scattered moderate inflammation characterized by erosions was found at the incisura, in the        gastric antrum and in the prepyloric region of the stomach. No active bleedingwas seen.        Biopsies were taken with a cold forceps for histology from the antrum, incisura and body for        H pylori testing.       A single medium papule (nodule) about 1 cm in size with no stigmata of recent bleeding was        found in the prepyloric region of the stomach.       The exam of the stomach was otherwise normal.       The duodenal bulb and 2nd part of the duodenum were normal.       Impression:          - Normal esophagus.                       - Z-line regular, 37 cm from the incisors.                       - Erosive gastritis. Biopsied. Most likely the cause of melena.                       - Gastric nodule.          - Normal duodenal bulb and 2nd part of the duodenum.  Recommendation:      - Return patient [facility] for [facility].                       - Use Protonix (pantoprazole) 40 mg PO daily.                       - Await pathology results.                       - Advance diet as tolerated.                       - Monitor blood counts.                       - Outpatient GI follow up for EUS of the gastric nodule.                       - Colonoscopy as outpatient for colon cancer screening, unless melena                        continues then this would have to be done inpatient.                                                                                                        Attending Participation:       I was present and participated during the entire procedure, including non-key portions.                                                                                                          ________________________  Jens Ortiz MD  4/22/2019 11:57:44 AM  Number of Addenda: 0    Note Initiated On: 4/22/2019 9:22 AM    < end of copied text >

## 2019-05-09 NOTE — PROGRESS NOTE ADULT - SUBJECTIVE AND OBJECTIVE BOX
Patient is a 64y old  Male who presents with a chief complaint of I feel weak (09 May 2019 13:20)      SUBJECTIVE / OVERNIGHT EVENTS: Patient seen and examined at bedside. His last BM was yesterday night, denies any Abd pain and n/v. states he is tolerating clear liquid diet well. Denies any CP, dizziness and SOB    ROS:  All other review of systems negative    Allergies    No Known Drug Allergies  pollen (Rhinitis; Rhinorrhea; Eye Irritation)    Intolerances        MEDICATIONS  (STANDING):  dextrose 5%. 1000 milliLiter(s) (50 mL/Hr) IV Continuous <Continuous>  dextrose 50% Injectable 12.5 Gram(s) IV Push once  dextrose 50% Injectable 25 Gram(s) IV Push once  dextrose 50% Injectable 25 Gram(s) IV Push once  insulin lispro (HumaLOG) corrective regimen sliding scale   SubCutaneous three times a day before meals  pantoprazole  Injectable 40 milliGRAM(s) IV Push every 12 hours  PARoxetine 40 milliGRAM(s) Oral daily    MEDICATIONS  (PRN):  dextrose 40% Gel 15 Gram(s) Oral once PRN Blood Glucose LESS THAN 70 milliGRAM(s)/deciliter  glucagon  Injectable 1 milliGRAM(s) IntraMuscular once PRN Glucose LESS THAN 70 milligrams/deciliter      Vital Signs Last 24 Hrs  T(C): 36.8 (09 May 2019 14:13), Max: 36.8 (08 May 2019 18:58)  T(F): 98.3 (09 May 2019 14:13), Max: 98.3 (09 May 2019 09:25)  HR: 60 (09 May 2019 14:13) (60 - 68)  BP: 149/74 (09 May 2019 14:13) (98/36 - 153/65)  BP(mean): --  RR: 18 (09 May 2019 14:13) (18 - 19)  SpO2: 99% (09 May 2019 14:13) (94% - 100%)  CAPILLARY BLOOD GLUCOSE      POCT Blood Glucose.: 166 mg/dL (09 May 2019 11:55)  POCT Blood Glucose.: 108 mg/dL (09 May 2019 08:35)  POCT Blood Glucose.: 137 mg/dL (08 May 2019 20:16)    I&O's Summary    09 May 2019 07:01  -  09 May 2019 14:34  --------------------------------------------------------  IN: 240 mL / OUT: 1000 mL / NET: -760 mL        PHYSICAL EXAM:  GENERAL: NAD, well-developed  HEAD:  Atraumatic, Normocephalic  EYES: EOMI, PERRLA, conjunctiva and sclera clear  CHEST/LUNG: mild bibasilar crackles; No wheeze  HEART: Regular rate and rhythm; No murmurs, rubs, or gallops  ABDOMEN: Soft, Nontender, Nondistended; Bowel sounds present  EXTREMITIES:  2+ Peripheral Pulses, No clubbing, cyanosis, or edema  NEUROLOGY: AAOx3, non-focal  SKIN: No rashes or lesions    LABS:                        7.9    12.9  )-----------( 308      ( 09 May 2019 09:39 )             25.1     05-08    132<L>  |  98  |  36<H>  ----------------------------<  222<H>  4.9   |  18<L>  |  1.24    Ca    9.5      08 May 2019 12:12    TPro  7.7  /  Alb  4.4  /  TBili  0.3  /  DBili  x   /  AST  28  /  ALT  19  /  AlkPhos  99  05-08    PT/INR - ( 08 May 2019 12:13 )   PT: 14.4 sec;   INR: 1.24 ratio         PTT - ( 08 May 2019 12:13 )  PTT:32.7 sec          RADIOLOGY & ADDITIONAL TESTS:    Consultant(s) Notes Reviewed:  GI and Cardiology rec noted     Care Discussed with Consultants/Other Providers: Medicine NP

## 2019-05-10 LAB
ANION GAP SERPL CALC-SCNC: 12 MMOL/L — SIGNIFICANT CHANGE UP (ref 5–17)
BUN SERPL-MCNC: 21 MG/DL — SIGNIFICANT CHANGE UP (ref 7–23)
CALCIUM SERPL-MCNC: 8.7 MG/DL — SIGNIFICANT CHANGE UP (ref 8.4–10.5)
CHLORIDE SERPL-SCNC: 99 MMOL/L — SIGNIFICANT CHANGE UP (ref 96–108)
CO2 SERPL-SCNC: 27 MMOL/L — SIGNIFICANT CHANGE UP (ref 22–31)
CREAT SERPL-MCNC: 1.19 MG/DL — SIGNIFICANT CHANGE UP (ref 0.5–1.3)
GLUCOSE BLDC GLUCOMTR-MCNC: 144 MG/DL — HIGH (ref 70–99)
GLUCOSE BLDC GLUCOMTR-MCNC: 154 MG/DL — HIGH (ref 70–99)
GLUCOSE BLDC GLUCOMTR-MCNC: 241 MG/DL — HIGH (ref 70–99)
GLUCOSE BLDC GLUCOMTR-MCNC: 283 MG/DL — HIGH (ref 70–99)
GLUCOSE SERPL-MCNC: 96 MG/DL — SIGNIFICANT CHANGE UP (ref 70–99)
HCT VFR BLD CALC: 23.9 % — LOW (ref 39–50)
HCT VFR BLD CALC: 26.2 % — LOW (ref 39–50)
HGB BLD-MCNC: 7.2 G/DL — LOW (ref 13–17)
HGB BLD-MCNC: 8.4 G/DL — LOW (ref 13–17)
MCHC RBC-ENTMCNC: 24.2 PG — LOW (ref 27–34)
MCHC RBC-ENTMCNC: 25 PG — LOW (ref 27–34)
MCHC RBC-ENTMCNC: 30.1 GM/DL — LOW (ref 32–36)
MCHC RBC-ENTMCNC: 32.3 GM/DL — SIGNIFICANT CHANGE UP (ref 32–36)
MCV RBC AUTO: 77.4 FL — LOW (ref 80–100)
MCV RBC AUTO: 80.2 FL — SIGNIFICANT CHANGE UP (ref 80–100)
PLATELET # BLD AUTO: 273 K/UL — SIGNIFICANT CHANGE UP (ref 150–400)
PLATELET # BLD AUTO: 291 K/UL — SIGNIFICANT CHANGE UP (ref 150–400)
POTASSIUM SERPL-MCNC: 4.3 MMOL/L — SIGNIFICANT CHANGE UP (ref 3.5–5.3)
POTASSIUM SERPL-SCNC: 4.3 MMOL/L — SIGNIFICANT CHANGE UP (ref 3.5–5.3)
RBC # BLD: 2.98 M/UL — LOW (ref 4.2–5.8)
RBC # BLD: 3.38 M/UL — LOW (ref 4.2–5.8)
RBC # FLD: 16.4 % — HIGH (ref 10.3–14.5)
RBC # FLD: 18.5 % — HIGH (ref 10.3–14.5)
SODIUM SERPL-SCNC: 138 MMOL/L — SIGNIFICANT CHANGE UP (ref 135–145)
WBC # BLD: 12.09 K/UL — HIGH (ref 3.8–10.5)
WBC # BLD: 14.6 K/UL — HIGH (ref 3.8–10.5)
WBC # FLD AUTO: 12.09 K/UL — HIGH (ref 3.8–10.5)
WBC # FLD AUTO: 14.6 K/UL — HIGH (ref 3.8–10.5)

## 2019-05-10 PROCEDURE — 45378 DIAGNOSTIC COLONOSCOPY: CPT | Mod: GC

## 2019-05-10 PROCEDURE — 99233 SBSQ HOSP IP/OBS HIGH 50: CPT

## 2019-05-10 RX ORDER — FUROSEMIDE 40 MG
20 TABLET ORAL ONCE
Refills: 0 | Status: COMPLETED | OUTPATIENT
Start: 2019-05-10 | End: 2019-05-10

## 2019-05-10 RX ADMIN — Medication 20 MILLIGRAM(S): at 18:38

## 2019-05-10 RX ADMIN — PANTOPRAZOLE SODIUM 40 MILLIGRAM(S): 20 TABLET, DELAYED RELEASE ORAL at 06:05

## 2019-05-10 RX ADMIN — Medication 2: at 21:09

## 2019-05-10 RX ADMIN — Medication 40 MILLIGRAM(S): at 18:32

## 2019-05-10 RX ADMIN — Medication 4: at 11:53

## 2019-05-10 RX ADMIN — PANTOPRAZOLE SODIUM 40 MILLIGRAM(S): 20 TABLET, DELAYED RELEASE ORAL at 18:32

## 2019-05-10 NOTE — PROGRESS NOTE ADULT - PROBLEM SELECTOR PLAN 1
due to acute GI bleed s/p 1 units of PRBC on admission, H/h Stable   - GI requesting 1/2 unit PRBC prior to colonoscopy today, ok to give   - Lasix 20mg IV x1 post transfusion due to sever AS  - keep npo at for colonoscopy    - hold off on IVF  - hold asa, and ac

## 2019-05-10 NOTE — PROGRESS NOTE ADULT - ASSESSMENT
64M with HTN, DM2, CHB s/p PPM (SJM, implanted 02/28/2013, last interrogation 03/05/2019 without events, patient with persistent AFL since 09/21/2018), Persistent Atrial Flutter (off apixaban) presents with SOB and weakness in setting of acute on chronic anemia. GI planning Colonoscopy today.    Impression:  1. Severe AS: FOZIA 0.8cm (intermediate gradient), no chest pain or decompensated HF  2. CAD: [facility] 2012 non-obstructive  3. HTN: controlled  4. CHB s/p PPM  5. Persistent Atrial Flutter: CHADSVASC 3, off Apixaban 2/2 GIB    Preoperative Risk Assessment  -RCRI = 2 (10.1% risk of MACE)  -METS>4, no evidence of ACS or decompensated HF  -known Severe AS, intermediate gradient  -active arrythmia: underlying atrial flutter (persistent since 9/2018), PPM dependent 2/2 CHB    Recommendation:  -no cardiac contraindication to proceeding with colonoscopy for anemia and active GIB  -continue to hold ASA and Apixaban in setting of severe blood loss  -consider evaluation for TAVR with structural heart once anemia and GIB addressed    Carter Hernandez M.D.  Cardiology Fellow  [phone]  For all Cardiology service contact information, go to [email or web address] and use "[email or web address]" to login.

## 2019-05-10 NOTE — PROGRESS NOTE ADULT - SUBJECTIVE AND OBJECTIVE BOX
Patient is a 64y old  Male who presents with a chief complaint of I feel weak (10 May 2019 07:42)      SUBJECTIVE / OVERNIGHT EVENTS: Patient seen and examined at bedside. He denies CP, SOB, abd pain, states he has finished the bowel prep, hand has cleared his bowels, is awaiting for Colonoscopy today.     ROS:  All other review of systems negative    Allergies    No Known Drug Allergies  pollen (Rhinitis; Rhinorrhea; Eye Irritation)    Intolerances        MEDICATIONS  (STANDING):  dextrose 5%. 1000 milliLiter(s) (50 mL/Hr) IV Continuous <Continuous>  dextrose 50% Injectable 12.5 Gram(s) IV Push once  dextrose 50% Injectable 25 Gram(s) IV Push once  dextrose 50% Injectable 25 Gram(s) IV Push once  furosemide   Injectable 20 milliGRAM(s) IV Push once  insulin lispro (HumaLOG) corrective regimen sliding scale   SubCutaneous three times a day before meals  pantoprazole  Injectable 40 milliGRAM(s) IV Push every 12 hours  PARoxetine 40 milliGRAM(s) Oral daily    MEDICATIONS  (PRN):  dextrose 40% Gel 15 Gram(s) Oral once PRN Blood Glucose LESS THAN 70 milliGRAM(s)/deciliter  glucagon  Injectable 1 milliGRAM(s) IntraMuscular once PRN Glucose LESS THAN 70 milligrams/deciliter      Vital Signs Last 24 Hrs  T(C): 36.8 (10 May 2019 12:55), Max: 36.8 (09 May 2019 14:13)  T(F): 98.2 (10 May 2019 12:55), Max: 98.3 (09 May 2019 14:13)  HR: 62 (10 May 2019 12:55) (59 - 63)  BP: 120/67 (10 May 2019 12:55) (115/60 - 149/74)  BP(mean): --  RR: 18 (10 May 2019 12:55) (18 - 19)  SpO2: 97% (10 May 2019 12:55) (95% - 100%)  CAPILLARY BLOOD GLUCOSE      POCT Blood Glucose.: 241 mg/dL (10 May 2019 11:42)  POCT Blood Glucose.: 283 mg/dL (10 May 2019 10:31)  POCT Blood Glucose.: 142 mg/dL (09 May 2019 22:15)  POCT Blood Glucose.: 213 mg/dL (09 May 2019 18:05)    I&O's Summary    09 May 2019 07:01  -  10 May 2019 07:00  --------------------------------------------------------  IN: 600 mL / OUT: 1250 mL / NET: -650 mL    10 May 2019 07:01  -  10 May 2019 13:12  --------------------------------------------------------  IN: 295 mL / OUT: 400 mL / NET: -105 mL        PHYSICAL EXAM:  GENERAL: NAD, pale  HEAD:  Atraumatic, Normocephalic  EYES: EOMI, PERRLA, conjunctiva and sclera clear  NECK: Supple, No JVD  CHEST/LUNG: Clear to auscultation bilaterally; No wheeze  HEART: Regular rate and rhythm; + systolic murmurs, rubs, or gallops  ABDOMEN: Soft, Nontender, Nondistended; Bowel sounds present  EXTREMITIES:  2+ Peripheral Pulses, No clubbing, cyanosis, or edema  NEUROLOGY: AAOx3, non-focal  SKIN: No rashes or lesions    LABS:                        7.2    12.09 )-----------( 291      ( 10 May 2019 09:22 )             23.9     05-10    138  |  99  |  21  ----------------------------<  96  4.3   |  27  |  1.19    Ca    8.7      10 May 2019 07:09                RADIOLOGY & ADDITIONAL TESTS:    Consultant(s) Notes Reviewed:  Cardiology rec noted     Care Discussed with Consultants/Other Providers: Medicine PA

## 2019-05-10 NOTE — PROGRESS NOTE ADULT - PROBLEM SELECTOR PLAN 5
- cardiology consult appreciated rec would avoid general anesthesia and no cardiac contraindication to proceeding with colonoscopy given anemia and active GIB  -continue to hold ASA and Apixaban in setting of GIB  -  ECHO results: sever SA, will need evaluation for TAVR with structural heart once anemia and GIB addressed

## 2019-05-10 NOTE — PROGRESS NOTE ADULT - SUBJECTIVE AND OBJECTIVE BOX
CARDIOLOGY PROGRESS NOTE    Interval Events:   No overnight events. Denies chest pain, palpitations, SOB, lightheadedness, dizziness.    Review Of Systems:   CONSTITUTIONAL: No fevers, chills, weakness  EYES/ENT: No visual changes;  No dysphagia  NECK: No pain or stiffness  RESPIRATORY: No cough, wheezing, hemoptysis; No shortness of breath  CARDIOVASCULAR: No chest pain or palpitations; No lower extremity edema  GASTROINTESTINAL: No abdominal or epigastric pain. No nausea, vomiting, or hematemesis; No diarrhea or constipation. No melena or hematochezia.  BACK: No back pain  GENITOURINARY: No dysuria, frequency or hematuria  NEUROLOGICAL: No numbness or weakness  SKIN: No itching, burning, rashes, or lesions   All other review of systems is negative unless indicated above.    Medications:  dextrose 40% Gel 15 Gram(s) Oral once PRN  dextrose 5%. 1000 milliLiter(s) IV Continuous <Continuous>  dextrose 50% Injectable 12.5 Gram(s) IV Push once  dextrose 50% Injectable 25 Gram(s) IV Push once  dextrose 50% Injectable 25 Gram(s) IV Push once  glucagon  Injectable 1 milliGRAM(s) IntraMuscular once PRN  insulin lispro (HumaLOG) corrective regimen sliding scale   SubCutaneous three times a day before meals  pantoprazole  Injectable 40 milliGRAM(s) IV Push every 12 hours  PARoxetine 40 milliGRAM(s) Oral daily    PAST MEDICAL & SURGICAL HISTORY:  Arrhythmia as indication for cardiac pacemaker replacement  HLD (hyperlipidemia)  Depression  Diabetes mellitus type 2 in obese  HTN (hypertension)  Diabetes mellitus type II, controlled, with no complications  Dyslipidemia  HTN (hypertension)  No significant past surgical history      Vitals:  T(F): 97.7 (05-10), Max: 98.3 (05-09)  HR: 60 (05-10) (59 - 68)  BP: 137/62 (05-10) (130/74 - 149/74)  RR: 18 (05-10)  SpO2: 95% (05-10)  I&O's Summary    09 May 2019 07:01  -  10 May 2019 07:00  --------------------------------------------------------  IN: 600 mL / OUT: 1250 mL / NET: -650 mL        Physical Exam:  Appearance: no acute distress  HEENT: PERRL, EOMI  Cardiovascular: no JVD, RRR, normal S1 S2, 3/6 late peaking systolic murmur radiates to carotids, no edema  Respiratory: normal effort, clear to auscultation bilaterally  Gastrointestinal: soft, non-tender, non-distended  Neurologic: AAOx3, non-focal  Psychiatry: mood & affect appropriate  Extremities: warm to touch  Vascular: peripheral pulses palpable 2+ bilaterally  Skin: no rashes, ecchymoses, or cyanosis    Labs:                        7.6    14.20 )-----------( 325      ( 09 May 2019 22:57 )             26.0     05-09    131<L>  |  100  |  23  ----------------------------<  249<H>  4.5   |  21<L>  |  1.31<H>    Ca    8.5      09 May 2019 15:37    TPro  7.7  /  Alb  4.4  /  TBili  0.3  /  DBili  x   /  AST  28  /  ALT  19  /  AlkPhos  99  05-08    PT/INR - ( 08 May 2019 12:13 )   PT: 14.4 sec;   INR: 1.24 ratio    PTT - ( 08 May 2019 12:13 )  PTT:32.7 sec    CARDIOVASCULAR DIAGNOSTIC TESTING:  ECG: Personally Reviewed    [] Echocardiogram:  [] Stress Test:  [] Catheterization:    RADIOLOGY:

## 2019-05-10 NOTE — PROGRESS NOTE ADULT - SUBJECTIVE AND OBJECTIVE BOX
Pre-Endoscopy Evaluation      Referring Physician: dr. rodgers                                   Procedure:  colonoscopy    Indication for Procedure: gib    Pertinent History: 64M with HTN, DM2, CHB s/p PPM (SJM, implanted 02/28/2013, last interrogation 03/05/2019 without events, patient with persistent AFL since 09/21/2018), Persistent Atrial Flutter (off apixaban) presents with SOB and weakness in setting of acute on chronic anemia.       Sedation by Anesthesia [x]    PAST MEDICAL & SURGICAL HISTORY:  Arrhythmia as indication for cardiac pacemaker replacement  HLD (hyperlipidemia)  Depression  Diabetes mellitus type 2 in obese  HTN (hypertension)  Diabetes mellitus type II, controlled, with no complications  Dyslipidemia  HTN (hypertension)  No significant past surgical history      PMH of Gastroparesis [ ]  Gastric Surgery [ ]  Gastric Outlet Obstruction [ ]    Allergies:    No Known Drug Allergies  pollen (Rhinitis; Rhinorrhea; Eye Irritation)    Intolerances:    Latex allergy: [ ] yes [x] no    Medications:MEDICATIONS  (STANDING):  dextrose 5%. 1000 milliLiter(s) (50 mL/Hr) IV Continuous <Continuous>  dextrose 50% Injectable 12.5 Gram(s) IV Push once  dextrose 50% Injectable 25 Gram(s) IV Push once  dextrose 50% Injectable 25 Gram(s) IV Push once  furosemide   Injectable 20 milliGRAM(s) IV Push once  insulin lispro (HumaLOG) corrective regimen sliding scale   SubCutaneous three times a day before meals  pantoprazole  Injectable 40 milliGRAM(s) IV Push every 12 hours  PARoxetine 40 milliGRAM(s) Oral daily    MEDICATIONS  (PRN):  dextrose 40% Gel 15 Gram(s) Oral once PRN Blood Glucose LESS THAN 70 milliGRAM(s)/deciliter  glucagon  Injectable 1 milliGRAM(s) IntraMuscular once PRN Glucose LESS THAN 70 milligrams/deciliter      Smoking: [ ] yes  [x] no    AICD/PPM: [x] yes   [x] no    Pertinent lab data:                        7.2    12.09 )-----------( 291      ( 10 May 2019 09:22 )             23.9     05-10    138  |  99  |  21  ----------------------------<  96  4.3   |  27  |  1.19    Ca    8.7      10 May 2019 07:09    CAPILLARY BLOOD GLUCOSE  POCT Blood Glucose.: 241 mg/dL (10 May 2019 11:42)        < from: Transthoracic Echocardiogram (05.09.19 @ 07:41) >      Fractional short: 28 %  EF (Teicholtz): 54 %  Doppler Peak Velocity (m/sec): AoV=3.7  ------------------------------------------------------------------------  Observations:  Mitral Valve: Mitral annular calcification, otherwise  normal mitral valve. Minimal mitral regurgitation.  Aortic Valve/Aorta: Calcified trileaflet aortic valve with  decreased opening. Peak transaortic valve gradient equals  55 mm Hg, mean transaortic valve gradient equals 34 mm Hg,  estimated aortic valve area equals 0.8 sqcm (by continuity  equation), aortic valve velocity time integral equals 84  cm, consistent with severe aortic stenosis. Peak left  ventricular outflow tract gradient equals 2 mm Hg, mean  gradient is equal to 1 mm Hg, LVOT velocity time integral  equals 17 cm.  Aortic Root: 3.5 cm.  Left Atrium: Mildly dilated left atrium.  LA volumeindex =  40 cc/m2.  Left Ventricle: Mild segmental left ventricular systolic  dysfunction, basal to mid inferoseptal hypokinesis. Basal  septal hypertrophy.  Right Heart: Normal right atrium. The right ventricle is  not well visualized; grossly normal right ventricular  systolic function.   A device wire is noted in the right  heart. Normal tricuspid valve. Minimal tricuspid  regurgitation. Normal pulmonic valve.  Pericardium/Pleura: Normal pericardium with no pericardial  effusion.  Hemodynamic:Estimated right atrial pressure is 8 mm Hg.  Estimated right ventricular systolic pressure equals 46 mm  Hg, assuming right atrial pressure equals 8 mm Hg,  consistent with mild pulmonary hypertension.  ------------------------------------------------------------------------  Conclusions:  1. Calcified trileaflet aortic valve with decreased  opening. Peak transaortic valve gradient equals 55 mm Hg,  mean transaortic valve gradient equals 34 mm Hg, estimated  aortic valve area equals 0.8 sqcm (by continuity equation),  aortic valve velocity time integral equals 84 cm,  consistent with severe aortic stenosis.  2. Mildly dilated left atrium.  LA volume index = 40 cc/m2.  3. Basal septal hypertrophy.  4. Mild segmental left ventricular systolic dysfunction,  basal to mid inferoseptal hypokinesis.  5. Estimated pulmonary artery systolic pressure equals 46  mm Hg, assuming right atrial pressure equals 8 mm Hg,  consistent with mild pulmonary pressures.  *** No recent echocardiogram for comparison.  ------------------------------------------------------------------------------    Physical Examination:    Daily   Vital Signs Last 24 Hrs  T(C): 36.8 (10 May 2019 12:55), Max: 36.8 (09 May 2019 16:57)  T(F): 98.2 (10 May 2019 12:55), Max: 98.3 (09 May 2019 16:57)  HR: 62 (10 May 2019 12:55) (59 - 63)  BP: 120/67 (10 May 2019 12:55) (115/60 - 146/60)  BP(mean): --  RR: 18 (10 May 2019 12:55) (18 - 19)  SpO2: 97% (10 May 2019 12:55) (95% - 100%)    Drug Dosing Weight  Height (cm): 182.88 (08 May 2019 10:54)  Weight (kg): 97.1 (08 May 2019 10:54)  BMI (kg/m2): 29 (08 May 2019 10:54)  BSA (m2): 2.19 (08 May 2019 10:54)    Constitutional: NAD     Neck:  No JVD    Respiratory: CTAB/L    Cardiovascular: S1 and S2    Gastrointestinal: BS+, soft, NT/ND    Extremities: No peripheral edema    Neurological: A/O x 3    : No [place]row    Skin: No rashes    Comments:    ASA Class: I [ ]  II [ ]  III [x]  IV [ ]    The patient is a suitable candidate for the planned procedure unless box checked [ ]  No, explain:

## 2019-05-11 DIAGNOSIS — E87.1 HYPO-OSMOLALITY AND HYPONATREMIA: ICD-10-CM

## 2019-05-11 LAB
ANION GAP SERPL CALC-SCNC: 14 MMOL/L — SIGNIFICANT CHANGE UP (ref 5–17)
BUN SERPL-MCNC: 23 MG/DL — SIGNIFICANT CHANGE UP (ref 7–23)
CALCIUM SERPL-MCNC: 8.6 MG/DL — SIGNIFICANT CHANGE UP (ref 8.4–10.5)
CHLORIDE SERPL-SCNC: 96 MMOL/L — SIGNIFICANT CHANGE UP (ref 96–108)
CO2 SERPL-SCNC: 22 MMOL/L — SIGNIFICANT CHANGE UP (ref 22–31)
CREAT SERPL-MCNC: 1.22 MG/DL — SIGNIFICANT CHANGE UP (ref 0.5–1.3)
GLUCOSE BLDC GLUCOMTR-MCNC: 145 MG/DL — HIGH (ref 70–99)
GLUCOSE BLDC GLUCOMTR-MCNC: 156 MG/DL — HIGH (ref 70–99)
GLUCOSE BLDC GLUCOMTR-MCNC: 260 MG/DL — HIGH (ref 70–99)
GLUCOSE BLDC GLUCOMTR-MCNC: 281 MG/DL — HIGH (ref 70–99)
GLUCOSE SERPL-MCNC: 142 MG/DL — HIGH (ref 70–99)
HCT VFR BLD CALC: 23.6 % — LOW (ref 39–50)
HCT VFR BLD CALC: 25.1 % — LOW (ref 39–50)
HGB BLD-MCNC: 7.6 G/DL — LOW (ref 13–17)
HGB BLD-MCNC: 7.7 G/DL — LOW (ref 13–17)
MCHC RBC-ENTMCNC: 24.1 PG — LOW (ref 27–34)
MCHC RBC-ENTMCNC: 25.5 PG — LOW (ref 27–34)
MCHC RBC-ENTMCNC: 30.3 GM/DL — LOW (ref 32–36)
MCHC RBC-ENTMCNC: 32.5 GM/DL — SIGNIFICANT CHANGE UP (ref 32–36)
MCV RBC AUTO: 78.4 FL — LOW (ref 80–100)
MCV RBC AUTO: 79.7 FL — LOW (ref 80–100)
PLATELET # BLD AUTO: 230 K/UL — SIGNIFICANT CHANGE UP (ref 150–400)
PLATELET # BLD AUTO: 275 K/UL — SIGNIFICANT CHANGE UP (ref 150–400)
POTASSIUM SERPL-MCNC: 4 MMOL/L — SIGNIFICANT CHANGE UP (ref 3.5–5.3)
POTASSIUM SERPL-SCNC: 4 MMOL/L — SIGNIFICANT CHANGE UP (ref 3.5–5.3)
RBC # BLD: 3.01 M/UL — LOW (ref 4.2–5.8)
RBC # BLD: 3.15 M/UL — LOW (ref 4.2–5.8)
RBC # FLD: 16.9 % — HIGH (ref 10.3–14.5)
RBC # FLD: 18.8 % — HIGH (ref 10.3–14.5)
SODIUM SERPL-SCNC: 132 MMOL/L — LOW (ref 135–145)
WBC # BLD: 11.3 K/UL — HIGH (ref 3.8–10.5)
WBC # BLD: 11.47 K/UL — HIGH (ref 3.8–10.5)
WBC # FLD AUTO: 11.3 K/UL — HIGH (ref 3.8–10.5)
WBC # FLD AUTO: 11.47 K/UL — HIGH (ref 3.8–10.5)

## 2019-05-11 PROCEDURE — 99233 SBSQ HOSP IP/OBS HIGH 50: CPT

## 2019-05-11 PROCEDURE — 99232 SBSQ HOSP IP/OBS MODERATE 35: CPT | Mod: GC

## 2019-05-11 RX ORDER — SOD SULF/SODIUM/NAHCO3/KCL/PEG
1000 SOLUTION, RECONSTITUTED, ORAL ORAL ONCE
Refills: 0 | Status: COMPLETED | OUTPATIENT
Start: 2019-05-11 | End: 2019-05-11

## 2019-05-11 RX ORDER — INSULIN LISPRO 100/ML
VIAL (ML) SUBCUTANEOUS AT BEDTIME
Refills: 0 | Status: DISCONTINUED | OUTPATIENT
Start: 2019-05-11 | End: 2019-05-13

## 2019-05-11 RX ADMIN — Medication 1: at 23:14

## 2019-05-11 RX ADMIN — Medication 2: at 18:58

## 2019-05-11 RX ADMIN — Medication 40 MILLIGRAM(S): at 21:27

## 2019-05-11 RX ADMIN — Medication 6: at 12:35

## 2019-05-11 RX ADMIN — PANTOPRAZOLE SODIUM 40 MILLIGRAM(S): 20 TABLET, DELAYED RELEASE ORAL at 05:54

## 2019-05-11 RX ADMIN — Medication 1000 MILLILITER(S): at 18:58

## 2019-05-11 RX ADMIN — PANTOPRAZOLE SODIUM 40 MILLIGRAM(S): 20 TABLET, DELAYED RELEASE ORAL at 18:57

## 2019-05-11 NOTE — PROGRESS NOTE ADULT - PROBLEM SELECTOR PLAN 8
Na: 132 today, likely hypovolemic hyponatremia   Pt received lasix 20 mg IV x2 post transfusion due to bibasliar crackle 2/2 sever AS   monitor BMP, hold off on IVF for now  given sever AS

## 2019-05-11 NOTE — PROGRESS NOTE ADULT - SUBJECTIVE AND OBJECTIVE BOX
Patient is a 64y old  Male who presents with a chief complaint of I feel weak (11 May 2019 09:09)      SUBJECTIVE / OVERNIGHT EVENTS:  no melena/hematochezia  MEDICATIONS  (STANDING):  dextrose 5%. 1000 milliLiter(s) (50 mL/Hr) IV Continuous <Continuous>  dextrose 50% Injectable 12.5 Gram(s) IV Push once  dextrose 50% Injectable 25 Gram(s) IV Push once  dextrose 50% Injectable 25 Gram(s) IV Push once  insulin lispro (HumaLOG) corrective regimen sliding scale   SubCutaneous three times a day before meals  pantoprazole  Injectable 40 milliGRAM(s) IV Push every 12 hours  PARoxetine 40 milliGRAM(s) Oral daily    MEDICATIONS  (PRN):  dextrose 40% Gel 15 Gram(s) Oral once PRN Blood Glucose LESS THAN 70 milliGRAM(s)/deciliter  glucagon  Injectable 1 milliGRAM(s) IntraMuscular once PRN Glucose LESS THAN 70 milligrams/deciliter              PHYSICAL EXAM:  GENERAL: NAD, well-developed  HEAD:  Atraumatic, Normocephalic  EYES: EOMI, PERRLA, conjunctiva and sclera anicteric  NECK: Supple, No JVD  CHEST/LUNG: Clear to auscultation bilaterally; No wheeze  HEART: Regular rate and rhythm; No murmurs, rubs, or gallops  ABDOMEN: Soft, Nontender, Nondistended; Bowel sounds present, no hepatosplenomegaly, no rebound or guarding  EXTREMITIES:  2+ Peripheral Pulses, No clubbing, cyanosis, or edema  PSYCH: AAOx3  NEUROLOGY: non-focal, no asterixis  SKIN: No rashes or lesion    LABS:                        8.4    14.6  )-----------( 273      ( 10 May 2019 18:54 )             26.2     05-11    132<L>  |  96  |  23  ----------------------------<  142<H>  4.0   |  22  |  1.22    Ca    8.6      11 May 2019 06:50                  RADIOLOGY & ADDITIONAL TESTS:

## 2019-05-11 NOTE — PROGRESS NOTE ADULT - SUBJECTIVE AND OBJECTIVE BOX
Patient is a 64y old  Male who presents with a chief complaint of I feel weak (11 May 2019 09:31)      SUBJECTIVE / OVERNIGHT EVENTS: Patient seen and examined at bedside. He has 1 BM denies BRBPR, and dark stool. Denies CP, SOB, abd pain and n/v.     ROS:  All other review of systems negative    Allergies    No Known Drug Allergies  pollen (Rhinitis; Rhinorrhea; Eye Irritation)    Intolerances        MEDICATIONS  (STANDING):  dextrose 5%. 1000 milliLiter(s) (50 mL/Hr) IV Continuous <Continuous>  dextrose 50% Injectable 12.5 Gram(s) IV Push once  dextrose 50% Injectable 25 Gram(s) IV Push once  dextrose 50% Injectable 25 Gram(s) IV Push once  insulin lispro (HumaLOG) corrective regimen sliding scale   SubCutaneous three times a day before meals  pantoprazole  Injectable 40 milliGRAM(s) IV Push every 12 hours  PARoxetine 40 milliGRAM(s) Oral daily    MEDICATIONS  (PRN):  dextrose 40% Gel 15 Gram(s) Oral once PRN Blood Glucose LESS THAN 70 milliGRAM(s)/deciliter  glucagon  Injectable 1 milliGRAM(s) IntraMuscular once PRN Glucose LESS THAN 70 milligrams/deciliter      Vital Signs Last 24 Hrs  T(C): 36.4 (11 May 2019 09:05), Max: 36.9 (10 May 2019 18:32)  T(F): 97.6 (11 May 2019 09:05), Max: 98.5 (10 May 2019 18:32)  HR: 60 (11 May 2019 09:05) (60 - 62)  BP: 163/79 (11 May 2019 09:05) (116/59 - 163/79)  BP(mean): --  RR: 18 (11 May 2019 09:05) (18 - 18)  SpO2: 100% (11 May 2019 09:05) (97% - 100%)  CAPILLARY BLOOD GLUCOSE      POCT Blood Glucose.: 145 mg/dL (11 May 2019 09:09)  POCT Blood Glucose.: 154 mg/dL (10 May 2019 21:06)  POCT Blood Glucose.: 144 mg/dL (10 May 2019 18:41)    I&O's Summary    10 May 2019 07:01  -  11 May 2019 07:00  --------------------------------------------------------  IN: 575 mL / OUT: 950 mL / NET: -375 mL    11 May 2019 07:01  -  11 May 2019 12:11  --------------------------------------------------------  IN: 340 mL / OUT: 0 mL / NET: 340 mL        PHYSICAL EXAM:  GENERAL: NAD,pale   HEAD:  Atraumatic, Normocephalic  EYES: EOMI, PERRLA, conjunctiva and sclera clear  CHEST/LUNG: Clear to auscultation bilaterally; No wheeze  HEART: Regular rate and rhythm; No murmurs, rubs, or gallops  ABDOMEN: Soft, Nontender, Nondistended; Bowel sounds present  EXTREMITIES:  2+ Peripheral Pulses, No clubbing, cyanosis, or edema  NEUROLOGY: AAOx3, non-focal  PSYCH: calm      LABS:                        7.6    11.47 )-----------( 275      ( 11 May 2019 10:42 )             25.1     05-11    132<L>  |  96  |  23  ----------------------------<  142<H>  4.0   |  22  |  1.22    Ca    8.6      11 May 2019 06:50                RADIOLOGY & ADDITIONAL TESTS:    Consultant(s) Notes Reviewed:  GI and cards     Care Discussed with Consultants/Other Providers: medicine NP

## 2019-05-11 NOTE — PROGRESS NOTE ADULT - ASSESSMENT
64M with HTN, DM2, CHB s/p PPM (SJM, implanted 02/28/2013, last interrogation 03/05/2019 without events, patient with persistent AFL since 09/21/2018), Persistent Atrial Flutter (off apixaban) presents with SOB and weakness in setting of acute on chronic anemia. GI planning Colonoscopy today.    Impression:  1. Severe AS: FOZIA 0.8cm (intermediate gradient), no chest pain or decompensated HF  2. CAD: [facility] 2012 non-obstructive  3. HTN: controlled  4. CHB s/p PPM  5. Persistent Atrial Flutter: CHADSVASC 3, off Apixaban 2/2 GIB    Preoperative Risk Assessment  -RCRI = 2 (10.1% risk of MACE)  -METS>4, no evidence of ACS or decompensated HF  -known Severe AS, intermediate gradient  -active arrythmia: underlying atrial flutter (persistent since 9/2018), PPM dependent 2/2 CHB    Recommendation:  -no cardiac contraindication to proceeding with colonoscopy for anemia and active GIB  -continue to hold ASA and Apixaban in setting of severe blood loss  -consider evaluation for TAVR with structural heart once anemia and GIB addressed 64M with HTN, DM2, CHB s/p PPM (SJM, implanted 02/28/2013, last interrogation 03/05/2019 without events, patient with persistent AFL since 09/21/2018), Persistent Atrial Flutter (off apixaban) presents with SOB and weakness in setting of acute on chronic anemia. GI planning Colonoscopy Monday.     Impression:  1. Severe AS: FOZIA 0.8cm (intermediate gradient), no chest pain or decompensated HF  2. CAD: [facility] 2012 non-obstructive  3. HTN: controlled  4. CHB s/p PPM  5. Persistent Atrial Flutter: CHADSVASC 3, off Apixaban 2/2 GIB    Preoperative Risk Assessment  -RCRI = 2 (10.1% risk of MACE)  -METS>4, no evidence of ACS or decompensated HF  -known Severe AS, intermediate gradient  -active arrythmia: underlying atrial flutter (persistent since 9/2018), PPM dependent 2/2 CHB    Recommendation:  -no cardiac contraindication to proceeding with colonoscopy for anemia and active GIB  -continue to hold ASA and Apixaban in setting of severe blood loss, plan for repeat colonoscopy on Monday  -consider evaluation for TAVR with structural heart once anemia and GIB addressed    Christie Marquez MD  Cardiology Fellow PGY-5  [phone]  For all cardiology service contact information, please go to [email or web address] and use password '[email or web address]' 64M with HTN, DM2, CHB s/p PPM (SJM, implanted 02/28/2013, last interrogation 03/05/2019 without events, patient with persistent AFL since 09/21/2018), Persistent Atrial Flutter (off apixaban) presents with SOB and weakness in setting of acute on chronic anemia. GI planning Colonoscopy Monday.     Impression:  1. Severe AS: FOZIA 0.8cm (intermediate gradient), no chest pain or decompensated HF  2. CAD: [facility] 2012 non-obstructive  3. HTN: controlled  4. CHB s/p PPM  5. Persistent Atrial Flutter: CHADSVASC 3, off Apixaban 2/2 GIB    Recommendation:  -continue to hold ASA and Apixaban in setting of severe blood loss, plan for repeat colonoscopy on Monday  -consider evaluation for TAVR with structural heart once anemia and GIB addressed    Christie Marquez MD  Cardiology Fellow PGY-5  [phone]  For all cardiology service contact information, please go to [email or web address] and use password '[email or web address]'

## 2019-05-11 NOTE — PROGRESS NOTE ADULT - ASSESSMENT
Impression:    1. Melena, acute blood loss anemia: EGD w erosive gastropathy. Colonoscopy w poor visualization d/t poor prep, old blood. Rule out Angioectasias, colon cancer. No bleeding overnight.    2. Severe AS    3. afib on Eliquis    Recommendation:  -repeat colonoscopy Monday  -clear liquids tomorrow

## 2019-05-11 NOTE — PROGRESS NOTE ADULT - PROBLEM SELECTOR PLAN 1
due to acute GI bleed s/p 1.5 units of PRBC on admission, H/h Stable   - poor visualization during colonoscopy , repeat colonoscopy marium for Monday per GI, keep on clears tomorrow then NPO at midnight.   - hold off on IVF  - hold asa, and ac  - tx if Hb <7, in 1/2 untis

## 2019-05-11 NOTE — PROGRESS NOTE ADULT - SUBJECTIVE AND OBJECTIVE BOX
Overnight Events:     REVIEW OF SYSTEMS:  CONSTITUTIONAL: No weakness, fevers or chills  EYES/ENT: No visual changes;  No dysphagia  NECK: No pain or stiffness  RESPIRATORY: No cough, wheezing, hemoptysis; No shortness of breath  CARDIOVASCULAR: No chest pain or palpitations; No lower extremity edema  GASTROINTESTINAL: No abdominal or epigastric pain. No nausea, vomiting, or hematemesis; No diarrhea or constipation. No melena or hematochezia.  BACK: No back pain  GENITOURINARY: No dysuria, frequency or hematuria  NEUROLOGICAL: No numbness or weakness  SKIN: No itching, burning, rashes, or lesions   All other review of systems is negative unless indicated above.            Medications:  dextrose 40% Gel 15 Gram(s) Oral once PRN  dextrose 5%. 1000 milliLiter(s) IV Continuous <Continuous>  dextrose 50% Injectable 12.5 Gram(s) IV Push once  dextrose 50% Injectable 25 Gram(s) IV Push once  dextrose 50% Injectable 25 Gram(s) IV Push once  glucagon  Injectable 1 milliGRAM(s) IntraMuscular once PRN  insulin lispro (HumaLOG) corrective regimen sliding scale   SubCutaneous three times a day before meals  pantoprazole  Injectable 40 milliGRAM(s) IV Push every 12 hours  PARoxetine 40 milliGRAM(s) Oral daily      PAST MEDICAL & SURGICAL HISTORY:  Arrhythmia as indication for cardiac pacemaker replacement  HLD (hyperlipidemia)  Depression  Diabetes mellitus type 2 in obese  HTN (hypertension)  Diabetes mellitus type II, controlled, with no complications  Dyslipidemia  HTN (hypertension)  No significant past surgical history      Vitals:  T(F): 98 (05-11), Max: 98.5 (05-10)  HR: 60 (05-11) (60 - 63)  BP: 134/64 (05-11) (115/60 - 134/64)  RR: 18 (05-11)  SpO2: 97% (05-11)  I&O's Summary    10 May 2019 07:01  -  11 May 2019 07:00  --------------------------------------------------------  IN: 575 mL / OUT: 950 mL / NET: -375 mL        Physical Exam:  Appearance: No acute distress; well appearing  Eyes: PERRL, EOMI, pink conjunctiva  HENT: Normal oral muscosa  Cardiovascular: RRR, S1, S2, no murmurs, rubs, or gallops; no edema; no JVD  Respiratory: Clear to auscultation bilaterally  Gastrointestinal: soft, non-tender, non-distended with normal bowel sounds  Musculoskeletal: No clubbing; no joint deformity   Neurologic: Non-focal  Lymphatic: No lymphadenopathy  Psychiatry: AAOx3, mood & affect appropriate  Skin: No rashes, ecchymoses, or cyanosis                          8.4    14.6  )-----------( 273      ( 10 May 2019 18:54 )             26.2     05-11    132<L>  |  96  |  23  ----------------------------<  142<H>  4.0   |  22  |  1.22    Ca    8.6      11 May 2019 06:50 Overnight Events:   EGD w erosive gastropathy. Colonoscopy w poor visualization d/t poor prep, old blood.    No SOB or CP    Medications:  dextrose 40% Gel 15 Gram(s) Oral once PRN  dextrose 5%. 1000 milliLiter(s) IV Continuous <Continuous>  dextrose 50% Injectable 12.5 Gram(s) IV Push once  dextrose 50% Injectable 25 Gram(s) IV Push once  dextrose 50% Injectable 25 Gram(s) IV Push once  glucagon  Injectable 1 milliGRAM(s) IntraMuscular once PRN  insulin lispro (HumaLOG) corrective regimen sliding scale   SubCutaneous three times a day before meals  pantoprazole  Injectable 40 milliGRAM(s) IV Push every 12 hours  PARoxetine 40 milliGRAM(s) Oral daily      PAST MEDICAL & SURGICAL HISTORY:  Arrhythmia as indication for cardiac pacemaker replacement  HLD (hyperlipidemia)  Depression  Diabetes mellitus type 2 in obese  HTN (hypertension)  Diabetes mellitus type II, controlled, with no complications  Dyslipidemia  HTN (hypertension)  No significant past surgical history      Vitals:  T(F): 98 (05-11), Max: 98.5 (05-10)  HR: 60 (05-11) (60 - 63)  BP: 134/64 (05-11) (115/60 - 134/64)  RR: 18 (05-11)  SpO2: 97% (05-11)  I&O's Summary    10 May 2019 07:01  -  11 May 2019 07:00  --------------------------------------------------------  IN: 575 mL / OUT: 950 mL / NET: -375 mL        Physical Exam:  Appearance: no acute distress  HEENT: PERRL, EOMI  Cardiovascular: no JVD, RRR, normal S1 S2, 3/6 late peaking systolic murmur radiates to carotids, no edema  Respiratory: normal effort, clear to auscultation bilaterally  Gastrointestinal: soft, non-tender, non-distended  Neurologic: AAOx3, non-focal  Psychiatry: mood & affect appropriate  Extremities: warm to touch  Vascular: peripheral pulses palpable 2+ bilaterally  Skin: no rashes, ecchymoses, or cyanosis                          8.4    14.6  )-----------( 273      ( 10 May 2019 18:54 )             26.2     05-11    132<L>  |  96  |  23  ----------------------------<  142<H>  4.0   |  22  |  1.22    Ca    8.6      11 May 2019 06:50

## 2019-05-12 LAB
ANION GAP SERPL CALC-SCNC: 13 MMOL/L — SIGNIFICANT CHANGE UP (ref 5–17)
BLD GP AB SCN SERPL QL: NEGATIVE — SIGNIFICANT CHANGE UP
BUN SERPL-MCNC: 24 MG/DL — HIGH (ref 7–23)
CALCIUM SERPL-MCNC: 8.8 MG/DL — SIGNIFICANT CHANGE UP (ref 8.4–10.5)
CHLORIDE SERPL-SCNC: 99 MMOL/L — SIGNIFICANT CHANGE UP (ref 96–108)
CO2 SERPL-SCNC: 25 MMOL/L — SIGNIFICANT CHANGE UP (ref 22–31)
CREAT SERPL-MCNC: 1.12 MG/DL — SIGNIFICANT CHANGE UP (ref 0.5–1.3)
GLUCOSE BLDC GLUCOMTR-MCNC: 137 MG/DL — HIGH (ref 70–99)
GLUCOSE BLDC GLUCOMTR-MCNC: 162 MG/DL — HIGH (ref 70–99)
GLUCOSE BLDC GLUCOMTR-MCNC: 167 MG/DL — HIGH (ref 70–99)
GLUCOSE BLDC GLUCOMTR-MCNC: 190 MG/DL — HIGH (ref 70–99)
GLUCOSE BLDC GLUCOMTR-MCNC: 203 MG/DL — HIGH (ref 70–99)
GLUCOSE SERPL-MCNC: 181 MG/DL — HIGH (ref 70–99)
HCT VFR BLD CALC: 24.7 % — LOW (ref 39–50)
HGB BLD-MCNC: 7.3 G/DL — LOW (ref 13–17)
MCHC RBC-ENTMCNC: 24 PG — LOW (ref 27–34)
MCHC RBC-ENTMCNC: 29.6 GM/DL — LOW (ref 32–36)
MCV RBC AUTO: 81.3 FL — SIGNIFICANT CHANGE UP (ref 80–100)
PLATELET # BLD AUTO: 245 K/UL — SIGNIFICANT CHANGE UP (ref 150–400)
POTASSIUM SERPL-MCNC: 3.8 MMOL/L — SIGNIFICANT CHANGE UP (ref 3.5–5.3)
POTASSIUM SERPL-SCNC: 3.8 MMOL/L — SIGNIFICANT CHANGE UP (ref 3.5–5.3)
RBC # BLD: 3.04 M/UL — LOW (ref 4.2–5.8)
RBC # FLD: 16.8 % — HIGH (ref 10.3–14.5)
RH IG SCN BLD-IMP: POSITIVE — SIGNIFICANT CHANGE UP
SODIUM SERPL-SCNC: 137 MMOL/L — SIGNIFICANT CHANGE UP (ref 135–145)
WBC # BLD: 10.71 K/UL — HIGH (ref 3.8–10.5)
WBC # FLD AUTO: 10.71 K/UL — HIGH (ref 3.8–10.5)

## 2019-05-12 PROCEDURE — 99233 SBSQ HOSP IP/OBS HIGH 50: CPT

## 2019-05-12 PROCEDURE — 99232 SBSQ HOSP IP/OBS MODERATE 35: CPT | Mod: GC

## 2019-05-12 RX ORDER — SOD SULF/SODIUM/NAHCO3/KCL/PEG
2000 SOLUTION, RECONSTITUTED, ORAL ORAL ONCE
Refills: 0 | Status: COMPLETED | OUTPATIENT
Start: 2019-05-12 | End: 2019-05-12

## 2019-05-12 RX ADMIN — Medication 2000 MILLILITER(S): at 18:26

## 2019-05-12 RX ADMIN — Medication 40 MILLIGRAM(S): at 21:28

## 2019-05-12 RX ADMIN — Medication 2000 MILLILITER(S): at 23:09

## 2019-05-12 RX ADMIN — Medication 10 MILLIGRAM(S): at 16:35

## 2019-05-12 RX ADMIN — Medication 4: at 08:52

## 2019-05-12 RX ADMIN — PANTOPRAZOLE SODIUM 40 MILLIGRAM(S): 20 TABLET, DELAYED RELEASE ORAL at 05:06

## 2019-05-12 RX ADMIN — PANTOPRAZOLE SODIUM 40 MILLIGRAM(S): 20 TABLET, DELAYED RELEASE ORAL at 18:27

## 2019-05-12 RX ADMIN — Medication 10 MILLIGRAM(S): at 21:28

## 2019-05-12 RX ADMIN — Medication 2: at 13:32

## 2019-05-12 NOTE — PROGRESS NOTE ADULT - SUBJECTIVE AND OBJECTIVE BOX
Patient is a 64y old  Male who presents with a chief complaint of I feel weak (11 May 2019 12:11)      SUBJECTIVE / OVERNIGHT EVENTS:  no melena overnight  MEDICATIONS  (STANDING):  dextrose 5%. 1000 milliLiter(s) (50 mL/Hr) IV Continuous <Continuous>  dextrose 50% Injectable 12.5 Gram(s) IV Push once  dextrose 50% Injectable 25 Gram(s) IV Push once  dextrose 50% Injectable 25 Gram(s) IV Push once  insulin lispro (HumaLOG) corrective regimen sliding scale   SubCutaneous three times a day before meals  insulin lispro (HumaLOG) corrective regimen sliding scale   SubCutaneous at bedtime  pantoprazole  Injectable 40 milliGRAM(s) IV Push every 12 hours  PARoxetine 40 milliGRAM(s) Oral daily    MEDICATIONS  (PRN):  dextrose 40% Gel 15 Gram(s) Oral once PRN Blood Glucose LESS THAN 70 milliGRAM(s)/deciliter  glucagon  Injectable 1 milliGRAM(s) IntraMuscular once PRN Glucose LESS THAN 70 milligrams/deciliter              PHYSICAL EXAM:  GENERAL: NAD, well-developed  HEAD:  Atraumatic, Normocephalic  EYES: EOMI, PERRLA, conjunctiva and sclera anicteric  NECK: Supple, No JVD  CHEST/LUNG: Clear to auscultation bilaterally; No wheeze  HEART: Regular rate and rhythm; + systolic murmur, rubs, or gallops  ABDOMEN: Soft, Nontender, Nondistended; Bowel sounds present, no hepatosplenomegaly, no rebound or guarding  EXTREMITIES:  2+ Peripheral Pulses, No clubbing, cyanosis, or edema  PSYCH: AAOx3  NEUROLOGY: non-focal, no asterixis  SKIN: No rashes or lesion    LABS:                        7.3    10.71 )-----------( 245      ( 12 May 2019 09:35 )             24.7     05-12    137  |  99  |  24<H>  ----------------------------<  181<H>  3.8   |  25  |  1.12    Ca    8.8      12 May 2019 06:37                  RADIOLOGY & ADDITIONAL TESTS:

## 2019-05-12 NOTE — PROGRESS NOTE ADULT - SUBJECTIVE AND OBJECTIVE BOX
Patient is a 64y old  Male who presents with a chief complaint of I feel weak (12 May 2019 10:26)      SUBJECTIVE / OVERNIGHT EVENTS: Patient seen and examined at bedside. She denies any CP, SOb, abd pain and n/v. Per wife at bedside, pt was confused this morning, "stating his wife and mother-in-law were arguing about politics" when they were not present in the hospital. He is now back to baseline line is alert and oriented x3.     ROS:  All other review of systems negative    Allergies    No Known Drug Allergies  pollen (Rhinitis; Rhinorrhea; Eye Irritation)    Intolerances        MEDICATIONS  (STANDING):  dextrose 5%. 1000 milliLiter(s) (50 mL/Hr) IV Continuous <Continuous>  dextrose 50% Injectable 12.5 Gram(s) IV Push once  dextrose 50% Injectable 25 Gram(s) IV Push once  dextrose 50% Injectable 25 Gram(s) IV Push once  insulin lispro (HumaLOG) corrective regimen sliding scale   SubCutaneous three times a day before meals  insulin lispro (HumaLOG) corrective regimen sliding scale   SubCutaneous at bedtime  pantoprazole  Injectable 40 milliGRAM(s) IV Push every 12 hours  PARoxetine 40 milliGRAM(s) Oral daily    MEDICATIONS  (PRN):  dextrose 40% Gel 15 Gram(s) Oral once PRN Blood Glucose LESS THAN 70 milliGRAM(s)/deciliter  glucagon  Injectable 1 milliGRAM(s) IntraMuscular once PRN Glucose LESS THAN 70 milligrams/deciliter      Vital Signs Last 24 Hrs  T(C): 36.7 (12 May 2019 09:04), Max: 37.1 (11 May 2019 17:13)  T(F): 98 (12 May 2019 09:04), Max: 98.8 (11 May 2019 17:13)  HR: 59 (12 May 2019 09:04) (59 - 62)  BP: 147/71 (12 May 2019 09:04) (109/64 - 157/75)  BP(mean): --  RR: 18 (12 May 2019 09:04) (18 - 18)  SpO2: 99% (12 May 2019 09:04) (94% - 99%)  CAPILLARY BLOOD GLUCOSE      POCT Blood Glucose.: 203 mg/dL (12 May 2019 08:49)  POCT Blood Glucose.: 281 mg/dL (11 May 2019 22:18)  POCT Blood Glucose.: 156 mg/dL (11 May 2019 18:55)    I&O's Summary    11 May 2019 07:01  -  12 May 2019 07:00  --------------------------------------------------------  IN: 1160 mL / OUT: 0 mL / NET: 1160 mL    12 May 2019 07:01  -  12 May 2019 12:15  --------------------------------------------------------  IN: 340 mL / OUT: 0 mL / NET: 340 mL        PHYSICAL EXAM:  GENERAL: NAD, well-developed, pale   HEAD:  Atraumatic, Normocephalic  EYES: EOMI, PERRLA, conjunctiva and sclera clear  CHEST/LUNG: Clear to auscultation bilaterally; No wheeze  HEART: Regular rate and rhythm; No murmurs, rubs, or gallops  ABDOMEN: Soft, Nontender, Nondistended; Bowel sounds present  EXTREMITIES:  2+ Peripheral Pulses, No clubbing, cyanosis, or edema  NEUROLOGY: AAOx3, non-focal  SKIN: No rashes or lesions    LABS:                        7.3    10.71 )-----------( 245      ( 12 May 2019 09:35 )             24.7     05-12    137  |  99  |  24<H>  ----------------------------<  181<H>  3.8   |  25  |  1.12    Ca    8.8      12 May 2019 06:37                RADIOLOGY & ADDITIONAL TESTS:    Imaging Personally Reviewed:    Consultant(s) Notes Reviewed:  GI rec noted     Care Discussed with Consultants/Other Providers: medicine PA     Case Discussed with wife and mother-in-law

## 2019-05-12 NOTE — PROGRESS NOTE ADULT - ASSESSMENT
Impression:    1. Melena, acute blood loss anemia: EGD on 4/22 w erosive gastropathy negative for H pylori. Colonoscopy 5/10 w poor visualization d/t poor prep, old blood. Rule out missed upper GI lesion, colonic angioectasias, colon cancer. No bleeding overnight.     2. Severe AS    3. afib on Eliquis    Recommendation:  -repeat colonoscopy Monday, +/- second look EGD  -suggest giving 1 U RBC for optimization prior to anesthesia tomorrow  -PPI BID

## 2019-05-12 NOTE — PROGRESS NOTE ADULT - PROBLEM SELECTOR PLAN 8
resolved, likely hypovolemic hyponatremia   Pt received lasix 20 mg IV x2 post transfusion due to bibasliar crackle 2/2 sever AS   monitor BMP

## 2019-05-12 NOTE — PROGRESS NOTE ADULT - PROBLEM SELECTOR PLAN 1
due to acute GI bleed s/p 1.5 units of PRBC on admission, H/h Stable   - poor visualization during colonoscopy , repeat colonoscopy marium for Monday per GI, keep on clears today then NPO at midnight.   - hold off on IVF  - hold asa, and ac  - tx if Hb <7, in 1/2 units, GI requesting PRBC prior to procedure tomorrow, would live 1/2 units at a time due to sever AS, monitor for fluid overload.

## 2019-05-13 ENCOUNTER — RESULT REVIEW (OUTPATIENT)
Age: 64
End: 2019-05-13

## 2019-05-13 LAB
ANION GAP SERPL CALC-SCNC: 15 MMOL/L — SIGNIFICANT CHANGE UP (ref 5–17)
BUN SERPL-MCNC: 14 MG/DL — SIGNIFICANT CHANGE UP (ref 7–23)
CALCIUM SERPL-MCNC: 8.7 MG/DL — SIGNIFICANT CHANGE UP (ref 8.4–10.5)
CEA SERPL-MCNC: 3.6 NG/ML — SIGNIFICANT CHANGE UP (ref 0–3.8)
CHLORIDE SERPL-SCNC: 93 MMOL/L — LOW (ref 96–108)
CO2 SERPL-SCNC: 25 MMOL/L — SIGNIFICANT CHANGE UP (ref 22–31)
CREAT SERPL-MCNC: 1.21 MG/DL — SIGNIFICANT CHANGE UP (ref 0.5–1.3)
GLUCOSE BLDC GLUCOMTR-MCNC: 173 MG/DL — HIGH (ref 70–99)
GLUCOSE BLDC GLUCOMTR-MCNC: 181 MG/DL — HIGH (ref 70–99)
GLUCOSE BLDC GLUCOMTR-MCNC: 196 MG/DL — HIGH (ref 70–99)
GLUCOSE BLDC GLUCOMTR-MCNC: 270 MG/DL — HIGH (ref 70–99)
GLUCOSE SERPL-MCNC: 164 MG/DL — HIGH (ref 70–99)
HCT VFR BLD CALC: 27.3 % — LOW (ref 39–50)
HGB BLD-MCNC: 8.1 G/DL — LOW (ref 13–17)
MCHC RBC-ENTMCNC: 23.5 PG — LOW (ref 27–34)
MCHC RBC-ENTMCNC: 29.7 GM/DL — LOW (ref 32–36)
MCV RBC AUTO: 79.4 FL — LOW (ref 80–100)
PLATELET # BLD AUTO: 250 K/UL — SIGNIFICANT CHANGE UP (ref 150–400)
POTASSIUM SERPL-MCNC: 3.5 MMOL/L — SIGNIFICANT CHANGE UP (ref 3.5–5.3)
POTASSIUM SERPL-SCNC: 3.5 MMOL/L — SIGNIFICANT CHANGE UP (ref 3.5–5.3)
RBC # BLD: 3.44 M/UL — LOW (ref 4.2–5.8)
RBC # FLD: 17 % — HIGH (ref 10.3–14.5)
SODIUM SERPL-SCNC: 133 MMOL/L — LOW (ref 135–145)
WBC # BLD: 10.65 K/UL — HIGH (ref 3.8–10.5)
WBC # FLD AUTO: 10.65 K/UL — HIGH (ref 3.8–10.5)

## 2019-05-13 PROCEDURE — 43235 EGD DIAGNOSTIC BRUSH WASH: CPT

## 2019-05-13 PROCEDURE — 99233 SBSQ HOSP IP/OBS HIGH 50: CPT

## 2019-05-13 PROCEDURE — 45380 COLONOSCOPY AND BIOPSY: CPT

## 2019-05-13 PROCEDURE — 88305 TISSUE EXAM BY PATHOLOGIST: CPT | Mod: 26

## 2019-05-13 PROCEDURE — 74177 CT ABD & PELVIS W/CONTRAST: CPT | Mod: 26

## 2019-05-13 RX ORDER — INSULIN LISPRO 100/ML
VIAL (ML) SUBCUTANEOUS EVERY 6 HOURS
Refills: 0 | Status: DISCONTINUED | OUTPATIENT
Start: 2019-05-13 | End: 2019-05-13

## 2019-05-13 RX ORDER — INSULIN LISPRO 100/ML
VIAL (ML) SUBCUTANEOUS AT BEDTIME
Refills: 0 | Status: DISCONTINUED | OUTPATIENT
Start: 2019-05-13 | End: 2019-05-14

## 2019-05-13 RX ORDER — FUROSEMIDE 40 MG
20 TABLET ORAL ONCE
Refills: 0 | Status: COMPLETED | OUTPATIENT
Start: 2019-05-13 | End: 2019-05-13

## 2019-05-13 RX ORDER — INSULIN LISPRO 100/ML
VIAL (ML) SUBCUTANEOUS
Refills: 0 | Status: DISCONTINUED | OUTPATIENT
Start: 2019-05-13 | End: 2019-05-14

## 2019-05-13 RX ORDER — PANTOPRAZOLE SODIUM 20 MG/1
40 TABLET, DELAYED RELEASE ORAL
Refills: 0 | Status: DISCONTINUED | OUTPATIENT
Start: 2019-05-13 | End: 2019-05-14

## 2019-05-13 RX ADMIN — Medication 20 MILLIGRAM(S): at 02:28

## 2019-05-13 RX ADMIN — Medication 1: at 12:45

## 2019-05-13 RX ADMIN — Medication 1: at 22:51

## 2019-05-13 RX ADMIN — Medication 1: at 07:14

## 2019-05-13 RX ADMIN — PANTOPRAZOLE SODIUM 40 MILLIGRAM(S): 20 TABLET, DELAYED RELEASE ORAL at 06:04

## 2019-05-13 RX ADMIN — Medication 1: at 17:57

## 2019-05-13 RX ADMIN — PANTOPRAZOLE SODIUM 40 MILLIGRAM(S): 20 TABLET, DELAYED RELEASE ORAL at 17:57

## 2019-05-13 RX ADMIN — Medication 40 MILLIGRAM(S): at 22:51

## 2019-05-13 NOTE — CHART NOTE - NSCHARTNOTEFT_GEN_A_CORE
Interval events Interval events    CC: evaluated the patient  after PRBC transfusion for volume status  Patient sitting in chair, a&Ox3, in no acute distress  Hemodynamically stable    Vital Signs Last 24 Hrs  T(C): 36.6 (13 May 2019 02:15), Max: 36.8 (12 May 2019 13:12)  T(F): 97.9 (13 May 2019 02:15), Max: 98.2 (12 May 2019 13:12)  HR: 94 (13 May 2019 02:15) (57 - 94)  BP: 142/63 (13 May 2019 02:15) (116/65 - 159/75)  BP(mean): --  RR: 16 (13 May 2019 02:15) (16 - 19)  SpO2: 94% (13 May 2019 02:15) (94% - 99%)                          7.3    10.71 )-----------( 245      ( 12 May 2019 09:35 )             24.7     Physical Exam:  Appearance: no acute distress  Cardiovascular: no JVD, RRR, normal S1 S2  Respiratory: fine Bibasilar crackles  Gastrointestinal: soft, non-tender, non-distended  Neurologic: AAOx3, non-focal  Psychiatry: mood & affect appropriate  Extremities: warm to touch  Vascular: peripheral pulses palpable 2+ bilaterally  Skin: no rashes, ecchymoses, or cyanosis    64 year old male with a PMH of GERD, T2DM, HTN, anxiety, Atrial flutter (on apixaban), severe aortic stenosis, third degree av block s/p PPM p/w weaknes found to have decreased Hb concern for acute blood loss anemia.  Patient for colonoscopy in am.  PATIENT S/P 1/2 UNIT prbc  Bibasilar crackles on lung exam  Lasix 20mg IVP ordered, to be administered prior to 2nd unit of PRBc transfusion  Post transfusion CBC   Will follow    Junior Denney P BC  [id number]

## 2019-05-13 NOTE — PROGRESS NOTE ADULT - PROBLEM SELECTOR PLAN 8
-Possibly hypovolemic hyponatremia.   -Pt received lasix 20 mg IV x 1 during transfusions on 5/13 due to bibasilar crackles 2/2 severe AS   -Monitor BMP.    9. Prophylactic measure: -SCD's for DVT PPx in view of recent GI bleed.

## 2019-05-13 NOTE — PROGRESS NOTE ADULT - ASSESSMENT
64 year old male with a PMH of GERD, DM-2, HTN, anxiety, Atrial flutter (on apixaban), severe aortic stenosis, third degree av block s/p PPM; presented with weakness found to have decreased Hgb concern for acute blood loss anemia.

## 2019-05-13 NOTE — PROGRESS NOTE ADULT - SUBJECTIVE AND OBJECTIVE BOX
Patient is a 64y old  Male who presents with a chief complaint of I feel weak (13 May 2019 11:38)        SUBJECTIVE / OVERNIGHT EVENTS: Patient denies blood in stool. Patient denies abdominal pain, CP, SOB, or pain.       MEDICATIONS  (STANDING):  dextrose 5%. 1000 milliLiter(s) (50 mL/Hr) IV Continuous <Continuous>  dextrose 50% Injectable 12.5 Gram(s) IV Push once  dextrose 50% Injectable 25 Gram(s) IV Push once  dextrose 50% Injectable 25 Gram(s) IV Push once  insulin lispro (HumaLOG) corrective regimen sliding scale   SubCutaneous three times a day before meals  insulin lispro (HumaLOG) corrective regimen sliding scale   SubCutaneous at bedtime  pantoprazole    Tablet 40 milliGRAM(s) Oral two times a day  PARoxetine 40 milliGRAM(s) Oral daily    MEDICATIONS  (PRN):  dextrose 40% Gel 15 Gram(s) Oral once PRN Blood Glucose LESS THAN 70 milliGRAM(s)/deciliter  glucagon  Injectable 1 milliGRAM(s) IntraMuscular once PRN Glucose LESS THAN 70 milligrams/deciliter      Vital Signs Last 24 Hrs  T(C): 36.8 (13 May 2019 14:24), Max: 36.8 (12 May 2019 17:39)  T(F): 98.3 (13 May 2019 14:24), Max: 98.3 (13 May 2019 14:24)  HR: 85 (13 May 2019 14:24) (60 - 85)  BP: 151/74 (13 May 2019 14:24) (127/57 - 159/75)  BP(mean): --  RR: 18 (13 May 2019 14:24) (16 - 19)  SpO2: 96% (13 May 2019 14:24) (94% - 99%)  CAPILLARY BLOOD GLUCOSE      POCT Blood Glucose.: 181 mg/dL (13 May 2019 12:43)  POCT Blood Glucose.: 196 mg/dL (13 May 2019 06:20)  POCT Blood Glucose.: 167 mg/dL (12 May 2019 21:53)  POCT Blood Glucose.: 137 mg/dL (12 May 2019 18:10)    I&O's Summary    12 May 2019 07:01  -  13 May 2019 07:00  --------------------------------------------------------  IN: 1860 mL / OUT: 200 mL / NET: 1660 mL    13 May 2019 07:01  -  13 May 2019 16:12  --------------------------------------------------------  IN: 480 mL / OUT: 0 mL / NET: 480 mL          PHYSICAL EXAM:   GENERAL: NAD, well-developed  HEAD:  Atraumatic, Normocephalic  EYES: Conjunctiva and sclera pale.   NECK: Supple  CHEST/LUNG: Clear, but somewhat distant BS.   HEART: S1S2 normal. Regular rate and rhythm; harsh systolic murmur.   ABDOMEN: Soft, Nontender, Nondistended; Bowel sounds present  EXTREMITIES:  Trace LE edema  PSYCH/Neuro: AAOx3. Non-focal.   SKIN: No rashes or lesions      LABS:                        8.1    10.65 )-----------( 250      ( 13 May 2019 09:10 )             27.3     05-13    133<L>  |  93<L>  |  14  ----------------------------<  164<H>  3.5   |  25  |  1.21    Ca    8.7      13 May 2019 07:29            < from: Upper Endoscopy (05.13.19 @ 08:13) >  Impression:          - Non-bleeding gastric ulcer with a clean ulcer base (Yong Class III).                       - Gastritis.                       - No active bleeding seen  Recommendation:      - Return patient to [facility] for [facility].                       - Resume regular diet.                       - Use Protonix (pantoprazole) 40 mg PO BID.                       - Check serum H. pylori antibody, consider empiric treatment if positive.                       - Proceed to colonoscopy    < end of copied text >      < from: Colonoscopy (05.13.19 @ 08:02) >  Impression:          - Preparation of the colon was suboptimal                       - A single (solitary) ulcer in the cecum/ascending colon was found, likely                        source of bleeding. Biopsied multiple times to rule out malignancy                       - Internal hemorrhoids.  Recommendation:      - Return patient to [facility] for ongoing care.                       - Resume regular diet.                       - Repeat colonoscopy in 1 month to check healing.                       - Consider CT A/P to evaluate for malignancy.                       - Check CEA level                       - Await pathology results.    < end of copied text >        RADIOLOGY & ADDITIONAL TESTS:    Imaging Personally Reviewed: EGD/colonoscopy reports.   Consultant(s) Notes Reviewed:  GI  Care Discussed with Consultants/Other Providers: GI fellow and cardio fellow

## 2019-05-13 NOTE — PROGRESS NOTE ADULT - PROBLEM SELECTOR PLAN 5
-Cardiology consult recs appreciated for pre-procedure risk stratifications.   -continue to hold ASA and Apixaban for now, tentative plan as above.   -ECHO results: severe AS, will need evaluation for TAVR with structural heart once anemia and GIB addressed

## 2019-05-13 NOTE — PROGRESS NOTE ADULT - SUBJECTIVE AND OBJECTIVE BOX
Pre-Endoscopy Evaluation      Referring Physician: dr. rodgers                                  Procedure: colonoscopy +/-  upper gastrointestinal endoscopy     Indication for Procedure: gib    Pertinent History: 64y male PMH below with melena, acute blood loss anemia: EGD on 4/22 w erosive gastropathy negative for H pylori. Colonoscopy 5/10 w poor visualization due to poor prep, old blood  repeat colonoscopy Monday, +/- second look EGD    Sedation by Anesthesia [x]    PAST MEDICAL & SURGICAL HISTORY:  Severe Aortic stenosis  A-fib  Pacemaker  Arrhythmia as indication for cardiac pacemaker replacement  HLD (hyperlipidemia)  Depression  Diabetes mellitus type 2 in obese  HTN (hypertension)  Diabetes mellitus type II, controlled, with no complications  Dyslipidemia  HTN (hypertension)  No significant past surgical history      PMH of Gastroparesis [ ]  Gastric Surgery [ ]  Gastric Outlet Obstruction [ ]    Allergies    No Known Drug Allergies  pollen (Rhinitis; Rhinorrhea; Eye Irritation)    Intolerances    Latex allergy: [ ] yes [x] no    Medications:MEDICATIONS  (STANDING):  dextrose 5%. 1000 milliLiter(s) (50 mL/Hr) IV Continuous <Continuous>  dextrose 50% Injectable 12.5 Gram(s) IV Push once  dextrose 50% Injectable 25 Gram(s) IV Push once  dextrose 50% Injectable 25 Gram(s) IV Push once  insulin lispro (HumaLOG) corrective regimen sliding scale   SubCutaneous every 6 hours  pantoprazole  Injectable 40 milliGRAM(s) IV Push every 12 hours  PARoxetine 40 milliGRAM(s) Oral daily    MEDICATIONS  (PRN):  dextrose 40% Gel 15 Gram(s) Oral once PRN Blood Glucose LESS THAN 70 milliGRAM(s)/deciliter  glucagon  Injectable 1 milliGRAM(s) IntraMuscular once PRN Glucose LESS THAN 70 milligrams/deciliter      Smoking: [ ] yes  [x] no    AICD/PPM: [ ] yes   [x] no    Pertinent lab data:                        7.3    10.71 )-----------( 245      ( 12 May 2019 09:35 )             24.7     05-12    137  |  99  |  24<H>  ----------------------------<  181<H>  3.8   |  25  |  1.12    Ca    8.8      12 May 2019 06:37      CAPILLARY BLOOD GLUCOSE  POCT Blood Glucose.: 196 mg/dL (13 May 2019 06:20)      < from: Transthoracic Echocardiogram (05.09.19 @ 07:41) >      Fractional short: 28 %  EF (Teicholtz): 54 %  Doppler Peak Velocity (m/sec): AoV=3.7  ------------------------------------------------------------------------  Observations:  Mitral Valve: Mitral annular calcification, otherwise  normal mitral valve. Minimal mitral regurgitation.  Aortic Valve/Aorta: Calcified trileaflet aortic valve with  decreased opening. Peak transaortic valve gradient equals  55 mm Hg, mean transaortic valve gradient equals 34 mm Hg,  estimated aortic valve area equals 0.8 sqcm (by continuity  equation), aortic valve velocity time integral equals 84  cm, consistent with severe aortic stenosis. Peak left  ventricular outflow tract gradient equals 2 mm Hg, mean  gradient is equal to 1 mm Hg, LVOT velocity time integral  equals 17 cm.  Aortic Root: 3.5 cm.  Left Atrium: Mildly dilated left atrium.  LA volumeindex =  40 cc/m2.  Left Ventricle: Mild segmental left ventricular systolic  dysfunction, basal to mid inferoseptal hypokinesis. Basal  septal hypertrophy.  Right Heart: Normal right atrium. The right ventricle is  not well visualized; grossly normal right ventricular  systolic function.   A device wire is noted in the right  heart. Normal tricuspid valve. Minimal tricuspid  regurgitation. Normal pulmonic valve.  Pericardium/Pleura: Normal pericardium with no pericardial  effusion.  Hemodynamic:Estimated right atrial pressure is 8 mm Hg.  Estimated right ventricular systolic pressure equals 46 mm  Hg, assuming right atrial pressure equals 8 mm Hg,  consistent with mild pulmonary hypertension.  ------------------------------------------------------------------------  Conclusions:  1. Calcified trileaflet aortic valve with decreased  opening. Peak transaortic valve gradient equals 55 mm Hg,  mean transaortic valve gradient equals 34 mm Hg, estimated  aortic valve area equals 0.8 sqcm (by continuity equation),  aortic valve velocity time integral equals 84 cm,  consistent with severe aortic stenosis.  2. Mildly dilated left atrium.  LA volume index = 40 cc/m2.  3. Basal septal hypertrophy.  4. Mild segmental left ventricular systolic dysfunction,  basal to mid inferoseptal hypokinesis.  5. Estimated pulmonary artery systolic pressure equals 46  mm Hg, assuming right atrial pressure equals 8 mm Hg,  consistent with mild pulmonary pressures.  *** No recent echocardiogram for comparison.  -----------------------------------------------------------------------        Physical Examination:    Daily   Vital Signs Last 24 Hrs  T(C): 36.7 (13 May 2019 05:50), Max: 36.8 (12 May 2019 13:12)  T(F): 98.1 (13 May 2019 05:50), Max: 98.2 (12 May 2019 13:12)  HR: 61 (13 May 2019 05:50) (57 - 64)  BP: 145/55 (13 May 2019 05:50) (127/57 - 159/75)  BP(mean): --  RR: 18 (13 May 2019 05:50) (16 - 19)  SpO2: 99% (13 May 2019 05:50) (94% - 99%)    Drug Dosing Weight  Height (cm): 182.88 (08 May 2019 10:54)  Weight (kg): 97.1 (08 May 2019 10:54)  BMI (kg/m2): 29 (08 May 2019 10:54)  BSA (m2): 2.19 (08 May 2019 10:54)    Constitutional: NAD     Neck:  No JVD    Respiratory: CTAB/L    Cardiovascular: S1 and S2    Gastrointestinal: BS+, soft, NT/ND    Extremities: No peripheral edema    Neurological: Awake, alert, responsive; + tremors    : No Morrow    Skin: No rashes    Comments:    ASA Class: I [ ]  II [ ]  III [ ]  IV [x]    The patient is a suitable candidate for the planned procedure unless box checked [ ]  No, explain:

## 2019-05-13 NOTE — PROGRESS NOTE ADULT - PROBLEM SELECTOR PLAN 1
-Due to acute GI bleed s/p 1.5 units of PRBC on admission, H/H so far pretty stable.   -Poor visualization during colonoscopy 5/10, repeat colonoscopy 5/13 showed ulcer in cecum/ascending colon s/p biopsy. Concern for ?malignancy. Will get CEA and CT abd/pelvis C+ to further eval.    -Received 2 additional 1/2 units PRBC on 5/12, prior to colonoscopy.   -Continue to hold ASA and AC, pending further work up. Discussed with GI, likely can resume within 72 hours of biopsy. Cards says to resume based on GI recs, but AC more important for Afib and ASA less important given patient only has non-obstructive CAD.   -Transfuse if Hgb <7, in 1/2 units, in view of severe AS, to avoid flash pulmonary edema.  -EGD on 5/13 showed a non-bleeding gastric ulcer. C/w PPI BID. -H. pylori Ab sent.

## 2019-05-14 ENCOUNTER — TRANSCRIPTION ENCOUNTER (OUTPATIENT)
Age: 64
End: 2019-05-14

## 2019-05-14 VITALS
DIASTOLIC BLOOD PRESSURE: 64 MMHG | HEART RATE: 59 BPM | OXYGEN SATURATION: 94 % | SYSTOLIC BLOOD PRESSURE: 128 MMHG | TEMPERATURE: 98 F | RESPIRATION RATE: 18 BRPM

## 2019-05-14 LAB
ALBUMIN SERPL ELPH-MCNC: 3.9 G/DL — SIGNIFICANT CHANGE UP (ref 3.3–5)
ALP SERPL-CCNC: 88 U/L — SIGNIFICANT CHANGE UP (ref 40–120)
ALT FLD-CCNC: 36 U/L — SIGNIFICANT CHANGE UP (ref 10–45)
ANION GAP SERPL CALC-SCNC: 17 MMOL/L — SIGNIFICANT CHANGE UP (ref 5–17)
ANISOCYTOSIS BLD QL: SLIGHT — SIGNIFICANT CHANGE UP
AST SERPL-CCNC: 53 U/L — HIGH (ref 10–40)
BASOPHILS # BLD AUTO: 0.06 K/UL — SIGNIFICANT CHANGE UP (ref 0–0.2)
BASOPHILS NFR BLD AUTO: 0.5 % — SIGNIFICANT CHANGE UP (ref 0–2)
BILIRUB SERPL-MCNC: 0.6 MG/DL — SIGNIFICANT CHANGE UP (ref 0.2–1.2)
BUN SERPL-MCNC: 19 MG/DL — SIGNIFICANT CHANGE UP (ref 7–23)
CALCIUM SERPL-MCNC: 9.1 MG/DL — SIGNIFICANT CHANGE UP (ref 8.4–10.5)
CHLORIDE SERPL-SCNC: 95 MMOL/L — LOW (ref 96–108)
CO2 SERPL-SCNC: 25 MMOL/L — SIGNIFICANT CHANGE UP (ref 22–31)
CREAT SERPL-MCNC: 1.16 MG/DL — SIGNIFICANT CHANGE UP (ref 0.5–1.3)
EOSINOPHIL # BLD AUTO: 0.29 K/UL — SIGNIFICANT CHANGE UP (ref 0–0.5)
EOSINOPHIL NFR BLD AUTO: 2.2 % — SIGNIFICANT CHANGE UP (ref 0–6)
GLUCOSE BLDC GLUCOMTR-MCNC: 155 MG/DL — HIGH (ref 70–99)
GLUCOSE BLDC GLUCOMTR-MCNC: 178 MG/DL — HIGH (ref 70–99)
GLUCOSE SERPL-MCNC: 140 MG/DL — HIGH (ref 70–99)
H PYLORI AB SER-ACNC: <5 UNITS — SIGNIFICANT CHANGE UP
HCT VFR BLD CALC: 28.9 % — LOW (ref 39–50)
HGB BLD-MCNC: 8.4 G/DL — LOW (ref 13–17)
HYPOCHROMIA BLD QL: SLIGHT — SIGNIFICANT CHANGE UP
IMM GRANULOCYTES NFR BLD AUTO: 0.6 % — SIGNIFICANT CHANGE UP (ref 0–1.5)
LYMPHOCYTES # BLD AUTO: 1.5 K/UL — SIGNIFICANT CHANGE UP (ref 1–3.3)
LYMPHOCYTES # BLD AUTO: 11.5 % — LOW (ref 13–44)
MAGNESIUM SERPL-MCNC: 2 MG/DL — SIGNIFICANT CHANGE UP (ref 1.6–2.6)
MANUAL SMEAR VERIFICATION: SIGNIFICANT CHANGE UP
MCHC RBC-ENTMCNC: 23.6 PG — LOW (ref 27–34)
MCHC RBC-ENTMCNC: 29.1 GM/DL — LOW (ref 32–36)
MCV RBC AUTO: 81.2 FL — SIGNIFICANT CHANGE UP (ref 80–100)
MICROCYTES BLD QL: SLIGHT — SIGNIFICANT CHANGE UP
MONOCYTES # BLD AUTO: 1.56 K/UL — HIGH (ref 0–0.9)
MONOCYTES NFR BLD AUTO: 12 % — SIGNIFICANT CHANGE UP (ref 2–14)
NEUTROPHILS # BLD AUTO: 9.54 K/UL — HIGH (ref 1.8–7.4)
NEUTROPHILS NFR BLD AUTO: 73.2 % — SIGNIFICANT CHANGE UP (ref 43–77)
PLAT MORPH BLD: NORMAL — SIGNIFICANT CHANGE UP
PLATELET # BLD AUTO: 249 K/UL — SIGNIFICANT CHANGE UP (ref 150–400)
POTASSIUM SERPL-MCNC: 3.8 MMOL/L — SIGNIFICANT CHANGE UP (ref 3.5–5.3)
POTASSIUM SERPL-SCNC: 3.8 MMOL/L — SIGNIFICANT CHANGE UP (ref 3.5–5.3)
PROT SERPL-MCNC: 6.8 G/DL — SIGNIFICANT CHANGE UP (ref 6–8.3)
RBC # BLD: 3.56 M/UL — LOW (ref 4.2–5.8)
RBC # FLD: 16.9 % — HIGH (ref 10.3–14.5)
RBC BLD AUTO: ABNORMAL
SODIUM SERPL-SCNC: 137 MMOL/L — SIGNIFICANT CHANGE UP (ref 135–145)
WBC # BLD: 13.03 K/UL — HIGH (ref 3.8–10.5)
WBC # FLD AUTO: 13.03 K/UL — HIGH (ref 3.8–10.5)

## 2019-05-14 PROCEDURE — 84295 ASSAY OF SERUM SODIUM: CPT

## 2019-05-14 PROCEDURE — 82962 GLUCOSE BLOOD TEST: CPT

## 2019-05-14 PROCEDURE — 82435 ASSAY OF BLOOD CHLORIDE: CPT

## 2019-05-14 PROCEDURE — 86923 COMPATIBILITY TEST ELECTRIC: CPT

## 2019-05-14 PROCEDURE — 85730 THROMBOPLASTIN TIME PARTIAL: CPT

## 2019-05-14 PROCEDURE — 36430 TRANSFUSION BLD/BLD COMPNT: CPT

## 2019-05-14 PROCEDURE — 83735 ASSAY OF MAGNESIUM: CPT

## 2019-05-14 PROCEDURE — 86901 BLOOD TYPING SEROLOGIC RH(D): CPT

## 2019-05-14 PROCEDURE — 82947 ASSAY GLUCOSE BLOOD QUANT: CPT

## 2019-05-14 PROCEDURE — 86850 RBC ANTIBODY SCREEN: CPT

## 2019-05-14 PROCEDURE — 93306 TTE W/DOPPLER COMPLETE: CPT

## 2019-05-14 PROCEDURE — 36415 COLL VENOUS BLD VENIPUNCTURE: CPT

## 2019-05-14 PROCEDURE — 80053 COMPREHEN METABOLIC PANEL: CPT

## 2019-05-14 PROCEDURE — 82330 ASSAY OF CALCIUM: CPT

## 2019-05-14 PROCEDURE — 82803 BLOOD GASES ANY COMBINATION: CPT

## 2019-05-14 PROCEDURE — 84484 ASSAY OF TROPONIN QUANT: CPT

## 2019-05-14 PROCEDURE — P9011: CPT

## 2019-05-14 PROCEDURE — 86900 BLOOD TYPING SEROLOGIC ABO: CPT

## 2019-05-14 PROCEDURE — 80048 BASIC METABOLIC PNL TOTAL CA: CPT

## 2019-05-14 PROCEDURE — 83605 ASSAY OF LACTIC ACID: CPT

## 2019-05-14 PROCEDURE — 85610 PROTHROMBIN TIME: CPT

## 2019-05-14 PROCEDURE — 86677 HELICOBACTER PYLORI ANTIBODY: CPT

## 2019-05-14 PROCEDURE — 82378 CARCINOEMBRYONIC ANTIGEN: CPT

## 2019-05-14 PROCEDURE — 88305 TISSUE EXAM BY PATHOLOGIST: CPT

## 2019-05-14 PROCEDURE — 99232 SBSQ HOSP IP/OBS MODERATE 35: CPT

## 2019-05-14 PROCEDURE — 85027 COMPLETE CBC AUTOMATED: CPT

## 2019-05-14 PROCEDURE — 99239 HOSP IP/OBS DSCHRG MGMT >30: CPT

## 2019-05-14 PROCEDURE — 82272 OCCULT BLD FECES 1-3 TESTS: CPT

## 2019-05-14 PROCEDURE — 85014 HEMATOCRIT: CPT

## 2019-05-14 PROCEDURE — 84132 ASSAY OF SERUM POTASSIUM: CPT

## 2019-05-14 PROCEDURE — 99285 EMERGENCY DEPT VISIT HI MDM: CPT

## 2019-05-14 PROCEDURE — 93005 ELECTROCARDIOGRAM TRACING: CPT

## 2019-05-14 PROCEDURE — 74177 CT ABD & PELVIS W/CONTRAST: CPT

## 2019-05-14 PROCEDURE — 71045 X-RAY EXAM CHEST 1 VIEW: CPT

## 2019-05-14 RX ORDER — GLIMEPIRIDE 1 MG
1 TABLET ORAL
Qty: 0 | Refills: 0 | DISCHARGE

## 2019-05-14 RX ORDER — METFORMIN HYDROCHLORIDE 850 MG/1
1 TABLET ORAL
Qty: 0 | Refills: 0 | DISCHARGE

## 2019-05-14 RX ORDER — ASPIRIN/CALCIUM CARB/MAGNESIUM 324 MG
1 TABLET ORAL
Qty: 0 | Refills: 0 | DISCHARGE

## 2019-05-14 RX ORDER — PANTOPRAZOLE SODIUM 20 MG/1
1 TABLET, DELAYED RELEASE ORAL
Qty: 30 | Refills: 0
Start: 2019-05-14 | End: 2019-05-28

## 2019-05-14 RX ADMIN — Medication 1: at 12:36

## 2019-05-14 RX ADMIN — Medication 40 MILLIGRAM(S): at 12:36

## 2019-05-14 RX ADMIN — Medication 1: at 08:40

## 2019-05-14 RX ADMIN — PANTOPRAZOLE SODIUM 40 MILLIGRAM(S): 20 TABLET, DELAYED RELEASE ORAL at 05:19

## 2019-05-14 NOTE — PROGRESS NOTE ADULT - NSHPATTENDINGPLANDISCUSS_GEN_ALL_CORE
ISRAEL Moreno, and GI fellow, and cardio fellow
ISRAEL Samano, and TG Woo, and GI fellow
Medicine ISRAEL Moreno
Medicine ISRAEL Moreno
Medicine TG Acosta
Medicine ISRAEL Guzman

## 2019-05-14 NOTE — PROGRESS NOTE ADULT - PROBLEM SELECTOR PLAN 5
-Cardiology consult recs appreciated for pre-procedure risk stratifications.   -continue to hold ASA and Apixaban for now, tentative plan as above.   -ECHO results: severe AS, will need evaluation for TAVR with structural heart once anemia and GIB addressed -Cardiology consult recs appreciated for pre-procedure risk stratifications.   -Plan for ASA and apixaban as above.   -ECHO results: severe AS, will need evaluation for TAVR with structural heart once anemia and GIB addressed

## 2019-05-14 NOTE — DIETITIAN INITIAL EVALUATION ADULT. - PROBLEM SELECTOR PLAN 5
would check 2d echo given decreased ET and weakness and sob could also be due to this  would reach out to dr. trejo in am

## 2019-05-14 NOTE — PROGRESS NOTE ADULT - PROBLEM SELECTOR PROBLEM 3
Diabetes mellitus type II, controlled, with no complications

## 2019-05-14 NOTE — DIETITIAN INITIAL EVALUATION ADULT. - OTHER INFO
seen for length of stay, admitted for I feel weak, consuming >75% of meals, denies nausea/vomit, denies difficulty chewing /swallow. he is missing some bottom teeth and he has a bridge on top. last BM last night, which continues to be loose secondary to colonoscopy prep. . NKFA. IBW +/- 10%=178pounds

## 2019-05-14 NOTE — PROGRESS NOTE ADULT - PROBLEM SELECTOR PLAN 7
RCRI score 2, Pt is medically optimized for colonoscopy   cleared by cardiology   avoid general anesthesia due to sever AS
RCRI score 2, Pt is medically optimized for colonoscopy   cleared by cardiology   avoid general anesthesia due to sever AS
RCRI score 2, Pt is medically optimized for colonoscopy today  cleared by cardiology   avoid general anesthesia due to sever AS
RCRI score 2, Pt is medically optimized for colonoscopy tomorrow  cleared by cardiology today  avoid general anesthesia due to sever AS
-Cardio recs appreciated; patient s/p EGD/colonoscopy on 5/13.
-Cardio recs appreciated; patient s/p EGD/colonoscopy on 5/13.

## 2019-05-14 NOTE — PROGRESS NOTE ADULT - SUBJECTIVE AND OBJECTIVE BOX
Patient is a 64y old  Male who presents with a chief complaint of I feel weak (14 May 2019 11:56)        SUBJECTIVE / OVERNIGHT EVENTS: Patient reports still having some loose stools from the colonoscopy prep, but no melena or bloody stool. He denies pain or SOB.       MEDICATIONS  (STANDING):  dextrose 5%. 1000 milliLiter(s) (50 mL/Hr) IV Continuous <Continuous>  dextrose 50% Injectable 12.5 Gram(s) IV Push once  dextrose 50% Injectable 25 Gram(s) IV Push once  dextrose 50% Injectable 25 Gram(s) IV Push once  insulin lispro (HumaLOG) corrective regimen sliding scale   SubCutaneous three times a day before meals  insulin lispro (HumaLOG) corrective regimen sliding scale   SubCutaneous at bedtime  pantoprazole    Tablet 40 milliGRAM(s) Oral two times a day  PARoxetine 40 milliGRAM(s) Oral daily    MEDICATIONS  (PRN):  dextrose 40% Gel 15 Gram(s) Oral once PRN Blood Glucose LESS THAN 70 milliGRAM(s)/deciliter  glucagon  Injectable 1 milliGRAM(s) IntraMuscular once PRN Glucose LESS THAN 70 milligrams/deciliter      Vital Signs Last 24 Hrs  T(C): 36.9 (14 May 2019 09:15), Max: 36.9 (14 May 2019 09:15)  T(F): 98.4 (14 May 2019 09:15), Max: 98.4 (14 May 2019 09:15)  HR: 60 (14 May 2019 09:15) (60 - 85)  BP: 119/56 (14 May 2019 09:15) (119/56 - 151/74)  BP(mean): --  RR: 18 (14 May 2019 09:15) (18 - 18)  SpO2: 96% (14 May 2019 09:15) (96% - 98%)  CAPILLARY BLOOD GLUCOSE      POCT Blood Glucose.: 178 mg/dL (14 May 2019 12:31)  POCT Blood Glucose.: 155 mg/dL (14 May 2019 08:39)  POCT Blood Glucose.: 270 mg/dL (13 May 2019 22:42)  POCT Blood Glucose.: 173 mg/dL (13 May 2019 17:54)    I&O's Summary    13 May 2019 07:01  -  14 May 2019 07:00  --------------------------------------------------------  IN: 480 mL / OUT: 0 mL / NET: 480 mL    14 May 2019 07:01  -  14 May 2019 12:59  --------------------------------------------------------  IN: 600 mL / OUT: 0 mL / NET: 600 mL          PHYSICAL EXAM:   GENERAL: NAD, well-developed, pale  HEAD:  Atraumatic, Normocephalic  EYES: Conjunctiva and sclera mild pallor.   NECK: Supple  CHEST/LUNG: Clear to auscultation bilaterally; No wheeze or crackles.   HEART: S1S2 normal. Regular rate and rhythm; systolic murmur present.   ABDOMEN: Soft, Nontender, Nondistended; Bowel sounds present  EXTREMITIES: Trace LE edema  PSYCH/Neuro: AAOx3. Non-focal.   SKIN: No rashes or lesions      LABS:                        8.4    13.03 )-----------( 249      ( 14 May 2019 08:51 )             28.9     05-14    137  |  95<L>  |  19  ----------------------------<  140<H>  3.8   |  25  |  1.16    Ca    9.1      14 May 2019 07:02  Mg     2.0     05-14    TPro  6.8  /  Alb  3.9  /  TBili  0.6  /  DBili  x   /  AST  53<H>  /  ALT  36  /  AlkPhos  88  05-14          < from: CT Abdomen and Pelvis w/ Oral Cont and w/ IV Cont (05.13.19 @ 22:36) >  IMPRESSION:     Mild wall thickening of the cecum and ascending colon, possibly   corresponding to the ulcer on colonoscopy. No regional or distant   lymphadenopathy.    Nonspecific heterogeneity of the visualized liver without focal lesion.    < end of copied text >        RADIOLOGY & ADDITIONAL TESTS:    Imaging Personally Reviewed: CT abd/pelvis report.   Consultant(s) Notes Reviewed:  GI  Care Discussed with Consultants/Other Providers: GI fellow

## 2019-05-14 NOTE — DISCHARGE NOTE PROVIDER - CARE PROVIDER_API CALL
Volodymyr Ortiz)  Gastroenterology; Internal Medicine  [address] [place] [address]  Phone: [phone]  Fax: [phone]  Follow Up Time:

## 2019-05-14 NOTE — PROGRESS NOTE ADULT - REASON FOR ADMISSION
I feel weak

## 2019-05-14 NOTE — PROGRESS NOTE ADULT - PROBLEM SELECTOR PLAN 1
-Due to acute GI bleed s/p 1.5 units of PRBC on admission, H/H so far pretty stable/improved.   -Poor visualization during colonoscopy 5/10, repeat colonoscopy 5/13 showed ulcer in cecum/ascending colon s/p biopsy. Concern for ?malignancy vs other. -CEA within normal limits and CT abd/pelvis C+ mild wall thickening of cecum/ascending colon.    -Received 2 additional 1/2 units PRBC on 5/12, prior to colonoscopy.   -Continue to hold ASA and AC, pending further work up. Discussed with GI, likely can resume within 72 hours of biopsy. Cards says to resume based on GI recs, but AC more important for Afib and ASA less important given patient only has non-obstructive CAD.   -Transfuse if Hgb <7, in 1/2 units, in view of severe AS, to avoid flash pulmonary edema.  -EGD on 5/13 showed a non-bleeding gastric ulcer. C/w PPI BID. -H. pylori Ab sent. -Due to acute GI bleed s/p 1.5 units of PRBC on admission, H/H so far pretty stable/improved.   -Poor visualization during colonoscopy 5/10, repeat colonoscopy 5/13 showed ulcer in cecum/ascending colon s/p biopsy. Concern for ?malignancy vs other. -CEA within normal limits and CT abd/pelvis C+ showed mild wall thickening of cecum/ascending colon without any lymphadenopathy/liver lesions.    -Colonic ulcer biopsy consistent with ulcer and no signs of malignancy/dysplasia. -Patient will f/u with GI for repeat colonoscopy in 1 month to re-eval.   -Received 2 additional 1/2 units PRBC on 5/12, prior to colonoscopy.   -Will resume AC 72 hours after biopsy on 5/16, but will hold ASA for now. Discussed with GI, since no active bleeding seen, no contraindications to AC, however would consider avoiding if possible. Cards says to resume based on GI recs, but AC more important for Afib and ASA less important given patient only has non-obstructive CAD. -Will therefore try AC for Afib/stroke protection in 72 hours only without ASA, since patient only has non-obstructive CAD.   -Transfuse if Hgb <7, in 1/2 units, in view of severe AS, to avoid flash pulmonary edema.  -EGD on 5/13 showed a non-bleeding gastric ulcer. C/w PPI BID. -H. pylori Ab sent - testing. Consider H. pylori treatment outpatient if positive.

## 2019-05-14 NOTE — DIETITIAN INITIAL EVALUATION ADULT. - ORAL INTAKE PTA
good/fiber one for breakfast, pizza or hamburger for lunch, pasta, sausage with meat sauce for dinner. once in a while he will have ice cream. he live with his wife. they tend purchase already prepared foods.

## 2019-05-14 NOTE — PROGRESS NOTE ADULT - PROBLEM SELECTOR PLAN 4
-Hold home ACEi for now, will hold for now since giving IV contrast with CT abd/pelvis. -Holding home ACEi for now, was holding for now since gave IV contrast with CT abd/pelvis. -Can resume upon DC, since creatinine stable.

## 2019-05-14 NOTE — PROGRESS NOTE ADULT - PROBLEM SELECTOR PROBLEM 7
Pre-op examination

## 2019-05-14 NOTE — PROGRESS NOTE ADULT - PROBLEM SELECTOR PLAN 6
-continue to hold ASA and Apixaban for now, tentative plan as above. -Plan for ASA and apixaban as above.

## 2019-05-14 NOTE — PROGRESS NOTE ADULT - PROBLEM SELECTOR PLAN 8
-Possibly hypovolemic hyponatremia.   -Pt received lasix 20 mg IV x 1 during transfusions on 5/13 due to bibasilar crackles 2/2 severe AS   -Monitor BMP.    9. Prophylactic measure: -SCD's for DVT PPx in view of recent GI bleed. -Possibly was hypovolemic hyponatremia. Today resolved.   -Pt received lasix 20 mg IV x 1 during transfusions on 5/13 due to bibasilar crackles 2/2 severe AS.   -Monitor BMP.    9. Prophylactic measure: -SCD's for DVT PPx in view of recent GI bleed.    10. Dispo: -Patient stable for DC to home today with close outpatient f/u with GI, PMD, and cardiology. -36 minutes spent on the discharge process.

## 2019-05-14 NOTE — PROGRESS NOTE ADULT - PROBLEM SELECTOR PLAN 3
-Hold oral meds.   -C/w JHOAN QAC/HS.   -Carbohydrate consistent DASH diet. -Hold oral meds, can resume upon DC.   -C/w JHOAN QAC/HS.   -Carbohydrate consistent DASH diet.

## 2019-05-14 NOTE — PROGRESS NOTE ADULT - SUBJECTIVE AND OBJECTIVE BOX
Chief Complaint:  Patient is a 64y old  Male who presents with a chief complaint of I feel weak (13 May 2019 11:38)      Interval Events: Ct yesterday showed mild wall thickening on cecum and ascending colon, corresponding to ulcer, no regional/distant LAD.   Pt s/p EGD/colonoscopy yesterday.   ROS: All 12 point system except listed above were otherwise negative.    Allergies:  No Known Drug Allergies  pollen (Rhinitis; Rhinorrhea; Eye Irritation)        Hospital Medications:  dextrose 40% Gel 15 Gram(s) Oral once PRN  dextrose 5%. 1000 milliLiter(s) IV Continuous <Continuous>  dextrose 50% Injectable 12.5 Gram(s) IV Push once  dextrose 50% Injectable 25 Gram(s) IV Push once  dextrose 50% Injectable 25 Gram(s) IV Push once  glucagon  Injectable 1 milliGRAM(s) IntraMuscular once PRN  insulin lispro (HumaLOG) corrective regimen sliding scale   SubCutaneous three times a day before meals  insulin lispro (HumaLOG) corrective regimen sliding scale   SubCutaneous at bedtime  pantoprazole    Tablet 40 milliGRAM(s) Oral two times a day  PARoxetine 40 milliGRAM(s) Oral daily      PMHX/PSHX:  Arrhythmia as indication for cardiac pacemaker replacement  HLD (hyperlipidemia)  Depression  Diabetes mellitus type 2 in obese  HTN (hypertension)  Diabetes mellitus type II, controlled, with no complications  Diabetes mellitus type II  Dyslipidemia  HTN (hypertension)  No significant past surgical history      Family history:  No pertinent family history in first degree relatives    There is no family history of peptic ulcer disease, gastric cancer, colon polyps, colon cancer, celiac disease, biliary, hepatic, or pancreatic disease.  None of the female relatives have breast, uterine, or ovarian cancer.     PHYSICAL EXAM:   Vital Signs:  Vital Signs Last 24 Hrs  T(C): 36.9 (14 May 2019 09:15), Max: 36.9 (14 May 2019 09:15)  T(F): 98.4 (14 May 2019 09:15), Max: 98.4 (14 May 2019 09:15)  HR: 60 (14 May 2019 09:15) (60 - 85)  BP: 119/56 (14 May 2019 09:15) (119/56 - 151/74)  BP(mean): --  RR: 18 (14 May 2019 09:15) (18 - 18)  SpO2: 96% (14 May 2019 09:15) (96% - 98%)  Daily     Daily Weight in k.1 (14 May 2019 09:08)    PHYSICAL EXAM:  GENERAL: NAD, well-developed  HEAD:  Atraumatic, Normocephalic  EYES: EOMI, PERRLA, conjunctiva and sclera anicteric  NECK: Supple, No JVD  CHEST/LUNG: Clear to auscultation bilaterally; No wheeze  HEART: Regular rate and rhythm; + systolic murmur, rubs, or gallops  ABDOMEN: Soft, Nontender, Nondistended; Bowel sounds present, no hepatosplenomegaly, no rebound or guarding        LABS:                        8.4    13.03 )-----------( 249      ( 14 May 2019 08:51 )             28.9     Mean Cell Volume: 81.2 fl (19 @ 08:51)        137  |  95<L>  |  19  ----------------------------<  140<H>  3.8   |  25  |  1.16    Ca    9.1      14 May 2019 07:02  Mg     2.0         TPro  6.8  /  Alb  3.9  /  TBili  0.6  /  DBili  x   /  AST  53<H>  /  ALT  36  /  AlkPhos  88      LIVER FUNCTIONS - ( 14 May 2019 07:02 )  Alb: 3.9 g/dL / Pro: 6.8 g/dL / ALK PHOS: 88 U/L / ALT: 36 U/L / AST: 53 U/L / GGT: x                                       8.4    13.03 )-----------( 249      ( 14 May 2019 08:51 )             28.9                         8.1    10.65 )-----------( 250      ( 13 May 2019 09:10 )             27.3                         7.3    10.71 )-----------( 245      ( 12 May 2019 09:35 )             24.7                         7.7    11.3  )-----------( 230      ( 11 May 2019 22:27 )             23.6                         7.6    11.47 )-----------( 275      ( 11 May 2019 10:42 )             25.1     Imaging:      < from: Upper Endoscopy (19 @ 08:13) >  Findings:       The examined esophagus was normal.       TheZ-line was regular and was found 36 cm from the incisors.       One small, non-bleeding superficial gastric ulcer of moderate severity with a clean ulcer        base (Yong Class III) was found in the prepyloric region of the stomach.       Localized moderate inflammation characterized by erosions and erythema was found in the        prepyloric region of the stomach.       The exam of the stomach was otherwise normal.       The duodenal bulb and 2nd part of the duodenum were normal.                                                                                             Impression:          - Non-bleeding gastric ulcer with a clean ulcer base (Yong Class III).                       - Gastritis.                       - No active bleeding seen  Recommendation:      - [facility].                       - Resume regular diet.                       - Use Protonix (pantoprazole) 40 mg PO BID.                       - Check serum H. pylori antibody, consider empiric treatment if positive.                       - Proceed to colonoscopy                                                                                                            < end of copied text >      < from: Colonoscopy (19 @ 08:02) >                                                                                                      Findings:       The perianal and digital rectal examinations were normal. Pertinent negatives include no        palpable rectal lesions.       A single (solitary) 2 cm non-circumferential ulcer was found in the cecum. The ulcer appears        irregular with firm edges. No bleeding was present. No stigmata of recent bleeding were seen.        Multiple biopsies were taken with a cold forceps for histology.       The exam was otherwise normal throughout the examined colon, however visualization was        partially limited secondary to inadequate prep. Small polyps/masses may be missed.       Internal hemorrhoids were found during retroflexion. The hemorrhoids were medium-sized.                    Impression:          - Preparation of the colon was suboptimal                       - A single (solitary) ulcer in the cecum/ascending colon was found, likely                        source of bleeding. Biopsied multiple times to rule out malignancy                       - Internal hemorrhoids.  Recommendation:      - Return [facility].                       - Resume regular diet.                       - Repeat colonoscopy in 1 month to check healing.                       - Consider CT A/P to evaluate for malignancy.                       - Check CEA level                       - Await pathology results.              Attending Participation:       I personally performed the entire procedure.                                                                                                  < end of copied text >

## 2019-05-14 NOTE — DIETITIAN INITIAL EVALUATION ADULT. - PERTINENT MEDS FT
dextrose 40% Gel 15 PRN  dextrose 5%. 1000  dextrose 50% Injectable 12.5  dextrose 50% Injectable 25  dextrose 50% Injectable 25  glucagon  Injectable 1 PRN  insulin lispro (HumaLOG) corrective regimen sliding scale   insulin lispro (HumaLOG) corrective regimen sliding scale   pantoprazole    Tablet 40  PARoxetine 40

## 2019-05-14 NOTE — DISCHARGE NOTE PROVIDER - NSDCCPCAREPLAN_GEN_ALL_CORE_FT
PRINCIPAL DISCHARGE DIAGNOSIS  Diagnosis: GIB (gastrointestinal bleeding)  Assessment and Plan of Treatment: resolved s/p Blood transfusion

## 2019-05-14 NOTE — PROGRESS NOTE ADULT - ASSESSMENT
Impression:  1) Melena with anemia- Status post EGD revealing gastric ulcer (clean based), also colonoscopy revealing cecal ulcer with irregular and firm edges, biopsies pending. CT for malignancy revealed no distant LAD.     2) Severe AS  3) Afib - Off AC since last admission    Recommendations:  -Follow up pathology results  -Diet as tolerated  -Continue with PPI PO BID  -Check serum H. pylori antibody, consider empiric treatment if positive  -Repeat colonoscopy in 1 month to check healing

## 2019-05-14 NOTE — DISCHARGE NOTE PROVIDER - HOSPITAL COURSE
64 year old male with a PMH of GERD, DM-2, HTN, anxiety, Atrial flutter (on apixaban), severe aortic stenosis, third degree av block s/p PPM; presented with weakness found to have decreased Hgb concern for acute blood loss anemia.              · Anemia due to blood loss.  Plan: -Due to acute GI bleed s/p 1.5 units of PRBC on admission, H/H so far pretty stable.     -Poor visualization during colonoscopy 5/10, repeat colonoscopy 5/13 showed ulcer in cecum/ascending colon s/p biopsy. Concern for ?malignancy. Will get CEA and CT abd/pelvis C+ to further eval.      -Received 2 additional 1/2 units PRBC on 5/12, prior to colonoscopy.     -Continue to hold ASA and AC, pending further work up. Discussed with GI, likely can resume within 72 hours of biopsy. Cards says to resume based on GI recs, but AC more important for Afib and ASA less important given patient only has non-obstructive CAD.     -    -EGD on 5/13 showed a non-bleeding gastric ulcer. C/w PPI BID. -H. pylori Ab sent.     pt need to hold ASA x 72 hrs can start on 5/17                     ·  Problem: HTN (hypertension).  Plan: -Hold home ACEi for now, will hold for now since giving IV contrast with CT abd/pelvis. hold lisinopril x 48 hr            ·Severe aortic stenosis.  Plan: -Cardiology consult recs appreciated for pre-procedure risk stratifications.     -continue to hold ASA and Apixaban for now, tentative plan as above.     -ECHO results: severe AS, will need evaluation for TAVR with structural heart once anemia and GIB addressed.          :    : Atrial flutter. Plan: -continue to hold ASA x 72 hrs and Apixaban  up to  may resume on 5/16             pt need f/u with Dr obregon  in one month     He require another colonoscopy r/o any malignancy         pt is clear for D/C from DR Andujar and CONNIE Andujar MD    Division of Hospital Medicine 64 year old male with a PMH of GERD, DM-2, HTN, anxiety, Atrial flutter (on apixaban), severe aortic stenosis, third degree av block s/p PPM; presented with weakness found to have decreased Hgb concern for acute blood loss anemia.              · Anemia due to blood loss.  Plan: -Due to acute GI bleed s/p 1.5 units of PRBC on admission, H/H so far pretty stable.     -Poor visualization during colonoscopy 5/10, repeat colonoscopy 5/13 showed ulcer in cecum/ascending colon s/p biopsy. Concern for ?malignancy. Will get CEA and CT abd/pelvis C+ to further eval.      -Received 2 additional 1/2 units PRBC on 5/12, prior to colonoscopy.     -Continue to hold ASA and AC, pending further work up. Discussed with GI, likely can resume within 72 hours of biopsy. Cards says to resume based on GI recs, but AC more important for Afib and ASA less important given patient only has non-obstructive CAD.     -    -EGD on 5/13 showed a non-bleeding gastric ulcer. C/w PPI BID. -H. pylori Ab sent.     pt need to hold ASA x 72 hrs can start on 5/17                     ·  Problem: HTN (hypertension).  Plan: -Hold home ACEi for now, will hold for now since giving IV contrast with CT abd/pelvis. hold lisinopril x 48 hr            ·Severe aortic stenosis.  Plan: -Cardiology consult recs appreciated for pre-procedure risk stratifications.     -continue to hold ASA and Apixaban for now, tentative plan as above.     -ECHO results: severe AS, will need evaluation for TAVR with structural heart once anemia and GIB addressed.          :    : Atrial flutter. Plan: -continue to hold ASA x 72 hrs and Apixaban  may resume on 5/16             pt need f/u with Dr obregon  in one month     He require another colonoscopy r/o any malignancy         pt is clear for D/C from DR Andujar and CONNIE Andujar MD    Division of Hospital Medicine 64 year old male with a PMH of GERD, DM-2, HTN, anxiety, Atrial flutter (on apixaban), severe aortic stenosis, third degree av block s/p PPM; presented with weakness found to have decreased Hgb concern for acute blood loss anemia.              · Anemia due to blood loss.  Plan: -Due to acute GI bleed s/p 1.5 units of PRBC on admission, H/H so far pretty stable.     -Poor visualization during colonoscopy 5/10, repeat colonoscopy 5/13 showed ulcer in cecum/ascending colon s/p biopsy. Concern for ?malignancy. Will get CEA and CT abd/pelvis C+ to further eval.      -Received 2 additional 1/2 units PRBC on 5/12, prior to colonoscopy.     -Continue to hold ASA and AC, pending further work up. Discussed with GI, likely can resume within 72 hours of biopsy. Cards says to resume based on GI recs, but AC more important for Afib and ASA less important given patient only has non-obstructive CAD.     -    -EGD on 5/13 showed a non-bleeding gastric ulcer. C/w PPI BID. -H. pylori Ab sent.                          ·  Problem: HTN (hypertension).  Plan: -Hold home ACEi for now, will hold for now since giving IV contrast with CT abd/pelvis. hold lisinopril x 48 hr            ·Severe aortic stenosis.  Plan: -Cardiology consult recs appreciated for pre-procedure risk stratifications.     - restart eliquis on 5/16    -ECHO results: severe AS, will need evaluation for TAVR with structural heart once anemia and GIB addressed.          :    : Atrial flutter. Plan:  Dis continue  ASA    Apixaban  may resume on 5/16             pt need f/u with Dr obregon  in one month     He require another colonoscopy r/o any malignancy         pt is clear for D/C from DR Andujar and CONNIE Andujar MD    Division of Hospital Medicine 64 year old male with a PMH of GERD, DM-2, HTN, anxiety, Atrial flutter (on apixaban), severe aortic stenosis, third degree av block s/p PPM; presented with weakness found to have decreased Hgb concern for acute blood loss anemia.              · Anemia due to blood loss.  Plan: -Due to acute GI bleed s/p 1.5 units of PRBC on admission, H/H so far pretty stable.     -Poor visualization during colonoscopy 5/10, repeat colonoscopy 5/13 showed ulcer in cecum/ascending colon s/p biopsy. Concern for ?malignancy. Will get CEA and CT abd/pelvis C+ to further eval.      -Received 2 additional 1/2 units PRBC on 5/12, prior to colonoscopy.     -Continue to hold ASA and AC, pending further work up. Discussed with GI, likely can resume within 72 hours of biopsy. Cards says to resume based on GI recs, but AC more important for Afib and ASA less important given patient only has non-obstructive CAD.     -    -EGD on 5/13 showed a non-bleeding gastric ulcer. C/w PPI BID. -H. pylori Ab sent.                          ·  : HTN (hypertension).  Plan: -Hold home ACEi for now, will hold for now since giving IV contrast with CT abd/pelvis. hold lisinopril x 48 hr            ·Severe aortic stenosis.  Plan: -Cardiology consult recs appreciated for pre-procedure risk stratifications.     - restart eliquis on 5/16    -ECHO results: severe AS, will need evaluation for TAVR with structural heart once anemia and GIB addressed.          :    : Atrial flutter. Plan:  Dis continue  ASA    Apixaban  may resume on 5/16             pt need f/u with Dr obregon  in one month     He require another colonoscopy r/o any malignancy         pt is clear for D/C from DR Andujar and CONNIE Andujar MD    Division of Hospital Medicine

## 2019-05-14 NOTE — PROGRESS NOTE ADULT - ATTENDING COMMENTS
Andrei Andujar MD  Division of [facility]  Cell: [phone]  Pager: [phone]  Office: [phone]
Andrei Andujar MD  Division of [facility]  Cell: [phone]  Pager: [phone]  Office: [phone]
Patient seen and examined; agree with Cardiology Fellow assessment and plan.  --Feeling well, no new complaints.  --Awaiting colonoscopy to ensure proper visualization.  --Eventual AS work-up when GI issues and bleed resolve.  --Appears to have tolerated recent GI procedures well.
unclear etiology for transient confusion overnight, d/d delirium vs dehydration, no electrolytes abnormalities h/h stable  now resolved, monitor closely

## 2019-05-15 ENCOUNTER — INPATIENT (INPATIENT)
Facility: HOSPITAL | Age: 64
LOS: 0 days | End: 2019-05-16
Attending: HOSPITALIST | Admitting: HOSPITALIST
Payer: MEDICARE

## 2019-05-15 VITALS
HEART RATE: 59 BPM | TEMPERATURE: 98 F | OXYGEN SATURATION: 97 % | SYSTOLIC BLOOD PRESSURE: 141 MMHG | RESPIRATION RATE: 16 BRPM | DIASTOLIC BLOOD PRESSURE: 47 MMHG

## 2019-05-15 VITALS
OXYGEN SATURATION: 95 % | HEART RATE: 66 BPM | TEMPERATURE: 101 F | DIASTOLIC BLOOD PRESSURE: 72 MMHG | SYSTOLIC BLOOD PRESSURE: 141 MMHG | RESPIRATION RATE: 21 BRPM

## 2019-05-15 DIAGNOSIS — K72.90 HEPATIC FAILURE, UNSPECIFIED WITHOUT COMA: ICD-10-CM

## 2019-05-15 DIAGNOSIS — I10 ESSENTIAL (PRIMARY) HYPERTENSION: ICD-10-CM

## 2019-05-15 DIAGNOSIS — K72.00 ACUTE AND SUBACUTE HEPATIC FAILURE WITHOUT COMA: ICD-10-CM

## 2019-05-15 DIAGNOSIS — I48.92 UNSPECIFIED ATRIAL FLUTTER: ICD-10-CM

## 2019-05-15 DIAGNOSIS — E11.9 TYPE 2 DIABETES MELLITUS WITHOUT COMPLICATIONS: ICD-10-CM

## 2019-05-15 DIAGNOSIS — E78.5 HYPERLIPIDEMIA, UNSPECIFIED: ICD-10-CM

## 2019-05-15 DIAGNOSIS — A41.9 SEPSIS, UNSPECIFIED ORGANISM: ICD-10-CM

## 2019-05-15 DIAGNOSIS — R74.0 NONSPECIFIC ELEVATION OF LEVELS OF TRANSAMINASE AND LACTIC ACID DEHYDROGENASE [LDH]: ICD-10-CM

## 2019-05-15 DIAGNOSIS — G93.41 METABOLIC ENCEPHALOPATHY: ICD-10-CM

## 2019-05-15 LAB
ALBUMIN SERPL ELPH-MCNC: 3.6 G/DL — SIGNIFICANT CHANGE UP (ref 3.3–5)
ALBUMIN SERPL ELPH-MCNC: 3.7 G/DL — SIGNIFICANT CHANGE UP (ref 3.3–5)
ALP SERPL-CCNC: 168 U/L — HIGH (ref 40–120)
ALP SERPL-CCNC: 183 U/L — HIGH (ref 40–120)
ALT FLD-CCNC: 1398 U/L — HIGH (ref 4–41)
ALT FLD-CCNC: 3567 U/L — HIGH (ref 4–41)
AMMONIA BLD-MCNC: 61 UMOL/L — HIGH (ref 11–55)
ANION GAP SERPL CALC-SCNC: 17 MMO/L — HIGH (ref 7–14)
ANION GAP SERPL CALC-SCNC: 19 MMO/L — HIGH (ref 7–14)
ANISOCYTOSIS BLD QL: SLIGHT — SIGNIFICANT CHANGE UP
ANISOCYTOSIS BLD QL: SLIGHT — SIGNIFICANT CHANGE UP
APAP SERPL-MCNC: < 15 UG/ML — LOW (ref 15–25)
APPEARANCE UR: CLEAR — SIGNIFICANT CHANGE UP
APTT BLD: 27.6 SEC — SIGNIFICANT CHANGE UP (ref 27.5–36.3)
APTT BLD: 31.2 SEC — SIGNIFICANT CHANGE UP (ref 27.5–36.3)
AST SERPL-CCNC: 2047 U/L — HIGH (ref 4–40)
AST SERPL-CCNC: 5811 U/L — HIGH (ref 4–40)
B PERT DNA SPEC QL NAA+PROBE: NOT DETECTED — SIGNIFICANT CHANGE UP
BACTERIA # UR AUTO: NEGATIVE — SIGNIFICANT CHANGE UP
BASE EXCESS BLDV CALC-SCNC: -2 MMOL/L — SIGNIFICANT CHANGE UP
BASE EXCESS BLDV CALC-SCNC: -4.1 MMOL/L — SIGNIFICANT CHANGE UP
BASE EXCESS BLDV CALC-SCNC: 0 MMOL/L — SIGNIFICANT CHANGE UP
BASOPHILS # BLD AUTO: 0.04 K/UL — SIGNIFICANT CHANGE UP (ref 0–0.2)
BASOPHILS # BLD AUTO: 0.06 K/UL — SIGNIFICANT CHANGE UP (ref 0–0.2)
BASOPHILS NFR BLD AUTO: 0.2 % — SIGNIFICANT CHANGE UP (ref 0–2)
BASOPHILS NFR BLD AUTO: 0.2 % — SIGNIFICANT CHANGE UP (ref 0–2)
BASOPHILS NFR SPEC: 0 % — SIGNIFICANT CHANGE UP (ref 0–2)
BASOPHILS NFR SPEC: 0 % — SIGNIFICANT CHANGE UP (ref 0–2)
BILIRUB SERPL-MCNC: 1.5 MG/DL — HIGH (ref 0.2–1.2)
BILIRUB SERPL-MCNC: 2.4 MG/DL — HIGH (ref 0.2–1.2)
BILIRUB UR-MCNC: NEGATIVE — SIGNIFICANT CHANGE UP
BLASTS # FLD: 0 % — SIGNIFICANT CHANGE UP (ref 0–0)
BLASTS # FLD: 0 % — SIGNIFICANT CHANGE UP (ref 0–0)
BLD GP AB SCN SERPL QL: NEGATIVE — SIGNIFICANT CHANGE UP
BLOOD GAS VENOUS - CREATININE: 1.2 MG/DL — SIGNIFICANT CHANGE UP (ref 0.5–1.3)
BLOOD GAS VENOUS - CREATININE: 1.47 MG/DL — HIGH (ref 0.5–1.3)
BLOOD GAS VENOUS - CREATININE: 1.59 MG/DL — HIGH (ref 0.5–1.3)
BLOOD GAS VENOUS - FIO2: 21 — SIGNIFICANT CHANGE UP
BLOOD UR QL VISUAL: SIGNIFICANT CHANGE UP
BUN SERPL-MCNC: 29 MG/DL — HIGH (ref 7–23)
BUN SERPL-MCNC: 30 MG/DL — HIGH (ref 7–23)
C PNEUM DNA SPEC QL NAA+PROBE: NOT DETECTED — SIGNIFICANT CHANGE UP
CALCIUM SERPL-MCNC: 8.8 MG/DL — SIGNIFICANT CHANGE UP (ref 8.4–10.5)
CALCIUM SERPL-MCNC: 9 MG/DL — SIGNIFICANT CHANGE UP (ref 8.4–10.5)
CHLORIDE BLDV-SCNC: 101 MMOL/L — SIGNIFICANT CHANGE UP (ref 96–108)
CHLORIDE BLDV-SCNC: 104 MMOL/L — SIGNIFICANT CHANGE UP (ref 96–108)
CHLORIDE BLDV-SCNC: 107 MMOL/L — SIGNIFICANT CHANGE UP (ref 96–108)
CHLORIDE SERPL-SCNC: 101 MMOL/L — SIGNIFICANT CHANGE UP (ref 98–107)
CHLORIDE SERPL-SCNC: 103 MMOL/L — SIGNIFICANT CHANGE UP (ref 98–107)
CO2 SERPL-SCNC: 19 MMOL/L — LOW (ref 22–31)
CO2 SERPL-SCNC: 22 MMOL/L — SIGNIFICANT CHANGE UP (ref 22–31)
COLOR SPEC: YELLOW — SIGNIFICANT CHANGE UP
CREAT SERPL-MCNC: 1.36 MG/DL — HIGH (ref 0.5–1.3)
CREAT SERPL-MCNC: 1.64 MG/DL — HIGH (ref 0.5–1.3)
EOSINOPHIL # BLD AUTO: 0 K/UL — SIGNIFICANT CHANGE UP (ref 0–0.5)
EOSINOPHIL # BLD AUTO: 0.01 K/UL — SIGNIFICANT CHANGE UP (ref 0–0.5)
EOSINOPHIL NFR BLD AUTO: 0 % — SIGNIFICANT CHANGE UP (ref 0–6)
EOSINOPHIL NFR BLD AUTO: 0.1 % — SIGNIFICANT CHANGE UP (ref 0–6)
EOSINOPHIL NFR FLD: 0 % — SIGNIFICANT CHANGE UP (ref 0–6)
EOSINOPHIL NFR FLD: 0 % — SIGNIFICANT CHANGE UP (ref 0–6)
ETHANOL BLD-MCNC: < 10 MG/DL — SIGNIFICANT CHANGE UP
FLUAV H1 2009 PAND RNA SPEC QL NAA+PROBE: NOT DETECTED — SIGNIFICANT CHANGE UP
FLUAV H1 RNA SPEC QL NAA+PROBE: NOT DETECTED — SIGNIFICANT CHANGE UP
FLUAV H3 RNA SPEC QL NAA+PROBE: NOT DETECTED — SIGNIFICANT CHANGE UP
FLUAV SUBTYP SPEC NAA+PROBE: NOT DETECTED — SIGNIFICANT CHANGE UP
FLUBV RNA SPEC QL NAA+PROBE: NOT DETECTED — SIGNIFICANT CHANGE UP
GAS PNL BLDV: 130 MMOL/L — LOW (ref 136–146)
GAS PNL BLDV: 135 MMOL/L — LOW (ref 136–146)
GAS PNL BLDV: 137 MMOL/L — SIGNIFICANT CHANGE UP (ref 136–146)
GIANT PLATELETS BLD QL SMEAR: PRESENT — SIGNIFICANT CHANGE UP
GIANT PLATELETS BLD QL SMEAR: PRESENT — SIGNIFICANT CHANGE UP
GLUCOSE BLDV-MCNC: 117 MG/DL — HIGH (ref 70–99)
GLUCOSE BLDV-MCNC: 160 MG/DL — HIGH (ref 70–99)
GLUCOSE BLDV-MCNC: 252 MG/DL — HIGH (ref 70–99)
GLUCOSE SERPL-MCNC: 118 MG/DL — HIGH (ref 70–99)
GLUCOSE SERPL-MCNC: 250 MG/DL — HIGH (ref 70–99)
GLUCOSE UR-MCNC: 150 — HIGH
HADV DNA SPEC QL NAA+PROBE: NOT DETECTED — SIGNIFICANT CHANGE UP
HCO3 BLDV-SCNC: 20 MMOL/L — SIGNIFICANT CHANGE UP (ref 20–27)
HCO3 BLDV-SCNC: 23 MMOL/L — SIGNIFICANT CHANGE UP (ref 20–27)
HCO3 BLDV-SCNC: 23 MMOL/L — SIGNIFICANT CHANGE UP (ref 20–27)
HCOV PNL SPEC NAA+PROBE: SIGNIFICANT CHANGE UP
HCT VFR BLD CALC: 27.5 % — LOW (ref 39–50)
HCT VFR BLD CALC: 29.1 % — LOW (ref 39–50)
HCT VFR BLDV CALC: 25.5 % — LOW (ref 39–51)
HCT VFR BLDV CALC: 25.6 % — LOW (ref 39–51)
HCT VFR BLDV CALC: 26.9 % — LOW (ref 39–51)
HGB BLD-MCNC: 8 G/DL — LOW (ref 13–17)
HGB BLD-MCNC: 8.3 G/DL — LOW (ref 13–17)
HGB BLDV-MCNC: 8.2 G/DL — LOW (ref 13–17)
HGB BLDV-MCNC: 8.2 G/DL — LOW (ref 13–17)
HGB BLDV-MCNC: 8.7 G/DL — LOW (ref 13–17)
HMPV RNA SPEC QL NAA+PROBE: NOT DETECTED — SIGNIFICANT CHANGE UP
HPIV1 RNA SPEC QL NAA+PROBE: NOT DETECTED — SIGNIFICANT CHANGE UP
HPIV2 RNA SPEC QL NAA+PROBE: NOT DETECTED — SIGNIFICANT CHANGE UP
HPIV3 RNA SPEC QL NAA+PROBE: NOT DETECTED — SIGNIFICANT CHANGE UP
HPIV4 RNA SPEC QL NAA+PROBE: NOT DETECTED — SIGNIFICANT CHANGE UP
HYALINE CASTS # UR AUTO: SIGNIFICANT CHANGE UP
HYPOCHROMIA BLD QL: SIGNIFICANT CHANGE UP
HYPOCHROMIA BLD QL: SIGNIFICANT CHANGE UP
IMM GRANULOCYTES NFR BLD AUTO: 0.8 % — SIGNIFICANT CHANGE UP (ref 0–1.5)
IMM GRANULOCYTES NFR BLD AUTO: 1.6 % — HIGH (ref 0–1.5)
INR BLD: 1.98 — HIGH (ref 0.88–1.17)
INR BLD: 2.5 — HIGH (ref 0.88–1.17)
KETONES UR-MCNC: SIGNIFICANT CHANGE UP
LACTATE BLDV-MCNC: 2.9 MMOL/L — HIGH (ref 0.5–2)
LACTATE BLDV-MCNC: 4.7 MMOL/L — CRITICAL HIGH (ref 0.5–2)
LACTATE BLDV-MCNC: 6.8 MMOL/L — CRITICAL HIGH (ref 0.5–2)
LEUKOCYTE ESTERASE UR-ACNC: NEGATIVE — SIGNIFICANT CHANGE UP
LIDOCAIN IGE QN: 186.8 U/L — HIGH (ref 7–60)
LYMPHOCYTES # BLD AUTO: 0.7 K/UL — LOW (ref 1–3.3)
LYMPHOCYTES # BLD AUTO: 0.74 K/UL — LOW (ref 1–3.3)
LYMPHOCYTES # BLD AUTO: 2.6 % — LOW (ref 13–44)
LYMPHOCYTES # BLD AUTO: 3.6 % — LOW (ref 13–44)
LYMPHOCYTES NFR SPEC AUTO: 0.9 % — LOW (ref 13–44)
LYMPHOCYTES NFR SPEC AUTO: 2.6 % — LOW (ref 13–44)
MAGNESIUM SERPL-MCNC: 1.9 MG/DL — SIGNIFICANT CHANGE UP (ref 1.6–2.6)
MCHC RBC-ENTMCNC: 23.1 PG — LOW (ref 27–34)
MCHC RBC-ENTMCNC: 23.5 PG — LOW (ref 27–34)
MCHC RBC-ENTMCNC: 28.5 % — LOW (ref 32–36)
MCHC RBC-ENTMCNC: 29.1 % — LOW (ref 32–36)
MCV RBC AUTO: 79.3 FL — LOW (ref 80–100)
MCV RBC AUTO: 82.4 FL — SIGNIFICANT CHANGE UP (ref 80–100)
METAMYELOCYTES # FLD: 0 % — SIGNIFICANT CHANGE UP (ref 0–1)
METAMYELOCYTES # FLD: 0 % — SIGNIFICANT CHANGE UP (ref 0–1)
MICROCYTES BLD QL: SLIGHT — SIGNIFICANT CHANGE UP
MICROCYTES BLD QL: SLIGHT — SIGNIFICANT CHANGE UP
MONOCYTES # BLD AUTO: 2.06 K/UL — HIGH (ref 0–0.9)
MONOCYTES # BLD AUTO: 3.14 K/UL — HIGH (ref 0–0.9)
MONOCYTES NFR BLD AUTO: 10.6 % — SIGNIFICANT CHANGE UP (ref 2–14)
MONOCYTES NFR BLD AUTO: 11.2 % — SIGNIFICANT CHANGE UP (ref 2–14)
MONOCYTES NFR BLD: 7.1 % — SIGNIFICANT CHANGE UP (ref 2–9)
MONOCYTES NFR BLD: 9.6 % — HIGH (ref 2–9)
MYELOCYTES NFR BLD: 0 % — SIGNIFICANT CHANGE UP (ref 0–0)
MYELOCYTES NFR BLD: 0 % — SIGNIFICANT CHANGE UP (ref 0–0)
NEUTROPHIL AB SER-ACNC: 86.9 % — HIGH (ref 43–77)
NEUTROPHIL AB SER-ACNC: 90.2 % — HIGH (ref 43–77)
NEUTROPHILS # BLD AUTO: 16.42 K/UL — HIGH (ref 1.8–7.4)
NEUTROPHILS # BLD AUTO: 23.75 K/UL — HIGH (ref 1.8–7.4)
NEUTROPHILS NFR BLD AUTO: 84.4 % — HIGH (ref 43–77)
NEUTROPHILS NFR BLD AUTO: 84.7 % — HIGH (ref 43–77)
NEUTS BAND # BLD: 0.9 % — SIGNIFICANT CHANGE UP (ref 0–6)
NEUTS BAND # BLD: 1.8 % — SIGNIFICANT CHANGE UP (ref 0–6)
NITRITE UR-MCNC: NEGATIVE — SIGNIFICANT CHANGE UP
NRBC # FLD: 0.02 K/UL — SIGNIFICANT CHANGE UP (ref 0–0)
NRBC # FLD: 0.04 K/UL — SIGNIFICANT CHANGE UP (ref 0–0)
OTHER - HEMATOLOGY %: 0 — SIGNIFICANT CHANGE UP
OTHER - HEMATOLOGY %: 0 — SIGNIFICANT CHANGE UP
OVALOCYTES BLD QL SMEAR: SLIGHT — SIGNIFICANT CHANGE UP
PCO2 BLDV: 37 MMHG — LOW (ref 41–51)
PCO2 BLDV: 40 MMHG — LOW (ref 41–51)
PCO2 BLDV: 42 MMHG — SIGNIFICANT CHANGE UP (ref 41–51)
PH BLDV: 7.34 PH — SIGNIFICANT CHANGE UP (ref 7.32–7.43)
PH BLDV: 7.38 PH — SIGNIFICANT CHANGE UP (ref 7.32–7.43)
PH BLDV: 7.4 PH — SIGNIFICANT CHANGE UP (ref 7.32–7.43)
PH UR: 5.5 — SIGNIFICANT CHANGE UP (ref 5–8)
PHOSPHATE SERPL-MCNC: 4.1 MG/DL — SIGNIFICANT CHANGE UP (ref 2.5–4.5)
PLATELET # BLD AUTO: 272 K/UL — SIGNIFICANT CHANGE UP (ref 150–400)
PLATELET # BLD AUTO: 305 K/UL — SIGNIFICANT CHANGE UP (ref 150–400)
PLATELET COUNT - ESTIMATE: NORMAL — SIGNIFICANT CHANGE UP
PLATELET COUNT - ESTIMATE: NORMAL — SIGNIFICANT CHANGE UP
PMV BLD: 10.4 FL — SIGNIFICANT CHANGE UP (ref 7–13)
PMV BLD: 10.4 FL — SIGNIFICANT CHANGE UP (ref 7–13)
PO2 BLDV: 24 MMHG — LOW (ref 35–40)
PO2 BLDV: 35 MMHG — SIGNIFICANT CHANGE UP (ref 35–40)
PO2 BLDV: 44 MMHG — HIGH (ref 35–40)
POIKILOCYTOSIS BLD QL AUTO: SLIGHT — SIGNIFICANT CHANGE UP
POIKILOCYTOSIS BLD QL AUTO: SLIGHT — SIGNIFICANT CHANGE UP
POLYCHROMASIA BLD QL SMEAR: SIGNIFICANT CHANGE UP
POLYCHROMASIA BLD QL SMEAR: SLIGHT — SIGNIFICANT CHANGE UP
POTASSIUM BLDV-SCNC: 4.1 MMOL/L — SIGNIFICANT CHANGE UP (ref 3.4–4.5)
POTASSIUM BLDV-SCNC: 4.1 MMOL/L — SIGNIFICANT CHANGE UP (ref 3.4–4.5)
POTASSIUM BLDV-SCNC: 4.2 MMOL/L — SIGNIFICANT CHANGE UP (ref 3.4–4.5)
POTASSIUM SERPL-MCNC: 4.5 MMOL/L — SIGNIFICANT CHANGE UP (ref 3.5–5.3)
POTASSIUM SERPL-MCNC: 4.6 MMOL/L — SIGNIFICANT CHANGE UP (ref 3.5–5.3)
POTASSIUM SERPL-SCNC: 4.5 MMOL/L — SIGNIFICANT CHANGE UP (ref 3.5–5.3)
POTASSIUM SERPL-SCNC: 4.6 MMOL/L — SIGNIFICANT CHANGE UP (ref 3.5–5.3)
PROMYELOCYTES # FLD: 0 % — SIGNIFICANT CHANGE UP (ref 0–0)
PROMYELOCYTES # FLD: 0 % — SIGNIFICANT CHANGE UP (ref 0–0)
PROT SERPL-MCNC: 6.5 G/DL — SIGNIFICANT CHANGE UP (ref 6–8.3)
PROT SERPL-MCNC: 6.5 G/DL — SIGNIFICANT CHANGE UP (ref 6–8.3)
PROT UR-MCNC: 70 — SIGNIFICANT CHANGE UP
PROTHROM AB SERPL-ACNC: 22.5 SEC — HIGH (ref 9.8–13.1)
PROTHROM AB SERPL-ACNC: 29.3 SEC — HIGH (ref 9.8–13.1)
RBC # BLD: 3.47 M/UL — LOW (ref 4.2–5.8)
RBC # BLD: 3.53 M/UL — LOW (ref 4.2–5.8)
RBC # FLD: 17.3 % — HIGH (ref 10.3–14.5)
RBC # FLD: 17.4 % — HIGH (ref 10.3–14.5)
RBC CASTS # UR COMP ASSIST: SIGNIFICANT CHANGE UP (ref 0–?)
REVIEW TO FOLLOW: YES — SIGNIFICANT CHANGE UP
RH IG SCN BLD-IMP: POSITIVE — SIGNIFICANT CHANGE UP
RSV RNA SPEC QL NAA+PROBE: NOT DETECTED — SIGNIFICANT CHANGE UP
RV+EV RNA SPEC QL NAA+PROBE: NOT DETECTED — SIGNIFICANT CHANGE UP
SALICYLATES SERPL-MCNC: < 5 MG/DL — LOW (ref 15–30)
SAO2 % BLDV: 29.1 % — LOW (ref 60–85)
SAO2 % BLDV: 51.3 % — LOW (ref 60–85)
SAO2 % BLDV: 72.6 % — SIGNIFICANT CHANGE UP (ref 60–85)
SCHISTOCYTES BLD QL AUTO: SLIGHT — SIGNIFICANT CHANGE UP
SODIUM SERPL-SCNC: 140 MMOL/L — SIGNIFICANT CHANGE UP (ref 135–145)
SODIUM SERPL-SCNC: 141 MMOL/L — SIGNIFICANT CHANGE UP (ref 135–145)
SP GR SPEC: 1.03 — SIGNIFICANT CHANGE UP (ref 1–1.04)
SQUAMOUS # UR AUTO: SIGNIFICANT CHANGE UP
TARGETS BLD QL SMEAR: SIGNIFICANT CHANGE UP
TARGETS BLD QL SMEAR: SLIGHT — SIGNIFICANT CHANGE UP
UROBILINOGEN FLD QL: NORMAL — SIGNIFICANT CHANGE UP
VARIANT LYMPHS # BLD: 0 % — SIGNIFICANT CHANGE UP
VARIANT LYMPHS # BLD: 0 % — SIGNIFICANT CHANGE UP
WBC # BLD: 19.39 K/UL — HIGH (ref 3.8–10.5)
WBC # BLD: 28.13 K/UL — HIGH (ref 3.8–10.5)
WBC # FLD AUTO: 19.39 K/UL — HIGH (ref 3.8–10.5)
WBC # FLD AUTO: 28.13 K/UL — HIGH (ref 3.8–10.5)
WBC UR QL: SIGNIFICANT CHANGE UP (ref 0–?)

## 2019-05-15 PROCEDURE — 99223 1ST HOSP IP/OBS HIGH 75: CPT | Mod: GC

## 2019-05-15 PROCEDURE — 74176 CT ABD & PELVIS W/O CONTRAST: CPT | Mod: 26

## 2019-05-15 PROCEDURE — 71045 X-RAY EXAM CHEST 1 VIEW: CPT | Mod: 26

## 2019-05-15 RX ORDER — SODIUM CHLORIDE 9 MG/ML
1000 INJECTION, SOLUTION INTRAVENOUS
Refills: 0 | Status: DISCONTINUED | OUTPATIENT
Start: 2019-05-15 | End: 2019-05-16

## 2019-05-15 RX ORDER — LISINOPRIL 2.5 MG/1
1 TABLET ORAL
Qty: 0 | Refills: 0 | DISCHARGE

## 2019-05-15 RX ORDER — GLUCAGON INJECTION, SOLUTION 0.5 MG/.1ML
1 INJECTION, SOLUTION SUBCUTANEOUS ONCE
Refills: 0 | Status: DISCONTINUED | OUTPATIENT
Start: 2019-05-15 | End: 2019-05-16

## 2019-05-15 RX ORDER — HEPARIN SODIUM 5000 [USP'U]/ML
8000 INJECTION INTRAVENOUS; SUBCUTANEOUS ONCE
Refills: 0 | Status: COMPLETED | OUTPATIENT
Start: 2019-05-15 | End: 2019-05-15

## 2019-05-15 RX ORDER — DEXTROSE 50 % IN WATER 50 %
25 SYRINGE (ML) INTRAVENOUS ONCE
Refills: 0 | Status: DISCONTINUED | OUTPATIENT
Start: 2019-05-15 | End: 2019-05-16

## 2019-05-15 RX ORDER — ACETAMINOPHEN 500 MG
650 TABLET ORAL ONCE
Refills: 0 | Status: COMPLETED | OUTPATIENT
Start: 2019-05-15 | End: 2019-05-15

## 2019-05-15 RX ORDER — PIPERACILLIN AND TAZOBACTAM 4; .5 G/20ML; G/20ML
3.38 INJECTION, POWDER, LYOPHILIZED, FOR SOLUTION INTRAVENOUS EVERY 8 HOURS
Refills: 0 | Status: DISCONTINUED | OUTPATIENT
Start: 2019-05-15 | End: 2019-05-16

## 2019-05-15 RX ORDER — APIXABAN 2.5 MG/1
1 TABLET, FILM COATED ORAL
Qty: 0 | Refills: 0 | DISCHARGE

## 2019-05-15 RX ORDER — PANTOPRAZOLE SODIUM 20 MG/1
40 TABLET, DELAYED RELEASE ORAL DAILY
Refills: 0 | Status: DISCONTINUED | OUTPATIENT
Start: 2019-05-15 | End: 2019-05-16

## 2019-05-15 RX ORDER — DEXTROSE 50 % IN WATER 50 %
12.5 SYRINGE (ML) INTRAVENOUS ONCE
Refills: 0 | Status: DISCONTINUED | OUTPATIENT
Start: 2019-05-15 | End: 2019-05-16

## 2019-05-15 RX ORDER — GEMFIBROZIL 600 MG
1 TABLET ORAL
Qty: 0 | Refills: 0 | DISCHARGE

## 2019-05-15 RX ORDER — PIPERACILLIN AND TAZOBACTAM 4; .5 G/20ML; G/20ML
3.38 INJECTION, POWDER, LYOPHILIZED, FOR SOLUTION INTRAVENOUS ONCE
Refills: 0 | Status: COMPLETED | OUTPATIENT
Start: 2019-05-15 | End: 2019-05-15

## 2019-05-15 RX ORDER — HEPARIN SODIUM 5000 [USP'U]/ML
INJECTION INTRAVENOUS; SUBCUTANEOUS
Qty: 25000 | Refills: 0 | Status: DISCONTINUED | OUTPATIENT
Start: 2019-05-15 | End: 2019-05-16

## 2019-05-15 RX ORDER — LACTULOSE 10 G/15ML
20 SOLUTION ORAL ONCE
Refills: 0 | Status: DISCONTINUED | OUTPATIENT
Start: 2019-05-15 | End: 2019-05-15

## 2019-05-15 RX ORDER — SODIUM CHLORIDE 9 MG/ML
500 INJECTION INTRAMUSCULAR; INTRAVENOUS; SUBCUTANEOUS ONCE
Refills: 0 | Status: COMPLETED | OUTPATIENT
Start: 2019-05-15 | End: 2019-05-15

## 2019-05-15 RX ORDER — EMPAGLIFLOZIN 10 MG/1
1 TABLET, FILM COATED ORAL
Qty: 0 | Refills: 0 | DISCHARGE

## 2019-05-15 RX ORDER — HEPARIN SODIUM 5000 [USP'U]/ML
4000 INJECTION INTRAVENOUS; SUBCUTANEOUS EVERY 6 HOURS
Refills: 0 | Status: DISCONTINUED | OUTPATIENT
Start: 2019-05-15 | End: 2019-05-16

## 2019-05-15 RX ORDER — DEXTROSE 50 % IN WATER 50 %
15 SYRINGE (ML) INTRAVENOUS ONCE
Refills: 0 | Status: DISCONTINUED | OUTPATIENT
Start: 2019-05-15 | End: 2019-05-16

## 2019-05-15 RX ORDER — INSULIN LISPRO 100/ML
VIAL (ML) SUBCUTANEOUS EVERY 6 HOURS
Refills: 0 | Status: DISCONTINUED | OUTPATIENT
Start: 2019-05-15 | End: 2019-05-16

## 2019-05-15 RX ORDER — SODIUM CHLORIDE 9 MG/ML
1000 INJECTION INTRAMUSCULAR; INTRAVENOUS; SUBCUTANEOUS
Refills: 0 | Status: DISCONTINUED | OUTPATIENT
Start: 2019-05-15 | End: 2019-05-16

## 2019-05-15 RX ORDER — VANCOMYCIN HCL 1 G
1000 VIAL (EA) INTRAVENOUS ONCE
Refills: 0 | Status: COMPLETED | OUTPATIENT
Start: 2019-05-15 | End: 2019-05-15

## 2019-05-15 RX ORDER — HEPARIN SODIUM 5000 [USP'U]/ML
8000 INJECTION INTRAVENOUS; SUBCUTANEOUS EVERY 6 HOURS
Refills: 0 | Status: DISCONTINUED | OUTPATIENT
Start: 2019-05-15 | End: 2019-05-16

## 2019-05-15 RX ADMIN — PIPERACILLIN AND TAZOBACTAM 3.38 GRAM(S): 4; .5 INJECTION, POWDER, LYOPHILIZED, FOR SOLUTION INTRAVENOUS at 14:20

## 2019-05-15 RX ADMIN — Medication 650 MILLIGRAM(S): at 14:20

## 2019-05-15 RX ADMIN — HEPARIN SODIUM 1800 UNIT(S)/HR: 5000 INJECTION INTRAVENOUS; SUBCUTANEOUS at 23:42

## 2019-05-15 RX ADMIN — SODIUM CHLORIDE 60 MILLILITER(S): 9 INJECTION INTRAMUSCULAR; INTRAVENOUS; SUBCUTANEOUS at 23:41

## 2019-05-15 RX ADMIN — SODIUM CHLORIDE 500 MILLILITER(S): 9 INJECTION INTRAMUSCULAR; INTRAVENOUS; SUBCUTANEOUS at 12:28

## 2019-05-15 RX ADMIN — SODIUM CHLORIDE 500 MILLILITER(S): 9 INJECTION INTRAMUSCULAR; INTRAVENOUS; SUBCUTANEOUS at 11:36

## 2019-05-15 RX ADMIN — HEPARIN SODIUM 8000 UNIT(S): 5000 INJECTION INTRAVENOUS; SUBCUTANEOUS at 23:42

## 2019-05-15 RX ADMIN — Medication 650 MILLIGRAM(S): at 11:40

## 2019-05-15 RX ADMIN — Medication 1 MILLIGRAM(S): at 14:21

## 2019-05-15 RX ADMIN — Medication 0: at 23:58

## 2019-05-15 RX ADMIN — SODIUM CHLORIDE 500 MILLILITER(S): 9 INJECTION INTRAMUSCULAR; INTRAVENOUS; SUBCUTANEOUS at 14:20

## 2019-05-15 RX ADMIN — Medication 250 MILLIGRAM(S): at 14:20

## 2019-05-15 RX ADMIN — PIPERACILLIN AND TAZOBACTAM 25 GRAM(S): 4; .5 INJECTION, POWDER, LYOPHILIZED, FOR SOLUTION INTRAVENOUS at 23:41

## 2019-05-15 RX ADMIN — PIPERACILLIN AND TAZOBACTAM 200 GRAM(S): 4; .5 INJECTION, POWDER, LYOPHILIZED, FOR SOLUTION INTRAVENOUS at 12:26

## 2019-05-15 NOTE — ED ADULT NURSE REASSESSMENT NOTE - GENERAL PATIENT STATE
cooperative/resting/sleeping/no change observed/smiling/interactive
smiling/interactive/cooperative/resting/sleeping/no change observed
family/SO at bedside/resting/sleeping/no change observed

## 2019-05-15 NOTE — H&P ADULT - PROBLEM SELECTOR PLAN 5
- on jardiance at home per documentation however wife stated on phone he took his insulin last night, unsure of dose  - A1C 9.8 in April  - currently NPO given mental status, ISS and FS q6 - rates in 60s, unable to obtain EKG due to continuous movements  - on apixaban outpatient however currently being held given recent GIB and planned to resume on 5/16  - will hold at this time - rates in 60s, unable to obtain EKG due to continuous movements  - on apixaban outpatient however currently being held given recent GIB  -per EMR notes, the plans were to resume anticoagulation on 5/16  - start empiric Heparin gtt as above

## 2019-05-15 NOTE — ED ADULT NURSE REASSESSMENT NOTE - NS ED NURSE REASSESS COMMENT FT1
Pt resting comfortably in bed, denies any pain/ symptoms at this time, spoke to ISRAEL Ramsey about pt temp and bp states to repeat vs after antibiotics and IVF are done due to pt elevated liver enzymes and recent GI bleed she states she does not want to give any tylenol or nsaids at this time. US performed at bedside at this time. Pt stable, in NAD, will continue to monitor.

## 2019-05-15 NOTE — ED ADULT NURSE NOTE - OBJECTIVE STATEMENT
Pt presents to rm 23, A&Ox4, ambulatory w/o assistance at baseline, pmhx of DM & HTN, here for evaluation of weakness and shaking chills that began this morning. Pt appears to be pale and diaphoretic at this time. Pt was discharged yesterday from [facility] for a GI bleed. Pt rectally rebrile 101.4F. Denies chest pain, shortness of breath, palpitations, headaches, dizziness, nausea, vomiting, diarrhea, or urinary symptoms at this time. Skin is warm and dry, one small skin tear observed to left buttock and another small skin tear observed to right buttock. IV established in left forearm with a 18G, labs drawn and sent, call bell in reach, warm blanket provided, bed in lowest position, side rails up x2, MD evaluation in progress, pt on telemetry- NSR@60. Will continue to monitor.

## 2019-05-15 NOTE — CONSULT NOTE ADULT - ATTENDING COMMENTS
I have examined patient and agree wit above exam an plan.  63 y/o male just discharged from [facility] s/p colonoscopy back in ED for fever and delirium.  PT without any focal signs on exam. Abd soft nontender. Ct ABD without any biliary obstruction or biliary stones to account for increase in LFTS. No free air on CT abd. ? sepsis . ? translocation of bacteria from mass found on colonoscopy.  Pt was mildly hypotensive but responded to fluids. Agree with broad spectrum antibiotics.    Pt maintaining airway and  hemodynamically stable at this point . Pt is not a MICU candidate. Will have MICU night attending evaluate patient again tonight.      I saw patient on 5/15/19. This was written on 5/16/19.

## 2019-05-15 NOTE — ED ADULT NURSE REASSESSMENT NOTE - NS ED NURSE REASSESS COMMENT FT1
1915: MD Blair came to reassess pt and states she does not want to given patient any medications that could sedate him at this time, states CT will have to wait at this time, MD 1915: MD Manjarrez came to reassess pt and states she does not want to given patient any medications that could sedate him at this time, states CT will have to wait at this time, MD Manjarrez states she will order fluids and labs. Awaiting orders at this time.

## 2019-05-15 NOTE — H&P ADULT - PROBLEM SELECTOR PLAN 6
- on lisinopril at home  - per wife had not taken it since d/c  - given concern for ischemic liver injury and sepsis as well as slight inc in creatinine will hold - on jardiance at home per documentation however wife stated on phone he took his insulin last night, unsure of dose  - A1C 9.8 in April  - currently NPO given mental status, ISS and FS q6 - on Jardiance at home per documentation however wife stated on phone he took his insulin last night, unsure of dose  - A1C 9.8 in April  - currently NPO given mental status, ISS and FS q6

## 2019-05-15 NOTE — CONSULT NOTE ADULT - ASSESSMENT
Patient is a 64M with GERD, DMII, HTN, a-flutter (on apixaban), severe AS, ppm for 3rd degree block, recent admission for GIB (with biopsy of cecal ulcerating lesion) who is p/w AMS, rigors with MICU c/s for sepsis    #Sepsis  Possibly 2/2 translocation following procedure and instrumentation, no other clear etiology, lungs without obvious infiltrate, UA negative  - BCx, RVP pending  - No clear obstructive etiology noted on CT to explain transaminitis, likely 2/2 distributive state   - c/w fluid resuscitation aggressively but given AS will need to monitor for worsening pulmonary edema  - Rec Tylenol for fever, wuold avoid NSAIDs given MARGARITA, bleeding risk  - Broad spectrum ABx, vitals Q4H  - Patient not a candidate for MICU at this time, please call back if condition changes despite resuscitative measures.

## 2019-05-15 NOTE — ED PROVIDER NOTE - ATTENDING CONTRIBUTION TO CARE
Dr. Davey: I performed a face to face bedside interview with patient regarding history of present illness, review of symptoms and past medical history. I completed an independent physical exam.  I have discussed patient's plan of care with PA.   I agree with note as stated above, having amended the EMR as needed to reflect my findings.   This includes HISTORY OF PRESENT ILLNESS, HIV, PAST MEDICAL/SURGICAL/FAMILY/SOCIAL HISTORY, ALLERGIES AND HOME MEDICATIONS, REVIEW OF SYSTEMS, PHYSICAL EXAM, and any PROGRESS NOTES during the time I functioned as the attending physician for this patient.    64M with pmh of GERD, DM type 2, HTN, anxiety, Atrial flutter (on apixaban), severe AS, third degree av block s/p PPM, admission at [facility]  for GIB, discharged yesterday, s/p colonoscopy showing cecal ulcer p/w AMS, generalized weakness, chills today. Pt denies any complaints but wife reports that he is confused. No falls, headache, neck stiffness, photophobia, abd pain, urinary symptoms, abd pain, vomiting. Pt has been tolerating PO. Had CT a/p yesterday that showed thickening of cecum c/w colonoscopy findings.    on exam febrile, stable vitals    GEN - chronically ill appearing,  A+O x3   HEAD - NC/AT     EYES - EOMI, no conjunctival pallor, no scleral icterus  ENT -   mucous membranes  moist , no discharge      NECK - Neck supple  PULM - CTA b/l,  symmetric breath sounds  COR -  RRR, S1 S2, +systolic murmur  ABD - , ND, NT, soft, no guarding, no rebound, no masses    BACK - no CVA tenderness, nontender spine     EXTREMS - no edema, no deformity, warm and well perfused    SKIN - no rash or bruising      NEUROLOGIC - alert, sensation nl, motor 5/5 RUE/LUE/RLE/LLE    concerning for sepsis, s/p 2 recent hospitalizations, will r/o  UTI,  PNA as a source. Possibly bacteremic from recent colonoscopy (on Monday). Pt had a CT yesterday showing no perforation. Do not suspect complication of colonoscopy given no abd pain or vomiting. s/p negative CT and tolerating PO. Plan for labs, including lactate, blood cultures, broad spectrum abx, UA, CXR, admit.

## 2019-05-15 NOTE — CONSULT NOTE ADULT - SUBJECTIVE AND OBJECTIVE BOX
HPI:    Patient is a 64M with GERD, DMII, HTN, a-flutter (on apixaban), severe AS, ppm for 3rd degree block, recent admission for GIB (with biopsy of cecal ulcerating lesion) who is p/w AMS, rigors. History obtained from wife at bedside. Patient felt well on d/c and last night. However upon awakening this a.m. he was confused and had near syncope upon standing, prompting them to bring him back to hospital. Patient has become more confused today, now mumbling, and rigoring. He has not had any cough, diarrhea, compliants of dysuria, night sweats as per wife.    PAST MEDICAL & SURGICAL HISTORY:  Arrhythmia as indication for cardiac pacemaker replacement  HLD (hyperlipidemia)  Depression  Diabetes mellitus type 2 in obese  HTN (hypertension)  Diabetes mellitus type II, controlled, with no complications  Dyslipidemia  HTN (hypertension)  No significant past surgical history        Allergies    No Known Drug Allergies  pollen (Rhinitis; Rhinorrhea; Eye Irritation)    Intolerances        Social History: Lives with wife, social EtOH use    FAMILY HISTORY:  No pertinent family history in first degree relatives      MEDICATIONS  (STANDING):    MEDICATIONS  (PRN):      Vital Signs Last 24 Hrs  T(C): 38.3 (15 May 2019 15:37), Max: 38.6 (15 May 2019 11:36)  T(F): 100.9 (15 May 2019 15:37), Max: 101.4 (15 May 2019 11:36)  HR: 64 (15 May 2019 15:37) (59 - 64)  BP: 144/80 (15 May 2019 15:37) (108/94 - 154/100)  BP(mean): --  RR: 20 (15 May 2019 15:37) (16 - 20)  SpO2: 95% (15 May 2019 15:37) (94% - 97%)  CAPILLARY BLOOD GLUCOSE    ROS: Unable to obtain 2/2 AMS    I&O's Summary      PHYSICAL EXAM:  GENERAL: rigoring  HEAD:  Atraumatic, Normocephalic  EYES: EOMI, PERRLA, conjunctiva and sclera clear  NECK: Supple, No JVD  CHEST/LUNG: Clear to auscultation bilaterally; No wheeze  HEART: Regular rate and rhythm; No murmurs, rubs, or gallops  ABDOMEN: Soft, Nontender, Nondistended; Bowel sounds present  EXTREMITIES:  2+ Peripheral Pulses, No clubbing, cyanosis, or edema  PSYCH: AAOx3  NEUROLOGY: non-focal  SKIN: No rashes or lesions    LABS:                        8.0    19.39 )-----------( 272      ( 15 May 2019 11:15 )             27.5     05-15    140  |  101  |  29<H>  ----------------------------<  250<H>  4.5   |  22  |  1.36<H>    Ca    8.8      15 May 2019 11:15  Mg     2.0         TPro  6.5  /  Alb  3.7  /  TBili  1.5<H>  /  DBili  x   /  AST  2047<H>  /  ALT  1398<H>  /  AlkPhos  183<H>  05-15    PT/INR - ( 15 May 2019 11:15 )   PT: 22.5 SEC;   INR: 1.98          PTT - ( 15 May 2019 11:15 )  PTT:27.6 SEC      Urinalysis Basic - ( 15 May 2019 15:58 )    Color: YELLOW / Appearance: CLEAR / S.026 / pH: 5.5  Gluc: 150 / Ketone: TRACE  / Bili: NEGATIVE / Urobili: NORMAL   Blood: SMALL / Protein: 70 / Nitrite: NEGATIVE   Leuk Esterase: NEGATIVE / RBC: 3-5 / WBC 0-2   Sq Epi: OCC / Non Sq Epi: x / Bacteria: NEGATIVE          RADIOLOGY & ADDITIONAL TESTS:    Imaging Personally Reviewed: X  < from: CT Abdomen and Pelvis No Cont (05.15.19 @ 14:38) >  FINDINGS:    LOWER CHEST: Trace right pleural effusion. Vascular congestion. Right ICD   leads. Aortic valvular calcifications.    LIVER: Within normal limits.  BILE DUCTS: Normal caliber.  GALLBLADDER: Within normal limits.  SPLEEN: Within normal limits.  PANCREAS: Within normal limits.  ADRENALS: Within normal limits.  KIDNEYS/URETERS: Nonobstructing stones on theleft measuring up to 6 mm.   Left renal cyst.    BLADDER: Opacified with contrast. There is minimal high density layering   in the dependent portion of the urinary bladder may represent stones..  REPRODUCTIVE ORGANS: Prostate within normal limits. Small free fluid in   the pelvis.    BOWEL: No bowel obstruction. Appendix is normal. Colonic diverticulosis.   Residual contrast in the large bowel from recent CT.  PERITONEUM: No free air.  VESSELS:  Mild vascular calcifications.  RETROPERITONEUM: No lymphadenopathy.    ABDOMINAL WALL: Mild bilateral gynecomastia. Mild anasarca.  BONES: Bilateral L5-S1 spondylolysis with very mild anterolisthesis at   this level.    IMPRESSION:   Small free fluid in the pelvis.  Mild pulmonary edema.    < end of copied text >      Consultant(s) Notes Reviewed:      Care Discussed with Consultants/Other Providers:

## 2019-05-15 NOTE — ED ADULT NURSE REASSESSMENT NOTE - NS ED NURSE REASSESS COMMENT FT1
Pt continues to be tremulous at this time, more responsive and follows command at this time, BP high and temp high, cold packs were placed on patient, MD Manjarrez made aware, no interventions ordered at this time. Will continue to monitor.

## 2019-05-15 NOTE — CHART NOTE - NSCHARTNOTEFT_GEN_A_CORE
Called by RN at approximately 6:55PM about patient's continued tremors. I explained to the RN that I had seen and assessed the patient at bedside twice, most recently at approximately 6:40PM. I noted that he had jerking movements but was able to be redirected temporarily when spoken to. I advised her that I did not want to administer Ativan prior to the patient being assessed by the admitting resident at 7:00PM who would come to the bedside, perform a full history and physical, would be entering the admission orders for the patient, and caring for him overnight for twelve hours. The patient is hemodynamically stable and the movements are not endangering himself or others at this time. This responsibility was not signed out to a night MAR as documented in the ED nurse reassessment note at 18:55.    Nikhil Greco, PGY-3  MAR  [id number]

## 2019-05-15 NOTE — ED ADULT TRIAGE NOTE - CHIEF COMPLAINT QUOTE
Pt brought in by EMS from home complaining of shaking, weakness. Pt denies chest pain, sob, n/v/d, fever or chills. Pt states he was recently admitted for a GI bleed.

## 2019-05-15 NOTE — H&P ADULT - PROBLEM SELECTOR PLAN 7
- on lisinopril at home  - per wife had not taken it since d/c  - given concern for sepsis as well as slight inc in creatinine will hold

## 2019-05-15 NOTE — ED ADULT NURSE REASSESSMENT NOTE - NS ED NURSE REASSESS COMMENT FT1
Pt continues to be tremulous at this time, CT transporter came to receive pt for pending CT head but pt is very tremulous does not appear like he will be able to tolerate CT. MAR provider Mina Greco called at this time and was made aware states "I will give report to night MAR provider at 1900 who will come and assess pt and make a decision about patient."

## 2019-05-15 NOTE — ED PROVIDER NOTE - PROGRESS NOTE DETAILS
Pt appears uncomfortable - having rigors. Elevated LFTs - discussed with family - no tylenol/alcohol use. Will give another 500cc bolus and obtain lipase, ammonia level and tox screen (acetaminophen level/alcohol screen) ISRAEL March: pt rigorous, appears uncomfortable. Pt persistently febrile. LFTs elevated - discussed with the family. POCT US of RUQ with limited view of CBD, but appears to be wnl.  no GB stones. no sonographic spain's. Will get CT noncon to eval CBD then likely ICU c/s vs surgery/GI. Will add on amonia level. Pt denies  tylenol use at home. CT negative for biliary obstruction. Ammonia level elevated, will give  lactulose for presumed hepatic encephalopathy. will repeat lactate.  ICU c/s called to eval for sepsis with multi organ failure.

## 2019-05-15 NOTE — ED PROVIDER NOTE - OBJECTIVE STATEMENT
64 year old male with a PMHx of GERD, DM type 2, HTN, anxiety, Atrial flutter (on apixaban), severe AS, third degree av block s/p PPM pw generalized weakness and shaking chills that began this morning. Pt was discharged yesterday from [facility] for GI bleed - which was the patients second admission. First admission 4/20 second admission 5/8-5/14. During admission pt had upper EGD and colonoscopy - which showed "a single (solitary) ulcer in the cecum/ascending colon was found, likely source of bleeding." Pts wife with the patient and states that he was barely able to walk to the bathroom this morning due to weakness and had confusion about the day of the week it was and had shaking chills. Endorses chronic sinus issues. States that he has not had a bowel movement since colonoscopy. Denies n/v, fevers, CP, SOB, cough, abdominal pain, dysuria, hematuria.

## 2019-05-15 NOTE — H&P ADULT - PROBLEM SELECTOR PLAN 3
- recent admissions for GI bleed, likely right colonic given ulcerated cecal lesion seen on colonoscopy, clean based ulcer on EGD  - acute rise in transaminases since d/c yesterday   - will r/o acute hepatitis  - no new meds added during hospitalization, no OTC meds taken, no recent travel, no alcohol use, no tylenol use per wife  - possibly shock liver given severe AS, recent GI bleed as well as sepsis of unknown source  - s/p 500cc x2, will c/w fluids, avoid hypotension however cautiously given severe AS - recent admissions for GI bleed, likely right colonic given ulcerated cecal lesion seen on colonoscopy, clean based ulcer on EGD  - acute rise in transaminases since d/c yesterday, elevated INR however also on eliquis  - will r/o acute hepatitis  - no new meds added during hospitalization, no OTC meds taken, no recent travel, no alcohol use, no tylenol use per wife  - possibly shock liver given severe AS, recent GI bleed as well as sepsis of unknown source  - s/p 500cc x2, will c/w fluids, avoid hypotension however cautiously given severe AS - given recent GI instrumentation and cecal bx possible gut translocation  - cultures sent  - c/w zosyn

## 2019-05-15 NOTE — CHART NOTE - NSCHARTNOTEFT_GEN_A_CORE
Discussed case with GI fellow. Patient in acute liver failure with concern for budd chiari vs infectious etiology. Rechecking labs to assess progression and if worsening will need transfer to transplant center. Will start heparin gtt and get urgent US doppler, on zosyn, cultures drawn.     Further work up ordered for liver failure including antibodies, CMV, EBV, HSV.     Discussed with radiology regarding vasculature on CT a/p. Unable to comment on presence/absence of thrombosis in portal circulation due to lack of contrast - US doppler ordered as above.     Sumi Manjarrez MD  PGY2 | Internal Medicine  [phone] / [phone] Discussed case with GI fellow. Patient in acute liver failure with concern for budd chiari vs infectious etiology. Rechecking labs to assess progression and if worsening will need transfer to transplant center. Will start heparin gtt and get urgent US doppler, on zosyn, cultures drawn.     Further work up ordered for liver failure including antibodies, CMV, EBV, HSV.     Discussed with radiology regarding vasculature on CT a/p. Unable to comment on presence/absence of thrombosis in portal circulation due to lack of contrast - US doppler ordered as above and radiology contacted to expedite.     Called wife Liset at [phone], explained patient's liver is continuing to fail and getting worse. Explained treatment course of blood thinner for potential blood clot as well as broad spectrum antibiotics. Discussed patient is extremely sick right now and may need transfer to liver transplant center ([facility]). Discussed code status with wife and states she would like compressions and intubation if needed - patient is FULL CODE.       Sumi Manjarrez MD  PGY2 | Internal Medicine  [phone] / [phone] Discussed case with GI fellow. Patient in acute liver failure with concern for budd chiari vs infectious etiology. Rechecking labs to assess progression and if worsening will need transfer to transplant center. Will start heparin gtt and get urgent US doppler, on zosyn, cultures drawn.     Further work up ordered for liver failure including antibodies, CMV, EBV, HSV.     Discussed with radiology regarding vasculature on CT a/p. Unable to comment on presence/absence of thrombosis in portal circulation due to lack of contrast - US doppler ordered as above and radiology contacted to expedite.     Called wife Liset at [phone], explained patient's liver is continuing to fail and getting worse. Explained treatment course of blood thinner for potential blood clot as well as broad spectrum antibiotics. Discussed patient is extremely sick right now and may need transfer to liver transplant center ([facility]). Discussed code status with wife and states she would like compressions and intubation if needed - patient is FULL CODE.    Agree with the above,  ORAL Blood, Med Attg        Sumi Manjarrez MD  PGY2 | Internal Medicine  [phone] / [phone]

## 2019-05-15 NOTE — ED ADULT NURSE REASSESSMENT NOTE - NS ED NURSE REASSESS COMMENT FT1
Pt. with increase restlessness. unable to keep still for CT scan. MD Yu made aware.  Pt. with hepatic encephalopathy repeat lactate 6.8. MD Yu aware. Pt. to be ordered for heparin drip.

## 2019-05-15 NOTE — H&P ADULT - NSHPPHYSICALEXAM_GEN_ALL_CORE
Vital Signs Last 24 Hrs  T(C): 38.9 (15 May 2019 19:45), Max: 38.9 (15 May 2019 19:45)  T(F): 102 (15 May 2019 19:45), Max: 102 (15 May 2019 19:45)  HR: 64 (15 May 2019 19:45) (59 - 64)  BP: 145/112 (15 May 2019 19:45) (108/94 - 154/100)  BP(mean): --  RR: 20 (15 May 2019 19:45) (16 - 20)  SpO2: 97% (15 May 2019 19:45) (94% - 97%)    GENERAL: continuous jerking movements, opens eyes to loud verbal stimuli and sternal rub however unable to verbalize, diaphoretic  HEAD:  Atraumatic, Normocephalic  EYES: PERRLA, conjunctiva and sclera clear  NECK: Supple  CHEST/LUNG: tachypneic, upper airway sounds throughout, no appreciable wheezing  HEART: unable to appreciate due to airway sounds  ABDOMEN: Soft, Nondistended; Bowel sounds present  EXTREMITIES:  2+ Peripheral Pulses, No clubbing, cyanosis, or edema  PSYCH: AAOx0  NEUROLOGY: continuous jerking movements, unable to follow commands  SKIN: No rashes or lesions Vital Signs Last 24 Hrs  T(C): 38.9 (15 May 2019 19:45), Max: 38.9 (15 May 2019 19:45)  T(F): 102 (15 May 2019 19:45), Max: 102 (15 May 2019 19:45)  HR: 64 (15 May 2019 19:45) (59 - 64)  BP: 145/112 (15 May 2019 19:45) (108/94 - 154/100)  BP(mean): --  RR: 20 (15 May 2019 19:45) (16 - 20)  SpO2: 97% (15 May 2019 19:45) (94% - 97%)

## 2019-05-15 NOTE — H&P ADULT - PROBLEM SELECTOR PLAN 2
- 2/2 sepsis and hepatic encephalopathy  - mild uremia, electrolytes wnl  - w/u and management for sepsis/liver failure as above - recent admissions for GI bleed, likely right colonic given ulcerated cecal lesion seen on colonoscopy, clean based ulcer on EGD  - acute rise in transaminases since d/c yesterday, elevated INR however also on eliquis  - will r/o acute hepatitis given recent blood transfusion  - currently holding AC due to recent GIB and bx - need to r/o budd chiari, will review CT with radiology, doppler ordered  - no new meds added during hospitalization, no OTC meds taken, no recent travel, no alcohol use, no tylenol use per wife  - possibly ischemic given severe AS, recent GI bleed as well as sepsis of unknown source  - s/p 500cc x2, will c/w fluids, avoid hypotension however cautiously given severe AS - recent admissions for GI bleed, likely right colonic given ulcerated cecal lesion seen on colonoscopy, clean based ulcer on EGD  - acute rise in transaminases since d/c yesterday, elevated INR    - will r/o acute hepatitis given recent blood transfusion  - will start empiric Heparin gtt for possible Budd Chiari syndrome (see attg's addendum below)  -will re-review CT A/P with radiology regarding possible hepatic venous outflow obstruction  - will attempt to obtain US abd with liver doppler   - no new meds added during hospitalization, no OTC meds taken, no recent travel, no alcohol use, no tylenol use per wife (Tox negative)   - s/p 500cc x2, will c/w fluids  -avoid hypotension however cautiously given known severe AS  -Cannot exclude autoimmune or infectious hepatitis, labs pending

## 2019-05-15 NOTE — H&P ADULT - HISTORY OF PRESENT ILLNESS
64M with GERD, T2DM on insulin, HTN, anxiety, Atrial flutter (on apixaban), severe aortic stenosis, third degree av block s/p PPM p/w 1 day of jerking movements. History obtained from wife and chart as patient is unable to communicate due to encephalopathy. Wife states patient was discharged yesterday from [facility] after being admitted for GIB and found to have ulcer cecal lesion. Patient was in his usual state of health, AOx3, ambulatory and they went to dinner last night. This morning she asked him to drive her to the train station and he thought it was Sunday. He got up and started uncontrollably shaking so she called 911. Wife denies any fevers, chills, coughing, N/V/D, abdominal pain, dysuria, CP, SOB since d/c. Denies recent travel, change in medications, use of OTC meds, alcohol use.     Of note patient admitted twice recently for melena. First admission attributed to erosive gastritis. Second admission with cecal ulcer and clean based gastric ulcer, received 1.5U PRBCs. Cecal bx negative for malignancy however planned for repeat colonoscopy as visualization was not adequate. Plan was to resume AC on 5/16 since bx was done. 63yo Male with GERD, Type 2 DM on insulin, HTN, anxiety, Atrial flutter (on apixaban), severe aortic stenosis, third degree av block s/p PPM p/w 1 day of jerking movements. History obtained from wife and chart as patient is unable to communicate due to encephalopathy. Wife states patient was discharged yesterday from [facility] after being admitted for GIB and found to have ulcer cecal lesion. Patient was in his usual state of health, AOx3, ambulatory and they went to dinner last night. This morning she asked him to drive her to the train station and he thought it was Sunday. He got up and started uncontrollably shaking so she called 911. Wife denies any fevers, chills, coughing, N/V/D, abdominal pain, dysuria, CP, SOB since d/c. Denies recent travel, change in medications, use of OTC meds, alcohol use.     Of note patient admitted twice recently for melena. First admission attributed to erosive gastritis. Second admission with cecal ulcer and clean based gastric ulcer, received 1.5U PRBCs. Cecal bx negative for malignancy however planned for repeat colonoscopy as visualization was not adequate. Plan was to resume AC on 5/16 since bx was done.

## 2019-05-15 NOTE — H&P ADULT - PROBLEM SELECTOR PLAN 4
- rates in 60s, unable to obtain EKG due to continuous movements  - on apixaban outpatient however currently being held given recent GIB and planned to resume on 5/16  - will hold at this time - 2/2 acute liver failure  - w/u and management for sepsis/liver failure as above - 2/2 acute liver failure  - w/u and management for sepsis/liver failure as above  -NPO  -aspiration precautions

## 2019-05-15 NOTE — H&P ADULT - NSHPLABSRESULTS_GEN_ALL_CORE
Comprehensive Metabolic Panel (05.15.19 @ 11:15)    Sodium, Serum: 140 mmol/L    Potassium, Serum: 4.5 mmol/L    Chloride, Serum: 101 mmol/L    Carbon Dioxide, Serum: 22 mmol/L    Anion Gap, Serum: 17 mmo/L    Blood Urea Nitrogen, Serum: 29 mg/dL    Creatinine, Serum: 1.36 mg/dL    Glucose, Serum: 250 mg/dL    Calcium, Total Serum: 8.8 mg/dL    Protein Total, Serum: 6.5 g/dL    Albumin, Serum: 3.7 g/dL    Bilirubin Total, Serum: 1.5 mg/dL    Alkaline Phosphatase, Serum: 183 u/L    Aspartate Aminotransferase (AST/SGOT): 2047 u/L    Alanine Aminotransferase (ALT/SGPT): 1398 u/L    eGFR if Non : 55    eGFR if : 64 mL/min    Blood Gas Venous Comprehensive (05.15.19 @ 17:13)    Blood Gas Venous - Lactate: 2.9: Please note updated reference range. mmol/L    Blood Gas Venous - Chloride: 104 mmol/L    Blood Gas Venous - Creatinine: 1.59 mg/dL    pH, Venous: 7.38 pH    pCO2, Venous: 42 mmHg    pO2, Venous: 24 mmHg    HCO3, Venous: 23 mmol/L    Base Excess, Venous: 0.0: REFERENCE RANGE = -3 + 2 mmol/L mmol/L    Oxygen Saturation, Venous: 29.1 %    Blood Gas Venous - Sodium: 137 mmol/L    Blood Gas Venous - Potassium: 4.2 mmol/L    Blood Gas Venous - Glucose: 160 mg/dL    Blood Gas Venous - Hemoglobin: 8.2 g/dL    Blood Gas Venous - Hematocrit: 25.6 %    Complete Blood Count + Automated Diff (05.15.19 @ 11:15)    Nucleated RBC #: 0.02 K/uL    Review to Follow: YES    WBC Count: 19.39 K/uL    RBC Count: 3.47 M/uL    Hemoglobin: 8.0: Delta: 11.9 on 08/22/  Delta: 11.9 on 08/22/ g/dL    Hematocrit: 27.5 %    Mean Cell Volume: 79.3 fL    Mean Cell Hemoglobin: 23.1 pg    Mean Cell Hemoglobin Conc: 29.1 %    Red Cell Distrib Width: 17.4 %    Platelet Count - Automated: 272 K/uL    MPV: 10.4 fl    Auto Neutrophil #: 16.42 K/uL    Auto Lymphocyte #: 0.70 K/uL    Auto Monocyte #: 2.06 K/uL    Auto Eosinophil #: 0.01 K/uL    Auto Basophil #: 0.04 K/uL    Auto Neutrophil %: 84.7 %    Auto Lymphocyte %: 3.6 %    Auto Monocyte %: 10.6 %    Auto Eosinophil %: 0.1 %    Auto Basophil %: 0.2 %    Auto Immature Granulocyte %: 0.8: (Includes meta, myelo and promyelocytes) %    Neutrophils %: 86.9 %    Band Neutrophils %: 0.9 %    Lymphocytes %: 2.6 %    Monocytes %: 9.6 %    Eosinophils %: 0.0 %    Basophils %: 0 %    Reactive Lymphocytes %: 0 %    Metamyelocytes %: 0 %    Myelocytes %: 0 %    Promyelocytes %: 0 %    Blasts %: 0 %    Prothrombin Time and INR, Plasma in AM (05.15.19 @ 11:15)    Prothrombin Time, Plasma: 22.5 SEC    INR: 1.98    < from: CT Abdomen and Pelvis No Cont (05.15.19 @ 14:38) >  LOWER CHEST: Trace right pleural effusion. Vascular congestion. Right ICD   leads. Aortic valvular calcifications.    LIVER: Within normal limits.  BILE DUCTS: Normal caliber.  GALLBLADDER: Within normal limits.  SPLEEN: Within normal limits.  PANCREAS: Within normal limits.    IMPRESSION:   Small free fluid in the pelvis.  Mild pulmonary edema.    < end of copied text > XR CHEST AP OR PA 1V    PROCEDURE DATE:  May 15 2019   INTERPRETATION:  CLINICAL INDICATION: generalized weakness; shaking   chills; diabetes; hypertension; cardiac disease  EXAM:  Portable frontal chest from 5/15/2019 at 1220. Compared to prior study   from 5/8/2019.  Projection lordotic.  Rotated left.   IMPRESSION:  Persistent slight right hemidiaphragm elevation.   Underinflated lungs. Thin curvilinear opacity in right lung base probably   representing subsegmental atelectatic change. Clear remaining visualized   lungs. No pleural effusions or pneumothorax.  Stable left chest wall dual-lead pacemaker. Cardiac and mediastinal   silhouettes appear prominent but inaccurately assessed on this projection.  Trachea midline.  Unremarkable osseous structures.            Complete Blood Count + Automated Diff (05.15.19 @ 11:15)    Nucleated RBC #: 0.02 K/uL    Review to Follow: YES    WBC Count: 19.39 K/uL    RBC Count: 3.47 M/uL    Hemoglobin: 8.0: Delta: 11.9 on 08/22/  Delta: 11.9 on 08/22/ g/dL    Hematocrit: 27.5 %    Mean Cell Volume: 79.3 fL    Mean Cell Hemoglobin: 23.1 pg    Mean Cell Hemoglobin Conc: 29.1 %    Red Cell Distrib Width: 17.4 %    Platelet Count - Automated: 272 K/uL    MPV: 10.4 fl    Auto Neutrophil #: 16.42 K/uL    Auto Lymphocyte #: 0.70 K/uL    Auto Monocyte #: 2.06 K/uL    Auto Eosinophil #: 0.01 K/uL    Auto Basophil #: 0.04 K/uL    Auto Neutrophil %: 84.7 %    Auto Lymphocyte %: 3.6 %    Auto Monocyte %: 10.6 %    Auto Eosinophil %: 0.1 %    Auto Basophil %: 0.2 %    Auto Immature Granulocyte %: 0.8: (Includes meta, myelo and promyelocytes) %    Neutrophils %: 86.9 %    Band Neutrophils %: 0.9 %    Lymphocytes %: 2.6 %    Monocytes %: 9.6 %    Eosinophils %: 0.0 %    Basophils %: 0 %    Reactive Lymphocytes %: 0 %    Metamyelocytes %: 0 %    Myelocytes %: 0 %    Promyelocytes %: 0 %    Blasts %: 0 %    Prothrombin Time and INR, Plasma in AM (05.15.19 @ 11:15)    Prothrombin Time, Plasma: 22.5 SEC    INR: 1.98    < from: CT Abdomen and Pelvis No Cont (05.15.19 @ 14:38) >  LOWER CHEST: Trace right pleural effusion. Vascular congestion. Right ICD   leads. Aortic valvular calcifications.    LIVER: Within normal limits.  BILE DUCTS: Normal caliber.  GALLBLADDER: Within normal limits.  SPLEEN: Within normal limits.  PANCREAS: Within normal limits.    IMPRESSION:   Small free fluid in the pelvis.  Mild pulmonary edema.    < end of copied text > XR CHEST AP OR PA 1V    PROCEDURE DATE:  May 15 2019   INTERPRETATION:  CLINICAL INDICATION: generalized weakness; shaking   chills; diabetes; hypertension; cardiac disease  EXAM:  Portable frontal chest from 5/15/2019 at 1220. Compared to prior study   from 5/8/2019.  Projection lordotic.  Rotated left.   IMPRESSION:  Persistent slight right hemidiaphragm elevation.   Underinflated lungs. Thin curvilinear opacity in right lung base probably   representing subsegmental atelectatic change. Clear remaining visualized   lungs. No pleural effusions or pneumothorax.  Stable left chest wall dual-lead pacemaker. Cardiac and mediastinal   silhouettes appear prominent but inaccurately assessed on this projection.  Trachea midline.  Unremarkable osseous structures.    EKG, 5/16/2019, 00:51, V-paced rhythm, 60bpm - my reading         Complete Blood Count + Automated Diff (05.15.19 @ 11:15)    Nucleated RBC #: 0.02 K/uL    Review to Follow: YES    WBC Count: 19.39 K/uL    RBC Count: 3.47 M/uL    Hemoglobin: 8.0: Delta: 11.9 on 08/22/  Delta: 11.9 on 08/22/ g/dL    Hematocrit: 27.5 %    Mean Cell Volume: 79.3 fL    Mean Cell Hemoglobin: 23.1 pg    Mean Cell Hemoglobin Conc: 29.1 %    Red Cell Distrib Width: 17.4 %    Platelet Count - Automated: 272 K/uL    MPV: 10.4 fl    Auto Neutrophil #: 16.42 K/uL    Auto Lymphocyte #: 0.70 K/uL    Auto Monocyte #: 2.06 K/uL    Auto Eosinophil #: 0.01 K/uL    Auto Basophil #: 0.04 K/uL    Auto Neutrophil %: 84.7 %    Auto Lymphocyte %: 3.6 %    Auto Monocyte %: 10.6 %    Auto Eosinophil %: 0.1 %    Auto Basophil %: 0.2 %    Auto Immature Granulocyte %: 0.8: (Includes meta, myelo and promyelocytes) %    Neutrophils %: 86.9 %    Band Neutrophils %: 0.9 %    Lymphocytes %: 2.6 %    Monocytes %: 9.6 %    Eosinophils %: 0.0 %    Basophils %: 0 %    Reactive Lymphocytes %: 0 %    Metamyelocytes %: 0 %    Myelocytes %: 0 %    Promyelocytes %: 0 %    Blasts %: 0 %    Prothrombin Time and INR, Plasma in AM (05.15.19 @ 11:15)    Prothrombin Time, Plasma: 22.5 SEC    INR: 1.98    < from: CT Abdomen and Pelvis No Cont (05.15.19 @ 14:38) >  LOWER CHEST: Trace right pleural effusion. Vascular congestion. Right ICD   leads. Aortic valvular calcifications.    LIVER: Within normal limits.  BILE DUCTS: Normal caliber.  GALLBLADDER: Within normal limits.  SPLEEN: Within normal limits.  PANCREAS: Within normal limits.    IMPRESSION:   Small free fluid in the pelvis.  Mild pulmonary edema.    < end of copied text >

## 2019-05-15 NOTE — H&P ADULT - PROBLEM SELECTOR PLAN 1
- patient presenting with fever to 102, leukocytosis neutrophilic, encephalopathic with acute liver failure  - unknown source - history obtained from wife as patient is unable to answer questions, per wife no coughing, abd pain, vomiting, diarrhea, urinary symptoms  - blood cultures and urine cultures sent  - CT as above - no source identified, small amount of free pelvic fluid, unlikely SBP  - RVP negative  - acute hep panel sent  - c/w vanc/zosyn   - s/p 500cc NS x2, will c/w maintenance fluids cautiously as patient has severe AS and pulm edema noted on CT - patient presenting with fever to 102, leukocytosis neutrophilic, encephalopathic with acute liver failure  - unknown source - history obtained from wife as patient is unable to answer questions, per wife no coughing, abd pain, vomiting, diarrhea, urinary symptoms  - blood cultures and urine cultures sent  - CT as above - no source identified, small amount of free pelvic fluid and mild pulm edema noted, pancreas wnl, liver wnl  - RVP negative  - acute hep panel sent  - possible bacteremia 2/2 translocation from recent GI instrumentation and bx  - c/w zosyn   - s/p 500cc NS x2, will c/w maintenance fluids cautiously as patient has severe AS and pulm edema noted on CT  - vitals q4 - patient presenting with fever to 102, leukocytosis neutrophilic, encephalopathic with acute worsening liver failure  - unknown source - history obtained from wife as patient is unable to answer questions, per wife no coughing, abd pain, vomiting, diarrhea, urinary symptoms  - blood cultures and urine cultures sent  - CT as above - no source identified, small amount of free pelvic fluid and mild pulm edema noted, pancreas wnl, liver wnl  - RVP negative  - acute hep panel sent  - possible bacteremia 2/2 translocation from recent GI instrumentation and bx  - c/w zosyn   - s/p 500cc NS x2, will c/w maintenance fluids cautiously as patient has severe AS and pulm edema noted on CT  - vitals q4

## 2019-05-15 NOTE — ED ADULT NURSE REASSESSMENT NOTE - NS ED NURSE REASSESS COMMENT FT1
Pt resting comfortably in bed, pt stable, in NAD, report given to [facility] 3 RN Megha who states she has to speak to her manager before she accepts the patient, Charge KO Rothman made aware and states she will let YASMIN Peña know, report given to night KO Soriano, will continue to monitor.

## 2019-05-15 NOTE — H&P ADULT - ASSESSMENT
64M with GERD, T2DM on insulin, HTN, anxiety, Atrial flutter (on apixaban), severe aortic stenosis, third degree av block s/p PPM p/w 1 day of jerking movements found to be septic with acute liver injury. 63yo Male with GERD, Type 2 DM on insulin, HTN, anxiety, Atrial flutter (on apixaban), severe aortic stenosis, third degree av block s/p PPM p/w 1 day of jerking movements. History obtained from wife and chart as patient is unable to communicate due to encephalopathy. Wife states patient was discharged yesterday from [facility] after being admitted for GIB and found to have ulcer cecal lesion. Patient was in his usual state of health, AOx3, ambulatory and they went to dinner last night. This morning she asked him to drive her to the train station and he thought it was Sunday. He got up and started uncontrollably shaking so she called 911. Wife denies any fevers, chills, coughing, N/V/D, abdominal pain, dysuria, CP, SOB since d/c. Denies recent travel, change in medications, use of OTC meds, alcohol use.     Of note patient admitted twice recently for melena. First admission attributed to erosive gastritis. Second admission with cecal ulcer and clean based gastric ulcer, received 1.5U PRBCs. Cecal bx negative for malignancy however planned for repeat colonoscopy as visualization was not adequate. Plan was to resume AC on 5/16 since bx was done.             64M with GERD, T2DM on insulin, HTN, anxiety, Atrial flutter (on apixaban), severe aortic stenosis, third degree av block s/p PPM p/w 1 day of jerking movements found to be septic with acute liver injury. 63yo Male with GERD, Type 2 DM on insulin, HTN, anxiety, Atrial flutter (on apixaban), severe aortic stenosis, third degree av block s/p PPM, recent admission for acute anemia due to GI bleed (cecal ulcer and clean based gastric ulcer) requiring PRBC transfusion and holding anticoagulation; On this re-hospitalization a/w likely hepatic encephalopathy due to worsening hepatic failure 2/2 possible BCS; Found to be septic; Cannot exclude autoimmune or infectious hepatitis or bacteremia of GI source; Hepatology consulted;

## 2019-05-15 NOTE — ED ADULT NURSE REASSESSMENT NOTE - NS ED NURSE REASSESS COMMENT FT1
Pt continues to be very tremulous at this time, pt no longer able unable to turn on command to reposition, pt appears to be A&Ox2, less responsive but stable at this time. Respirations even and unlabored, NSR @62  on the monitor. 2nd IV (18G) was placed to left forearm. MAR provider Mina Greco made aware and states he will come to ED to reassess patient.

## 2019-05-15 NOTE — H&P ADULT - ATTENDING COMMENTS
Addendum: 23:50, 5/15    Pt presented to me by PGY-2  at appx 10pm, case discussed with Dr. Manjarrez in depth. Pt seen and evaluated on 5/15; Concerned for Budd-Chiari syndrome given acutely worsening liver function including rising (repeat) LFTs and (repeat) lactate in the context of having held anticoagulation (Apixaban) on 5/8-5/14 admission for acute anemia requiring transfusion and GI c/s (EGD and colposcopy). GI hepatology consulted via phone by Dr. Yuen at my request. The pt may need to be transferred to liver transplant center. Addendum 1: 23:50, 5/15    Pt presented to me by PGY-2  at appx 10pm, case discussed with Dr. Manjarrez in depth. Pt seen and evaluated on 5/15; Concerned for Budd-Chiari syndrome given acutely worsening liver function including rising (repeat) LFTs and (repeat) lactate in the context of having held anticoagulation (Apixaban) on 5/8-5/14 admission for acute anemia requiring transfusion and GI c/s (EGD and colposcopy). GI hepatology consulted via phone by Dr. Yuen at my request. The pt may need to be transferred to liver transplant center.    Addendum 2: 00:05, 5/16;  Spoke with US , not able to perform doppler aspect of the US Abd test, which requires vascular attg, Dr. Méndez (not available night);   Made decision to cont with empiric Heparin gtt for possible BCS until pt evaluated by GI hepatology fellow and possibly transported to liver transplant center;

## 2019-05-15 NOTE — ED PROVIDER NOTE - CLINICAL SUMMARY MEDICAL DECISION MAKING FREE TEXT BOX
64 year old male with a PMHx of GERD, DM type 2, HTN, anxiety, Atrial flutter (on apixaban), severe AS, third degree av block s/p PPM pw generalized weakness and shaking chills that began this morning - recent admission for GI bleed s/p colonoscopy.  Plan: rectal temp - if febrile r/o bacteremia, labs, UA r/o UTI, cxr r/o PNA

## 2019-05-16 ENCOUNTER — OTHER (OUTPATIENT)
Age: 64
End: 2019-05-16

## 2019-05-16 LAB
HAV IGM SER-ACNC: NONREACTIVE — SIGNIFICANT CHANGE UP
HBV CORE IGM SER-ACNC: NONREACTIVE — SIGNIFICANT CHANGE UP
HBV SURFACE AG SER-ACNC: NONREACTIVE — SIGNIFICANT CHANGE UP
HCV AB S/CO SERPL IA: 0.11 S/CO — SIGNIFICANT CHANGE UP (ref 0–0.99)
HCV AB SERPL-IMP: SIGNIFICANT CHANGE UP
SPECIMEN SOURCE: SIGNIFICANT CHANGE UP
SPECIMEN SOURCE: SIGNIFICANT CHANGE UP

## 2019-05-16 PROCEDURE — 31500 INSERT EMERGENCY AIRWAY: CPT | Mod: GC

## 2019-05-16 PROCEDURE — 76705 ECHO EXAM OF ABDOMEN: CPT | Mod: 26

## 2019-05-16 RX ORDER — HALOPERIDOL DECANOATE 100 MG/ML
2.5 INJECTION INTRAMUSCULAR ONCE
Refills: 0 | Status: COMPLETED | OUTPATIENT
Start: 2019-05-16 | End: 2019-05-16

## 2019-05-16 RX ORDER — SODIUM CHLORIDE 9 MG/ML
1000 INJECTION, SOLUTION INTRAVENOUS
Refills: 0 | Status: DISCONTINUED | OUTPATIENT
Start: 2019-05-16 | End: 2019-05-16

## 2019-05-16 RX ADMIN — HALOPERIDOL DECANOATE 2.5 MILLIGRAM(S): 100 INJECTION INTRAMUSCULAR at 00:33

## 2019-05-16 NOTE — CHART NOTE - NSCHARTNOTEFT_GEN_A_CORE
Pt is a 64M with HTN, Type 2 DM on insulin, HTN, anxiety, Atrial flutter (on apixaban), severe aortic stenosis, third degree av block s/p PPM, recent admission for acute anemia due to GI bleed from gastric ulcer just admitted tonight for sepsis and acute hepatic failure of unclear etiology with high suspicion for Budd Chiari Syndrome. Pt with hepatic encephalopathy and was becoming increasingly confused, then suddenly developed pallor and cyanosis of lips. Aside from fever, VS had been stable. ED staff was about to perform EKG when pt suddenly lost consciousness, found to have no pulse, and code blue called. Pt is a 64M with HTN, Type 2 DM on insulin, HTN, anxiety, Atrial flutter (on apixaban), severe aortic stenosis, third degree av block s/p PPM, recent admission for acute anemia due to GI bleed from gastric ulcer admitted tonight for sepsis and acute hepatic failure of unclear etiology with high suspicion for Budd Chiari Syndrome, transferred to MICU service and awaiting transfer to transfer to liver transplant center. Pt with hepatic encephalopathy and was becoming increasingly confused, then suddenly developed pallor and cyanosis of lips. Aside from fever, VS had been stable. ED staff was about to perform EKG when pt suddenly lost consciousness, found to have no pulse, and code blue called. CPR immediately initiated as per ACLS protocol, MICU called who came and intubated patient. Initial rhythm was PEA, then Vfib x2 and Vtach x1 with 3 shocks total delivered, subsequently all rhythm checks PEA. Pt received a total of 9 epinephrine as well as amiodarone 300 mg and 150 mg as per ACLS. Fingerstick was low so given 2 amps dextrose. Pt also given 2 amps bicarb for lactic acidosis as well as calcium chloride, and magnesium. Pt is a 64M with HTN, Type 2 DM on insulin, HTN, anxiety, Atrial flutter (on apixaban), severe aortic stenosis, third degree av block s/p PPM, recent admission for acute anemia due to GI bleed from gastric ulcer admitted tonight for sepsis and acute hepatic failure of unclear etiology with high suspicion for Budd Chiari Syndrome, transferred to MICU service and awaiting transfer to transfer to liver transplant center. Pt with hepatic encephalopathy and was becoming increasingly confused, then suddenly developed pallor and cyanosis of lips. Aside from fever, VS had been stable. ED staff was about to perform EKG when pt suddenly lost consciousness, found to have no pulse, and code blue called. CPR immediately initiated as per ACLS protocol. MICU team called, came immediately and intubated patient. Initial rhythm was PEA, then Vfib x2 and Vtach x1 with 3 shocks delivered in total, subsequently all rhythm checks PEA. Pt noted to be v-paced on EKG. Pt received a total of 9 epinephrine as well as amiodarone 300 mg and 150 mg as per ACLS. Fingerstick was low so given 2 amps dextrose. Pt also given 2 amps bicarb for lactic acidosis as well as calcium chloride, and magnesium. Following more than 30 min CPR, pt pulseless with without spontaneous respirations, pupils fixed and dilated. No cardiac activity of bedside transthoracic echocardiogram. Pt pronounced dead at 1:36 AM. Pt is a 64M with HTN, Type 2 DM on insulin, HTN, anxiety, Atrial flutter on apixaban, severe aortic stenosis, third degree av block s/p PPM, recent admission for acute anemia due to GI bleed from gastric ulcer admitted tonight for sepsis and acute hepatic failure of unclear etiology with high suspicion for Budd Chiari Syndrome, transferred to MICU service and awaiting transfer to liver transplant center. Pt with hepatic encephalopathy and was becoming increasingly confused, then suddenly developed pallor and cyanosis of lips. Aside from fever, VS had been stable. ED staff was about to perform EKG when pt suddenly lost consciousness, found to have no pulse, and code blue called. CPR immediately initiated as per ACLS protocol. MICU team called, came immediately and intubated patient. Initial rhythm was PEA, then Vfib x2 and Vtach x1 with 3 shocks delivered in total, subsequently all rhythm checks PEA. Pt noted to be v-paced on EKG. Pt received a total of 9 epinephrine as well as amiodarone 300 mg and 150 mg as per ACLS. Fingerstick was low so given 2 amps dextrose. Pt also given 2 amps bicarb for lactic acidosis as well as calcium chloride, magnesium, and pantoprazole 80 mg. Following more than 30 min CPR, pt pulseless with without spontaneous respirations, pupils fixed and dilated. No cardiac activity of bedside transthoracic echocardiogram. Pt pronounced dead at 1:36 AM. Pt's wife present at bedside, does not wish for autopsy.

## 2019-05-16 NOTE — CHART NOTE - NSCHARTNOTEFT_GEN_A_CORE
Case discussed with [facility] who accepted the patient as an ICU to ICU transfer. Patient accepted by MICU. Will transfer patient from medicine to MICU and then to [facility]. Wife, Liset, updated on status.     Sumi Manjarrez MD  PGY2 | Internal Medicine  [phone] / [phone] Case discussed with [facility] who accepted the patient as an ICU to ICU transfer. Patient accepted by MICU. Will transfer patient from medicine to MICU and then to [facility]. Wife, Liset, updated on status.     Sumi Manjarrez MD  PGY2 | Internal Medicine  [phone] / [phone]    Agree with the above,  ORAL Blood, Med attg

## 2019-05-16 NOTE — ED ADULT NURSE REASSESSMENT NOTE - NS ED NURSE REASSESS COMMENT FT1
Break shift Rn: Pt unable to undergo ultrasound due to movement, MD Quevedo made aware, pt medicated as per ordered, brought back to room 23. Pt pulled out IV in left arm, this author placed 20G in left wrist. will continue to monitor.

## 2019-05-16 NOTE — DISCHARGE NOTE FOR THE EXPIRED PATIENT - HOSPITAL COURSE
63yo Male with GERD, Type 2 DM on insulin, HTN, anxiety, Atrial flutter (on apixaban), severe aortic stenosis, third degree av block s/p PPM, recent admission for acute anemia due to GI bleed (cecal ulcer and clean based gastric ulcer) requiring PRBC transfusion and holding anticoagulation; On this re-hospitalization found to have acute liver failure a/w hepatic encephalopathy of unknown etiology however differential included BCS. In addition, the patient was found to be septic. The patient was started on a heparin drip and received broad spectrum antibiotics. The patient suffered a cardiac arrest and ACLS was performed for 30 minutes. Resuscitative measures were unsuccessful and the patient . 63yo Male with GERD, Type 2 DM on insulin, HTN, anxiety, Atrial flutter (on apixaban), severe aortic stenosis, third degree av block s/p PPM, recent admission for acute anemia due to GI bleed (cecal ulcer and clean based gastric ulcer) requiring PRBC transfusion and holding anticoagulation; On this re-hospitalization found to have acute liver failure a/w hepatic encephalopathy of unknown etiology however differential included BCS. In addition, the patient was found to be septic. The patient was started on a heparin drip and received broad spectrum antibiotics. The patient suffered a cardiac arrest and ACLS was performed for 30 minutes. Resuscitative measures were unsuccessful and the patient . Discussed with ME office - talked with Karin Aguila and upon pre-screening questions patient does not meet criteria for reporting as an ME case.

## 2019-05-16 NOTE — CONSULT NOTE ADULT - ASSESSMENT
Impression:  1) Encephalopathy with uptrending liver enzymes- Differential diagnosis includes infectious etiology (recent hospital stay, currently febrile with leukocytosis), acute viral hepatitis, PVT, autoimmune hepatitis. Pt has no alcohol or tylenol use at home. Other differential includes malignant hyperthermia in the setting of recently getting anesthesia (however symptoms usually occur right after induction and not a few days later), budd chiari syndrome,     2) A flutter- Off Eliquis  3) Severe AS  4) Third degree AV block s/p PPM    Recommendations:  -Recommend higher level of care (ICU)  -Contacted GI fellow at  [facility] given worsening encephalopathy, uprising INR and uptrending liver enzymes; Pt is currently accepted for ICU to ICU transfer  -Would insert NGT for lactulose administration, titrate to 4-6 BMs in the initial period  -Check US doppler to evaluate for portal vein thrombosis/hepatic vein thrombosis  -Agree with heparin gtt for now  -Would monitor CBC, INR, CMP q4 hours, would monitor mental status very closely  -Obtain/followup HBsAb, HBsAg, HBcAb, HCV Ab, HAV IgG/IgM, HEV Ab  -Obtain/followup CMV, EBV, HSV PCR  -Would continue broad spectrum antibiotics  -Follow up blood cultures x2  -Consider lumbar puncture for encephalopathy  -Obtain/followup DARRIUS, ASMA, LKM Ab, quant IgM, IgA, IgG, AMA  -Avoid all hepatotoxic agents Impression:  1) Encephalopathy with uptrending liver enzymes- Differential diagnosis includes infectious etiology (recent hospital stay, currently febrile with leukocytosis), acute viral hepatitis, PVT, autoimmune hepatitis. Pt has no alcohol or tylenol use at home. Other differential includes malignant hyperthermia in the setting of recently getting anesthesia (however symptoms usually occur right after induction and not a few days later), budd chiari syndrome. Unlikely ischemic hepatopathy as patient had no hypotension, unlikely DILI as there was new medication introduced.    2) A flutter- Off Eliquis  3) Severe AS  4) Third degree AV block s/p PPM    Recommendations:  -Recommend higher level of care (ICU)  -Contacted GI fellow at  [facility] given worsening encephalopathy, uprising INR and uptrending liver enzymes; Pt is currently accepted for ICU to ICU transfer  -Would insert NGT for lactulose administration, titrate to 4-6 BMs in the initial period  -Check US doppler to evaluate for portal vein thrombosis/hepatic vein thrombosis  -Agree with heparin gtt for now  -Would monitor CBC, INR, CMP q4 hours, would monitor mental status very closely  -Obtain/followup HBsAb, HBsAg, HBcAb, HCV Ab, HAV IgG/IgM, HEV Ab  -Obtain/followup CMV, EBV, HSV PCR  -Would continue broad spectrum antibiotics  -Follow up blood cultures x2  -Consider lumbar puncture for encephalopathy  -Obtain/followup DARRIUS, ASMA, LKM Ab, quant IgM, IgA, IgG, AMA  -Avoid all hepatotoxic agents Impression:  1) Encephalopathy with uptrending liver enzymes- Differential diagnosis includes infectious etiology (recent hospital stay, currently febrile with leukocytosis), acute viral hepatitis, PVT, autoimmune hepatitis. Pt has no alcohol or tylenol use at home. Other differential includes malignant hyperthermia in the setting of recently getting anesthesia (however symptoms usually occur right after induction and not a few days later), budd chiari syndrome. Unlikely ischemic hepatopathy as patient had no hypotension, unlikely DILI as there was new medication introduced.    2) A flutter- Off Eliquis  3) Severe AS  4) Third degree AV block s/p PPM    Recommendations:  -Recommend higher level of care (ICU)  -Contacted GI fellow at  [facility] given worsening encephalopathy, uprising INR and uptrending liver enzymes; Pt is currently accepted for ICU to ICU transfer  -Would insert NGT for lactulose administration, titrate to 4-6 BMs in the initial period  -Check US doppler to evaluate for portal vein thrombosis/hepatic vein thrombosis  -Agree with heparin gtt for now  -Would monitor CBC, INR, CMP q4 hours, would monitor mental status very closely  -Obtain/followup HBsAb, HBsAg, HBcAb, HCV Ab, HAV IgG/IgM, HEV Ab  -Obtain/followup CMV, EBV, HSV PCR  -Would continue broad spectrum antibiotics  -Follow up blood cultures x2  -Consider lumbar puncture for encephalopathy  -Obtain/followup DARRIUS, ASMA, LKM Ab, quant IgM, IgA, IgG, AMA  -Avoid all hepatotoxic agents  -Please call with questions Impression:  1) Encephalopathy with uptrending liver enzymes- Differential diagnosis includes infectious etiology (recent hospital stay, currently febrile with leukocytosis), acute viral hepatitis, PVT, autoimmune hepatitis. Pt has no alcohol or tylenol use at home. Other differential includes malignant hyperthermia in the setting of recently getting anesthesia (however symptoms usually occur right after induction and not a few days later), neuroleptic malignant syndrome in the setting of dehyration (use of paxil), budd chiari syndrome. Unlikely ischemic hepatopathy as patient had no hypotension, unlikely DILI as there was new medication introduced.    2) A flutter- Off Eliquis  3) Severe AS  4) Third degree AV block s/p PPM    Recommendations:  -Recommend higher level of care (ICU)  -Contacted GI fellow at  [facility] given worsening encephalopathy, uprising INR and uptrending liver enzymes; Pt is currently accepted for ICU to ICU transfer  -Would insert NGT for lactulose administration, titrate to 4-6 BMs in the initial period  -Check US doppler to evaluate for portal vein thrombosis/hepatic vein thrombosis  -Would monitor CBC, INR, CMP q4 hours, would monitor mental status very closely  -Obtain/followup HBsAb, HBsAg, HBcAb, HCV Ab, HAV IgG/IgM, HEV Ab  -Obtain/followup CMV, EBV, HSV PCR  -Check CK level  -Would continue broad spectrum antibiotics  -Follow up blood cultures x2  -Consider lumbar puncture for encephalopathy  -Obtain/followup DARRIUS, ASMA, LKM Ab, quant IgM, IgA, IgG, AMA  -Avoid all hepatotoxic agents  -Please call with questions

## 2019-05-16 NOTE — ED ADULT NURSE REASSESSMENT NOTE - NS ED NURSE REASSESS COMMENT FT1
Break Shift RN: MICU resident called to make aware that pt trying to get out of bed. While on phone with MICU resident, OK Peña and JEANMARIE Diaz in room, code blue called and MICU resident made aware to come down to ED.

## 2019-05-16 NOTE — CONSULT NOTE ADULT - SUBJECTIVE AND OBJECTIVE BOX
Chief Complaint:  Patient is a 64y old  Male who presents with a chief complaint of "Shaking" (15 May 2019 20:08)      HPI:    65yo M GERD, DM2 on insulin, HTN, anxiety, atrial flutter (on Eliquis stopped 3 weeks ago 2/2 melena), severe AS, third degree AV block s/p PPM presents with 1 day of jerky movements. When patient was examined at 1200am, patient appeared acutely confused with wailing of arms and legs. Pt was unable to answer any questions when asked. Per chart review, pt started uncontrollably shaking yesterday morning and was not his usual self per wife. When patient presented to the ED, patient was found to be febrile (tmax 102), with new leukocytosis and elevated liver enzymes. Pt was previously admitted at [facility] - for melena and acute blood loss anemia, at that time received anesthesia for EGD/colonoscopy. EGD showed gastric ulcer (clean based), colonoscopy revealing cecal ulcer with irregular and firm edges, biopsies showing necroinflammatory debris consistent with an ulcer. Patient was discharged on  and was in his normal mental state health. There is no reported history of liver scarring, cirrhosis, abdominal swelling, confusion in the past. During the last admission, there was no new medications except for Lasix. There was no hypotension recorded or use of Tylenol. There is no reported travel history, needle stick injury, alcohol use, OTC use.     In the ED, pt noted to have uptrending liver enzymes and INR, most recent AST/ALT in the 5000s/3000s range, INR 2.50, Tbili 2.4.     Allergies:  No Known Drug Allergies  pollen (Rhinitis; Rhinorrhea; Eye Irritation)      Home Medications:    Hospital Medications:  dextrose 40% Gel 15 Gram(s) Oral once PRN  dextrose 5% + sodium chloride 0.45%. 1000 milliLiter(s) IV Continuous <Continuous>  dextrose 5%. 1000 milliLiter(s) IV Continuous <Continuous>  dextrose 50% Injectable 12.5 Gram(s) IV Push once  dextrose 50% Injectable 25 Gram(s) IV Push once  dextrose 50% Injectable 25 Gram(s) IV Push once  glucagon  Injectable 1 milliGRAM(s) IntraMuscular once PRN  heparin  Infusion.  Unit(s)/Hr IV Continuous <Continuous>  heparin  Injectable 8000 Unit(s) IV Push every 6 hours PRN  heparin  Injectable 4000 Unit(s) IV Push every 6 hours PRN  insulin lispro (HumaLOG) corrective regimen sliding scale   SubCutaneous every 6 hours  pantoprazole  Injectable 40 milliGRAM(s) IV Push daily  piperacillin/tazobactam IVPB. 3.375 Gram(s) IV Intermittent every 8 hours      PMHX/PSHX:  Arrhythmia as indication for cardiac pacemaker replacement  HLD (hyperlipidemia)  Depression  Diabetes mellitus type 2 in obese  HTN (hypertension)  Diabetes mellitus type II, controlled, with no complications  Diabetes mellitus type II  Dyslipidemia  HTN (hypertension)  No significant past surgical history      Family history:  No pertinent family history in first degree relatives      There is no family history of peptic ulcer disease, gastric cancer, colon polyps, colon cancer, celiac disease, biliary, hepatic, or pancreatic disease.  None of the female relatives have breast, uterine, or ovarian cancer.     Social History:     ROS:     Unable to obtain    PHYSICAL EXAM:     EXAM limited as patient is confused and wailing his arms and legs around    GENERAL:  Confused  HEENT:  +scleral icterus  CHEST:  Full & symmetric excursion  HEART:  Regular rhythm, S1, S2, no murmur/rub/S3/S4, no abdominal bruit, no edema  ABDOMEN:  Soft, non-tender, non-distended, normoactive bowel sounds  EXTEREMITIES:  no cyanosis,clubbing or edema  SKIN:  Jaundice   NEURO:  Unable to examine, rigid on exam, not answering questions     Vital Signs:  Vital Signs Last 24 Hrs  T(C): 38.6 (15 May 2019 23:06), Max: 38.9 (15 May 2019 19:45)  T(F): 101.5 (15 May 2019 23:06), Max: 102 (15 May 2019 19:45)  HR: 66 (15 May 2019 23:06) (59 - 66)  BP: 141/72 (15 May 2019 23:06) (108/94 - 154/100)  BP(mean): --  RR: 21 (15 May 2019 23:06) (16 - 21)  SpO2: 95% (15 May 2019 23:06) (94% - 97%)  Daily     Daily     LABS:                        8.3    28.13 )-----------( 305      ( 15 May 2019 22:40 )             29.1     Mean Cell Volume: 82.4 fL (05-15-19 @ 22:40)    05-15    141  |  103  |  30<H>  ----------------------------<  118<H>  4.6   |  19<L>  |  1.64<H>    Ca    9.0      15 May 2019 22:40  Phos  4.1     05-15  Mg     1.9     05-15    TPro  6.5  /  Alb  3.6  /  TBili  2.4<H>  /  DBili  x   /  AST  5811<H>  /  ALT  3567<H>  /  AlkPhos  168<H>  -15    LIVER FUNCTIONS - ( 15 May 2019 22:40 )  Alb: 3.6 g/dL / Pro: 6.5 g/dL / ALK PHOS: 168 u/L / ALT: 3567 u/L / AST: 5811 u/L / GGT: x           PT/INR - ( 15 May 2019 22:40 )   PT: 29.3 SEC;   INR: 2.50          PTT - ( 15 May 2019 22:40 )  PTT:31.2 SEC  Urinalysis Basic - ( 15 May 2019 15:58 )    Color: YELLOW / Appearance: CLEAR / S.026 / pH: 5.5  Gluc: 150 / Ketone: TRACE  / Bili: NEGATIVE / Urobili: NORMAL   Blood: SMALL / Protein: 70 / Nitrite: NEGATIVE   Leuk Esterase: NEGATIVE / RBC: 3-5 / WBC 0-2   Sq Epi: OCC / Non Sq Epi: x / Bacteria: NEGATIVE      Amylase Serum--      Lipase serum--       [id number]  Amylase Serum--      Lipase [id number]       Ammonia--                          8.3    28.13 )-----------( 305      ( 15 May 2019 22:40 )             29.1                         8.0    19.39 )-----------( 272      ( 15 May 2019 11:15 )             27.5                         8.4    13.03 )-----------( 249      ( 14 May 2019 08:51 )             28.9                         8.1    10.65 )-----------( 250      ( 13 May 2019 09:10 )             27.3     Imaging:

## 2019-05-17 LAB
BACTERIA UR CULT: SIGNIFICANT CHANGE UP
SPECIMEN SOURCE: SIGNIFICANT CHANGE UP

## 2019-05-20 LAB
BACTERIA BLD CULT: SIGNIFICANT CHANGE UP
BACTERIA BLD CULT: SIGNIFICANT CHANGE UP

## 2019-06-04 ENCOUNTER — APPOINTMENT (OUTPATIENT)
Dept: ELECTROPHYSIOLOGY | Facility: CLINIC | Age: 64
End: 2019-06-04

## 2019-10-04 ENCOUNTER — INBOUND DOCUMENT (OUTPATIENT)
Age: 64
End: 2019-10-04

## 2019-10-09 ENCOUNTER — INBOUND DOCUMENT (OUTPATIENT)
Age: 64
End: 2019-10-09

## 2020-04-29 NOTE — PATIENT PROFILE ADULT - LAST BOWEL MOVEMENT DATE
08-May-2019 Eye Protection Verbiage: Before proceeding with the stage, a plastic scleral shield was inserted. The globe was anesthetized with a few drops of 1% lidocaine with 1:100,000 epinephrine. Then, an appropriate sized scleral shield was chosen and coated with lacrilube ointment. The shield was gently inserted and left in place for the duration of each stage. After the stage was completed, the shield was gently removed.

## 2022-03-09 NOTE — PRE-OP CHECKLIST - IDENTIFICATION BAND VERIFIED
done Non-slip footwear when patient is out of bed/[place] to call system/Physically safe environment - no spills, clutter or unnecessary equipment/Purposeful Proactive Rounding/Room/bathroom lighting operational, light cord in reach

## 2022-10-25 NOTE — ED ADULT NURSE NOTE - DOES PATIENT HAVE ADVANCE DIRECTIVE
Problem: INFECTION - ADULT  Goal: Absence or prevention of progression during hospitalization  Description: INTERVENTIONS:  - Assess and monitor for signs and symptoms of infection  - Monitor lab/diagnostic results  - Monitor all insertion sites, i e  indwelling lines, tubes, and drains  - Monitor endotracheal if appropriate and nasal secretions for changes in amount and color  - Gordon appropriate cooling/warming therapies per order  - Administer medications as ordered  - Instruct and encourage patient and family to use good hand hygiene technique  - Identify and instruct in appropriate isolation precautions for identified infection/condition  Outcome: Progressing     Problem: Knowledge Deficit  Goal: Patient/family/caregiver demonstrates understanding of disease process, treatment plan, medications, and discharge instructions  Description: Complete learning assessment and assess knowledge base    Interventions:  - Provide teaching at level of understanding  - Provide teaching via preferred learning methods  Outcome: Progressing No

## 2023-07-20 NOTE — ED ADULT TRIAGE NOTE - SOURCE OF INFORMATION
Patient Infliximab Counseling:  I discussed with the patient the risks of infliximab including but not limited to myelosuppression, immunosuppression, autoimmune hepatitis, demyelinating diseases, lymphoma, and serious infections.  The patient understands that monitoring is required including a PPD at baseline and must alert us or the primary physician if symptoms of infection or other concerning signs are noted.